# Patient Record
Sex: MALE | Race: WHITE | NOT HISPANIC OR LATINO | Employment: OTHER
[De-identification: names, ages, dates, MRNs, and addresses within clinical notes are randomized per-mention and may not be internally consistent; named-entity substitution may affect disease eponyms.]

---

## 2017-01-05 DIAGNOSIS — E78.2 MIXED HYPERLIPIDEMIA: Primary | ICD-10-CM

## 2017-01-05 RX ORDER — SIMVASTATIN 10 MG
TABLET ORAL
Qty: 30 TABLET | Refills: 11 | Status: SHIPPED | OUTPATIENT
Start: 2017-01-05 | End: 2018-02-27

## 2017-01-05 NOTE — TELEPHONE ENCOUNTER
Zocor     Last Written Prescription Date: 10/11/16  Last Fill Quantity: 30, # refills: 2  Last Office Visit with [facility], [facility] or [facility] prescribing provider: 12/22/16       CHOL      113   12/22/2016  HDL       61   12/22/2016  LDL       36   12/22/2016  TRIG       79   12/22/2016  CHOLHDLRATIO      1.7   11/12/2015

## 2017-01-26 DIAGNOSIS — E11.9 UNCOMPLICATED TYPE 2 DIABETES MELLITUS (H): Primary | ICD-10-CM

## 2017-02-09 DIAGNOSIS — L50.9 HIVES: Primary | ICD-10-CM

## 2017-02-10 RX ORDER — CETIRIZINE HYDROCHLORIDE 10 MG/1
TABLET, FILM COATED ORAL
Qty: 30 TABLET | Refills: 9 | Status: SHIPPED | OUTPATIENT
Start: 2017-02-10 | End: 2017-12-05

## 2017-02-23 DIAGNOSIS — E11.9 DIABETES MELLITUS, TYPE 2 (H): ICD-10-CM

## 2017-02-23 RX ORDER — LANCETS 32 GAUGE
EACH MISCELLANEOUS
Qty: 100 EACH | Refills: 2 | Status: SHIPPED | OUTPATIENT
Start: 2017-02-23 | End: 2019-03-04

## 2017-03-09 DIAGNOSIS — L23.7 CONTACT DERMATITIS DUE TO POISON IVY: ICD-10-CM

## 2017-03-10 RX ORDER — TRIAMCINOLONE ACETONIDE 1 MG/G
CREAM TOPICAL
Qty: 80 G | Refills: 1 | Status: SHIPPED | OUTPATIENT
Start: 2017-03-10 | End: 2017-04-20

## 2017-04-20 DIAGNOSIS — L23.7 CONTACT DERMATITIS DUE TO POISON IVY: ICD-10-CM

## 2017-04-20 NOTE — TELEPHONE ENCOUNTER
jaylin      Last Written Prescription Date: 3/10/17  Last Fill Quantity: 80g,  # refills: 1   Last Office Visit with G, P or [facility] prescribing provider: 12/22/16

## 2017-04-21 RX ORDER — TRIAMCINOLONE ACETONIDE 1 MG/G
CREAM TOPICAL
Qty: 80 G | Refills: 1 | Status: SHIPPED | OUTPATIENT
Start: 2017-04-21 | End: 2017-06-08

## 2017-06-08 DIAGNOSIS — L23.7 CONTACT DERMATITIS DUE TO POISON IVY: ICD-10-CM

## 2017-06-09 NOTE — TELEPHONE ENCOUNTER
Triamcinolone 0.1 cream      Last Written Prescription Date: 6/08/17  Last Fill Quantity: 80gram,  # refills: 1   Last Office Visit with G, [facility] or [facility] prescribing provider: 12/22/16

## 2017-06-12 RX ORDER — TRIAMCINOLONE ACETONIDE 1 MG/G
CREAM TOPICAL
Qty: 80 G | Refills: 1 | Status: SHIPPED | OUTPATIENT
Start: 2017-06-12 | End: 2017-07-27

## 2017-06-15 DIAGNOSIS — I10 HTN (HYPERTENSION): ICD-10-CM

## 2017-06-19 RX ORDER — RAMIPRIL 5 MG/1
CAPSULE ORAL
Qty: 30 CAPSULE | Refills: 5 | Status: SHIPPED | OUTPATIENT
Start: 2017-06-19 | End: 2017-12-07

## 2017-06-29 DIAGNOSIS — E11.9 UNCOMPLICATED TYPE 2 DIABETES MELLITUS (H): ICD-10-CM

## 2017-06-30 NOTE — TELEPHONE ENCOUNTER
Metformin         Last Written Prescription Date: 1/30/17  Last Fill Quantity: 120, # refills: 4  Last Office Visit with [facility], [facility] or [facility] prescribing provider:  12/22/16        BP Readings from Last 3 Encounters:   12/22/16 94/64   09/13/16 110/64   03/10/16 112/64     Lab Results   Component Value Date    MICROL 125 12/22/2016     Lab Results   Component Value Date    UMALCR 143.18 12/22/2016     Creatinine   Date Value Ref Range Status   12/22/2016 0.82 0.66 - 1.25 mg/dL Final   ]  GFR Estimate   Date Value Ref Range Status   12/22/2016 >90  Non  GFR Calc   >60 mL/min/1.7m2 Final   09/13/2016 >90  Non  GFR Calc   >60 mL/min/1.7m2 Final   11/12/2015 >90  Non  GFR Calc   >60 mL/min/1.7m2 Final     GFR Estimate If Black   Date Value Ref Range Status   12/22/2016 >90   GFR Calc   >60 mL/min/1.7m2 Final   09/13/2016 >90   GFR Calc   >60 mL/min/1.7m2 Final   11/12/2015 >90   GFR Calc   >60 mL/min/1.7m2 Final     Lab Results   Component Value Date    CHOL 113 12/22/2016     Lab Results   Component Value Date    HDL 61 12/22/2016     Lab Results   Component Value Date    LDL 36 12/22/2016     Lab Results   Component Value Date    TRIG 79 12/22/2016     Lab Results   Component Value Date    CHOLHDLRATIO 1.7 11/12/2015     Lab Results   Component Value Date    AST 17 09/24/2013     Lab Results   Component Value Date    ALT 33 12/22/2016     Lab Results   Component Value Date    A1C 6.6 12/22/2016    A1C 6.2 09/13/2016    A1C 6.1 11/12/2015    A1C 6.2 03/26/2015    A1C 6.3 09/26/2014     Potassium   Date Value Ref Range Status   12/22/2016 4.2 3.4 - 5.3 mmol/L Final

## 2017-07-06 DIAGNOSIS — E11.9 DIABETES MELLITUS, TYPE 2 (H): ICD-10-CM

## 2017-07-07 RX ORDER — ALPRAZOLAM 1 MG/1
TABLET ORAL
Qty: 50 STRIP | Refills: 7 | Status: SHIPPED | OUTPATIENT
Start: 2017-07-07 | End: 2018-01-25

## 2017-07-27 DIAGNOSIS — L23.7 CONTACT DERMATITIS DUE TO POISON IVY: ICD-10-CM

## 2017-07-28 NOTE — TELEPHONE ENCOUNTER
Kenalog cream      Last Written Prescription Date: 6/12/17  Last Fill Quantity: 80 g,  # refills: 1   Last Office Visit with [facility], [facility] or [facility] prescribing provider: 12/22/16

## 2017-07-31 RX ORDER — TRIAMCINOLONE ACETONIDE 1 MG/G
CREAM TOPICAL
Qty: 80 G | Refills: 1 | Status: SHIPPED | OUTPATIENT
Start: 2017-07-31 | End: 2017-09-14

## 2017-08-24 DIAGNOSIS — E11.9 TYPE 2 DIABETES MELLITUS WITHOUT COMPLICATION, WITHOUT LONG-TERM CURRENT USE OF INSULIN (H): Primary | ICD-10-CM

## 2017-08-24 DIAGNOSIS — E11.9 UNCOMPLICATED TYPE 2 DIABETES MELLITUS (H): ICD-10-CM

## 2017-08-24 NOTE — TELEPHONE ENCOUNTER
Metformin  Last visit: 12.22.16 - Overdue for diabetic follow up. No visit scheduled. Please advise. Thank you

## 2017-09-14 DIAGNOSIS — L23.7 CONTACT DERMATITIS DUE TO POISON IVY: ICD-10-CM

## 2017-09-14 RX ORDER — TRIAMCINOLONE ACETONIDE 1 MG/G
CREAM TOPICAL
Qty: 80 G | Refills: 1 | Status: SHIPPED | OUTPATIENT
Start: 2017-09-14 | End: 2017-10-30

## 2017-10-30 DIAGNOSIS — L23.7 CONTACT DERMATITIS DUE TO POISON IVY: ICD-10-CM

## 2017-10-31 NOTE — TELEPHONE ENCOUNTER
Kenalog      Last Written Prescription Date: 9/14/17  Last Fill Quantity: 80g,  # refills: 1   Last Office Visit with G, P or [facility] prescribing provider: 12/22/16

## 2017-11-02 RX ORDER — TRIAMCINOLONE ACETONIDE 1 MG/G
CREAM TOPICAL
Qty: 80 G | Refills: 1 | Status: SHIPPED | OUTPATIENT
Start: 2017-11-02 | End: 2017-12-11

## 2017-11-20 DIAGNOSIS — E11.9 TYPE 2 DIABETES MELLITUS WITHOUT COMPLICATION, WITHOUT LONG-TERM CURRENT USE OF INSULIN (H): ICD-10-CM

## 2017-11-24 DIAGNOSIS — Z00.00 HEALTHCARE MAINTENANCE: ICD-10-CM

## 2017-11-24 NOTE — TELEPHONE ENCOUNTER
Peridex      Last Written Prescription Date: 10/31/16  Last Fill Quantity: 473ml,  # refills: PRN   Last Office Visit with [facility], [facility] or [facility] prescribing provider: 12/22/16                                         Next 5 appointments (look out 90 days)     Dec 26, 2017  9:00 AM CST   (Arrive by 8:45 AM)   PHYSICAL with Nicole Rojas MD   [facility] ([facility] )    [address]  [place] [address]   [phone]

## 2017-11-28 RX ORDER — CHLORHEXIDINE GLUCONATE ORAL RINSE 1.2 MG/ML
SOLUTION DENTAL
Qty: 473 ML | Status: SHIPPED | OUTPATIENT
Start: 2017-11-28 | End: 2018-01-25

## 2017-12-02 ENCOUNTER — HEALTH MAINTENANCE LETTER (OUTPATIENT)
Age: 62
End: 2017-12-02

## 2017-12-05 DIAGNOSIS — L50.9 HIVES: ICD-10-CM

## 2017-12-06 NOTE — TELEPHONE ENCOUNTER
Zantac       Last Written Prescription Date: 2/10/2017  Last Fill Quantity: 30,  # refills: 9   Last Office Visit with [facility], [facility] or [facility] prescribing provider: 12/22/2016                     Allergy       Last Written Prescription Date: 2/10/2017  Last Fill Quantity: 30,  # refills: 9   Last Office Visit with [facility], [facility] or [facility] prescribing provider: 12/22/2016             Next 5 appointments (look out 90 days)     Dec 26, 2017  9:00 AM CST   (Arrive by 8:45 AM)   PHYSICAL with Nicole Rojas MD   [facility] [place] ([facility] )    [address]  [facility] [address]   [phone]

## 2017-12-07 DIAGNOSIS — I10 HTN (HYPERTENSION): ICD-10-CM

## 2017-12-07 RX ORDER — CETIRIZINE HYDROCHLORIDE 10 MG/1
TABLET, FILM COATED ORAL
Qty: 30 TABLET | Refills: 0 | Status: SHIPPED | OUTPATIENT
Start: 2017-12-07 | End: 2018-01-25

## 2017-12-07 NOTE — TELEPHONE ENCOUNTER
Ramipril      Last Written Prescription Date: 6/19/17  Last Fill Quantity: 30,  # refills: 5   Last Office Visit with [facility], [facility] or [facility] prescribing provider: 12/22/16                                         Next 5 appointments (look out 90 days)     Dec 26, 2017  9:00 AM CST   (Arrive by 8:45 AM)   PHYSICAL with Nicole Rojas MD   [facility] ([facility] )    [address]  [place] [address]   [phone]

## 2017-12-08 RX ORDER — RAMIPRIL 5 MG/1
CAPSULE ORAL
Qty: 30 CAPSULE | Refills: 0 | Status: SHIPPED | OUTPATIENT
Start: 2017-12-08 | End: 2018-01-25

## 2017-12-11 DIAGNOSIS — L23.7 CONTACT DERMATITIS DUE TO POISON IVY: ICD-10-CM

## 2017-12-13 NOTE — TELEPHONE ENCOUNTER
Kenalog Cream      Last Written Prescription Date: 11/02/2017  Last Fill Quantity: 80g,  # refills: 1   Last Office Visit with [facility], [facility] or [facility] prescribing provider: 12/22/2016                                         Next 5 appointments (look out 90 days)     Dec 26, 2017  9:00 AM CST   (Arrive by 8:45 AM)   PHYSICAL with Nicole Rojas MD   [facility] ([facility] )    [address]  [facility] [address]   [phone]

## 2017-12-14 RX ORDER — TRIAMCINOLONE ACETONIDE 1 MG/G
CREAM TOPICAL
Qty: 80 G | Refills: 0 | Status: SHIPPED | OUTPATIENT
Start: 2017-12-14 | End: 2018-01-03

## 2017-12-26 DIAGNOSIS — Z00.00 HEALTH CARE MAINTENANCE: ICD-10-CM

## 2017-12-26 NOTE — LETTER
December 27, 2017      Aki DUVALL Sopp  [address] [facility]  [place] [address]        Dear Aki,     APPOINTMENT REMINDER:   Our records indicates that it is time for you to be seen for a recheck.     Your current medication request will be approved for one refill but you will need to be seen before any additional refills can be approved.  Taking care of your health is important to us, and ongoing visits with your provider are vital to your care.    We look forward to seeing you in the near future.  You may call our office at [phone] to schedule a visit.     Please disregard this notice if you have already made an appointment.        Sincerely,        Nicole Rojas MD

## 2017-12-27 RX ORDER — MULTIVITAMIN WITH FOLIC ACID 400 MCG
TABLET ORAL
Qty: 30 TABLET | Refills: 0 | Status: SHIPPED | OUTPATIENT
Start: 2017-12-27 | End: 2018-05-24

## 2017-12-27 NOTE — TELEPHONE ENCOUNTER
Multiple Vitamin ( Tab-a-deepti)      Last Written Prescription Date: 12/28/16  Last Fill Quantity: 30,  # refills: 11   Last Office Visit with [facility], [facility] or [facility] prescribing provider: 12/26/17

## 2018-01-03 DIAGNOSIS — L23.7 CONTACT DERMATITIS DUE TO POISON IVY: ICD-10-CM

## 2018-01-03 NOTE — TELEPHONE ENCOUNTER
triamcinolone (KENALOG) 0.1 % cream   Last Written Prescription Date: 12/14/17  Last Fill Quantity: 80g,  # refills: 0   Last Office Visit with [facility], [facility] or [facility] prescribing provider: 12/26/17                                         Next 5 appointments (look out 90 days)     Feb 08, 2018  1:30 PM CST   (Arrive by 1:15 PM)   PHYSICAL with Nicole Rojas MD   [facility] ([facility] )    [address]  [facility] [address]   [phone]

## 2018-01-05 RX ORDER — TRIAMCINOLONE ACETONIDE 1 MG/G
CREAM TOPICAL
Qty: 80 G | Refills: 0 | Status: SHIPPED | OUTPATIENT
Start: 2018-01-05 | End: 2018-01-30

## 2018-01-05 NOTE — TELEPHONE ENCOUNTER
Please advise.  Kenalog cream was prescribed for dermatitis due to poison ivy.  Patient due for office visit.  PCP [facility].  Please advise.  Thank you.

## 2018-01-25 DIAGNOSIS — E11.9 TYPE 2 DIABETES MELLITUS WITHOUT COMPLICATION, WITHOUT LONG-TERM CURRENT USE OF INSULIN (H): Primary | ICD-10-CM

## 2018-01-25 DIAGNOSIS — L23.7 CONTACT DERMATITIS DUE TO POISON IVY: ICD-10-CM

## 2018-01-25 DIAGNOSIS — Z00.00 HEALTHCARE MAINTENANCE: ICD-10-CM

## 2018-01-25 DIAGNOSIS — E11.9 DIABETES MELLITUS, TYPE 2 (H): ICD-10-CM

## 2018-01-25 DIAGNOSIS — I10 HTN (HYPERTENSION): ICD-10-CM

## 2018-01-25 DIAGNOSIS — L50.9 HIVES: ICD-10-CM

## 2018-01-25 RX ORDER — CETIRIZINE HYDROCHLORIDE 10 MG/1
TABLET, FILM COATED ORAL
Qty: 30 TABLET | Refills: 3 | Status: SHIPPED | OUTPATIENT
Start: 2018-01-25 | End: 2018-04-24

## 2018-01-25 RX ORDER — TRIAMCINOLONE ACETONIDE 1 MG/G
CREAM TOPICAL
Qty: 80 G | Refills: 0 | OUTPATIENT
Start: 2018-01-25

## 2018-01-25 RX ORDER — CHLORHEXIDINE GLUCONATE ORAL RINSE 1.2 MG/ML
SOLUTION DENTAL
Qty: 473 ML | Status: SHIPPED | OUTPATIENT
Start: 2018-01-25 | End: 2018-09-18

## 2018-01-25 RX ORDER — RAMIPRIL 5 MG/1
CAPSULE ORAL
Qty: 30 CAPSULE | Refills: 0 | Status: SHIPPED | OUTPATIENT
Start: 2018-01-25 | End: 2018-03-06

## 2018-01-25 NOTE — TELEPHONE ENCOUNTER
Peridex  Last office visit: 12/22/16  Last refill: 11/28/17 #473 ml with PRN refill  Patient due for office visit. Appointment scheduled for 02/08/18  Thank you.

## 2018-02-08 ENCOUNTER — OFFICE VISIT (OUTPATIENT)
Dept: FAMILY MEDICINE | Facility: OTHER | Age: 63
End: 2018-02-08
Attending: FAMILY MEDICINE
Payer: MEDICARE

## 2018-02-08 VITALS
OXYGEN SATURATION: 97 % | RESPIRATION RATE: 16 BRPM | BODY MASS INDEX: 25.95 KG/M2 | WEIGHT: 152 LBS | SYSTOLIC BLOOD PRESSURE: 110 MMHG | TEMPERATURE: 96.2 F | HEART RATE: 86 BPM | DIASTOLIC BLOOD PRESSURE: 60 MMHG | HEIGHT: 64 IN

## 2018-02-08 DIAGNOSIS — Z12.5 SCREENING FOR PROSTATE CANCER: ICD-10-CM

## 2018-02-08 DIAGNOSIS — Z12.11 SPECIAL SCREENING FOR MALIGNANT NEOPLASMS, COLON: ICD-10-CM

## 2018-02-08 DIAGNOSIS — E78.5 HYPERLIPIDEMIA, UNSPECIFIED HYPERLIPIDEMIA TYPE: ICD-10-CM

## 2018-02-08 DIAGNOSIS — E11.9 TYPE 2 DIABETES MELLITUS WITHOUT COMPLICATION, WITHOUT LONG-TERM CURRENT USE OF INSULIN (H): ICD-10-CM

## 2018-02-08 DIAGNOSIS — I10 ESSENTIAL HYPERTENSION, BENIGN: ICD-10-CM

## 2018-02-08 DIAGNOSIS — Z00.00 ROUTINE GENERAL MEDICAL EXAMINATION AT A HEALTH CARE FACILITY: Primary | ICD-10-CM

## 2018-02-08 LAB
ALT SERPL W P-5'-P-CCNC: 32 U/L (ref 0–70)
ANION GAP SERPL CALCULATED.3IONS-SCNC: 9 MMOL/L (ref 3–14)
BUN SERPL-MCNC: 18 MG/DL (ref 7–30)
CALCIUM SERPL-MCNC: 8.6 MG/DL (ref 8.5–10.1)
CHLORIDE SERPL-SCNC: 102 MMOL/L (ref 94–109)
CHOLEST SERPL-MCNC: 111 MG/DL
CO2 SERPL-SCNC: 27 MMOL/L (ref 20–32)
CREAT SERPL-MCNC: 0.92 MG/DL (ref 0.66–1.25)
CREAT UR-MCNC: 24 MG/DL
EST. AVERAGE GLUCOSE BLD GHB EST-MCNC: 151 MG/DL
GFR SERPL CREATININE-BSD FRML MDRD: 84 ML/MIN/1.7M2
GLUCOSE SERPL-MCNC: 211 MG/DL (ref 70–99)
HBA1C MFR BLD: 6.9 % (ref 4.3–6)
HDLC SERPL-MCNC: 45 MG/DL
LDLC SERPL CALC-MCNC: 13 MG/DL
MICROALBUMIN UR-MCNC: 22 MG/L
MICROALBUMIN/CREAT UR: 90.04 MG/G CR (ref 0–17)
NONHDLC SERPL-MCNC: 66 MG/DL
POTASSIUM SERPL-SCNC: 4.3 MMOL/L (ref 3.4–5.3)
PSA SERPL-ACNC: 1.2 UG/L (ref 0–4)
SODIUM SERPL-SCNC: 138 MMOL/L (ref 133–144)
TRIGL SERPL-MCNC: 263 MG/DL
TSH SERPL DL<=0.005 MIU/L-ACNC: 3.17 MU/L (ref 0.4–4)

## 2018-02-08 PROCEDURE — 40000788 ZZHCL STATISTIC ESTIMATED AVERAGE GLUCOSE: Mod: ZL | Performed by: FAMILY MEDICINE

## 2018-02-08 PROCEDURE — 83036 HEMOGLOBIN GLYCOSYLATED A1C: CPT | Mod: ZL | Performed by: FAMILY MEDICINE

## 2018-02-08 PROCEDURE — 80048 BASIC METABOLIC PNL TOTAL CA: CPT | Mod: ZL | Performed by: FAMILY MEDICINE

## 2018-02-08 PROCEDURE — G0103 PSA SCREENING: HCPCS | Mod: ZL | Performed by: FAMILY MEDICINE

## 2018-02-08 PROCEDURE — 99396 PREV VISIT EST AGE 40-64: CPT | Performed by: FAMILY MEDICINE

## 2018-02-08 PROCEDURE — 36415 COLL VENOUS BLD VENIPUNCTURE: CPT | Mod: ZL | Performed by: FAMILY MEDICINE

## 2018-02-08 PROCEDURE — 82043 UR ALBUMIN QUANTITATIVE: CPT | Mod: ZL | Performed by: FAMILY MEDICINE

## 2018-02-08 PROCEDURE — 84443 ASSAY THYROID STIM HORMONE: CPT | Mod: ZL | Performed by: FAMILY MEDICINE

## 2018-02-08 PROCEDURE — 84460 ALANINE AMINO (ALT) (SGPT): CPT | Mod: ZL | Performed by: FAMILY MEDICINE

## 2018-02-08 PROCEDURE — 80061 LIPID PANEL: CPT | Mod: ZL | Performed by: FAMILY MEDICINE

## 2018-02-08 NOTE — PROGRESS NOTES
SUBJECTIVE:   CC: Aki Navarro is an 62 year old male who presents for preventative health visit.     Healthy Habits:    Do you get at least three servings of calcium containing foods daily (dairy, green leafy vegetables, etc.)? yes    Amount of exercise or daily activities, outside of work: Active    Problems taking medications regularly: No staff administers    Medication side effects: No    Have you had an eye exam in the past two years? yes    Do you see a dentist twice per year? yes    Do you have sleep apnea, excessive snoring or daytime drowsiness?no       Diabetes Follow-up    Patient is checking blood sugars: once daily.  Results are as follows:         am - 130-150    Diabetic concerns: Prescription for diabetic shoes     Symptoms of hypoglycemia (low blood sugar): none     Paresthesias (numbness or burning in feet) or sores: No     Date of last diabetic eye exam: Spring 2017      Hyperlipidemia Follow-Up      Rate your low fat/cholesterol diet?: not monitoring fat    Taking statin?  Yes, no muscle aches from statin    Other lipid medications/supplements?:  none      Hypertension Follow-up      Outpatient blood pressures are not being checked    Low Salt Diet: not monitoring salt    BP Readings from Last 2 Encounters:   02/08/18 110/60   12/22/16 94/64     Hemoglobin A1C (%)   Date Value   12/22/2016 6.6 (H)   09/13/2016 6.2 (H)     LDL Cholesterol Calculated (mg/dL)   Date Value   12/22/2016 36   09/13/2016 33       Patient is due for colon cancer screening, they are interested in Cologuard.  He is also due for PSA.      Today's PHQ-2 Score: No flowsheet data found.      Social History   Substance Use Topics     Smoking status: Never Smoker     Smokeless tobacco: Never Used     Alcohol use Yes      Comment: 1 beer occasionally      If you drink alcohol do you typically have >3 drinks per day or >7 drinks per week? No                      Last PSA:   PSA   Date Value Ref Range Status   03/26/2015 0.82  0 - 4 ug/L Final       Reviewed orders with patient. Reviewed health maintenance and updated orders accordingly - Yes  Patient Active Problem List   Diagnosis     Advanced care planning/counseling discussion     Diabetes mellitus, type 2 (H)     Essential hypertension, benign     Hyperlipidemia     Mental retardation     Arthritis     Past Surgical History:   Procedure Laterality Date     MOUTH SURGERY  2008     undescended testicle surgery         Social History   Substance Use Topics     Smoking status: Never Smoker     Smokeless tobacco: Never Used     Alcohol use Yes      Comment: 1 beer occasionally     Family History   Problem Relation Age of Onset     CANCER Mother      brain cancer - cause of death     CANCER Father 76     renal cancer - cause of death     DIABETES Mother 60     DIABETES Sister 50         Current Outpatient Prescriptions   Medication Sig Dispense Refill     order for DME Equipment being ordered: Diabetic shoes dx: DM type 2, controlled, last visit 7/24/15, poor circulation as comorbidity 1 Device 0     triamcinolone (KENALOG) 0.1 % cream Apply to affected area(s) TOPICALLY twice daily 80 g 0     metFORMIN (GLUCOPHAGE) 1000 MG tablet Take one (1) tablet BY MOUTH twice daily 60 tablet 0     chlorhexidine (PERIDEX) 0.12 % solution RINSE WITH 15 MLS ORALLY TWICE DAILY 473 mL prn     ramipril (ALTACE) 5 MG capsule Take one (1) capsule BY MOUTH daily 30 capsule 0     ranitidine (ZANTAC) 300 MG tablet Take one (1) tablet BY MOUTH daily 30 tablet 0     ALL DAY ALLERGY 10 MG tablet Take one (1) tablet BY MOUTH daily 30 tablet 3     blood glucose monitoring (EASY TOUCH TEST) test strip TEST 1 TIMES BY FINGER POKE ROUTE EVERY DAY [facility] -UZ-.00-E11.9 50 strip 5     Multiple Vitamin (TAB-A-TIGRE) TABS TAKE 1 TABLET DAILY BY MOUTH TAKE WITH FOOD 30 tablet 0     EASY TOUCH LANCETS 32G/TWIST MISC TEST 1 TIMES BY FINGER POKE ROUTE EVERY DAY STS -MI E11.9 100 each 2     simvastatin (ZOCOR) 10 MG  "tablet TAKE 1 TABLET DAILY BY MOUTH 30 tablet 11     ACETAMINOPHEN PO Take 325 mg by mouth as needed for pain (4-6 hours)        ORDER FOR DME Equipment being ordered: Wheeled walker with seat    Patient has frequent falls, and need to rest often (needs seat) 1 Device 0     EASY TOUCH LANCETS 32G/TWIST MISC USE DAILY OR AS DIRECTED - each 5     Allergies   Allergen Reactions     Tape [Adhesive Tape]        Reviewed and updated as needed this visit by clinical staff  Tobacco  Allergies  Meds         Reviewed and updated as needed this visit by Provider            ROS:  C: NEGATIVE for fever, chills, change in weight  I: NEGATIVE for worrisome rashes, moles or lesions  E: NEGATIVE for vision changes or irritation  ENT: NEGATIVE for ear, mouth and throat problems  R: NEGATIVE for significant cough or SOB  CV: NEGATIVE for chest pain, palpitations or peripheral edema  GI: NEGATIVE for nausea, abdominal pain, heartburn, or change in bowel habits   male: negative for dysuria, hematuria, decreased urinary stream, erectile dysfunction, urethral discharge  M: NEGATIVE for significant arthralgias or myalgia  N: NEGATIVE for weakness, dizziness or paresthesias  P: NEGATIVE for changes in mood or affect    OBJECTIVE:   /60  Pulse 86  Temp 96.2  F (35.7  C) (Tympanic)  Resp 16  Ht 5' 4\" (1.626 m)  Wt 152 lb (68.9 kg)  SpO2 97%  BMI 26.09 kg/m2  EXAM:  GENERAL: healthy, alert and no distress  EYES: Eyes grossly normal to inspection, PERRL and conjunctivae and sclerae normal  HENT: nose and mouth without ulcers or lesions, oropharynx clear and oral mucous membranes moist  NECK: no adenopathy  RESP: lungs clear to auscultation - no rales, rhonchi or wheezes  CV: regular rate and rhythm, normal S1 S2, no S3 or S4, no murmur, click or rub, no peripheral edema and peripheral pulses strong  ABDOMEN: soft, nontender, no hepatosplenomegaly, no masses and bowel sounds normal  MS: no edema  PSYCH: affect " "normal/bright    ASSESSMENT/PLAN:       ICD-10-CM    1. Routine general medical examination at a health care facility Z00.00 Albumin Random Urine Quantitative with Creat Ratio     Estimated Average Glucose   2. Type 2 diabetes mellitus without complication, without long-term current use of insulin (H) E11.9 Hemoglobin A1c     TSH     order for DME   3. Hyperlipidemia, unspecified hyperlipidemia type E78.5 ALT     Lipid Profile   4. Essential hypertension, benign I10 Basic metabolic panel   5. Special screening for malignant neoplasms, colon Z12.11    6. Screening for prostate cancer Z12.5 PSA, screen       COUNSELING:  Reviewed preventive health counseling, as reflected in patient instructions       reports that he has never smoked. He has never used smokeless tobacco.    Estimated body mass index is 26.09 kg/(m^2) as calculated from the following:    Height as of this encounter: 5' 4\" (1.626 m).    Weight as of this encounter: 152 lb (68.9 kg).       Counseling Resources:  ATP IV Guidelines  Pooled Cohorts Equation Calculator  FRAX Risk Assessment  ICSI Preventive Guidelines  Dietary Guidelines for Americans, 2010  USDA's MyPlate  ASA Prophylaxis  Lung CA Screening      Nicole Rojas MD  [facility] [place] [facility]  "

## 2018-02-08 NOTE — MR AVS SNAPSHOT
After Visit Summary   2/8/2018    Aki Navarro    MRN: [id number]           Patient Information     Date Of Birth          1955        Visit Information        Provider Department      2/8/2018 1:30 PM Nicole Rjoas MD [facility]        Today's Diagnoses     Routine general medical examination at a health care facility    -  1    Type 2 diabetes mellitus without complication, without long-term current use of insulin (H)        Hyperlipidemia, unspecified hyperlipidemia type        Essential hypertension, benign        Special screening for malignant neoplasms, colon        Screening for prostate cancer          Care Instructions      Preventive Health Recommendations  Male Ages 50 - 64    Yearly exam:             See your health care provider every year in order to  o   Review health changes.   o   Discuss preventive care.    o   Review your medicines if your doctor has prescribed any.     Have a cholesterol test every 5 years, or more frequently if you are at risk for high cholesterol/heart disease.     Have a diabetes test (fasting glucose) every three years. If you are at risk for diabetes, you should have this test more often.     Have a colonoscopy at age 50, or have a yearly FIT test (stool test). These exams will check for colon cancer.      Talk with your health care provider about whether or not a prostate cancer screening test (PSA) is right for you.    You should be tested each year for STDs (sexually transmitted diseases), if you re at risk.     Shots: Get a flu shot each year. Get a tetanus shot every 10 years.     Nutrition:    Eat at least 5 servings of fruits and vegetables daily.     Eat whole-grain bread, whole-wheat pasta and brown rice instead of white grains and rice.     Talk to your provider about Calcium and Vitamin D.     Lifestyle    Exercise for at least 150 minutes a week (30 minutes a day, 5 days a week). This will help you control your weight  "and prevent disease.     Limit alcohol to one drink per day.     No smoking.     Wear sunscreen to prevent skin cancer.     See your dentist every six months for an exam and cleaning.     See your eye doctor every 1 to 2 years.            Follow-ups after your visit        Follow-up notes from your care team     Return in about 6 months (around 2018).      Who to contact     If you have questions or need follow up information about today's clinic visit or your schedule please contact [facility] directly at [phone].  Normal or non-critical lab and imaging results will be communicated to you by CRAZEhart, letter or phone within 4 business days after the clinic has received the results. If you do not hear from us within 7 days, please contact the clinic through [employer] or phone. If you have a critical or abnormal lab result, we will notify you by phone as soon as possible.  Submit refill requests through [employer] or call your pharmacy and they will forward the refill request to us. Please allow 3 business days for your refill to be completed.          Additional Information About Your Visit        [employer] Information     [employer] lets you send messages to your doctor, view your test results, renew your prescriptions, schedule appointments and more. To sign up, go to [email or web address] . Click on \"Log in\" on the left side of the screen, which will take you to the Welcome page. Then click on \"Sign up Now\" on the right side of the page.     You will be asked to enter the access code listed below, as well as some personal information. Please follow the directions to create your username and password.     Your access code is: [email or web address]  Expires: 3/26/2018  9:24 AM     Your access code will  in 90 days. If you need help or a new code, please call your [facility] or [phone].        Care EveryWhere ID     This is your Care EveryWhere ID. This could be used by other organizations to " "access your [facility] medical records  [id number]        Your Vitals Were     Pulse Temperature Respirations Height Pulse Oximetry BMI (Body Mass Index)    86 96.2  F (35.7  C) (Tympanic) 16 5' 4\" (1.626 m) 97% 26.09 kg/m2       Blood Pressure from Last 3 Encounters:   02/08/18 110/60   12/22/16 94/64   09/13/16 110/64    Weight from Last 3 Encounters:   02/08/18 152 lb (68.9 kg)   12/22/16 138 lb (62.6 kg)   09/13/16 143 lb (64.9 kg)              We Performed the Following     Albumin Random Urine Quantitative with Creat Ratio     ALT     Basic metabolic panel     Estimated Average Glucose     Hemoglobin A1c     Lipid Profile     PSA, screen     TSH          Today's Medication Changes          These changes are accurate as of 2/8/18 11:59 PM.  If you have any questions, ask your nurse or doctor.               These medicines have changed or have updated prescriptions.        Dose/Directions    * order for DME   This may have changed:  Another medication with the same name was changed. Make sure you understand how and when to take each.   Used for:  Unspecified intellectual disabilities, Arthritis   Changed by:  Nicole Rojas MD        Equipment being ordered: Wheeled walker with seat  Patient has frequent falls, and need to rest often (needs seat)   Quantity:  1 Device   Refills:  0       * order for DME   This may have changed:  additional instructions   Used for:  Type 2 diabetes mellitus without complication, without long-term current use of insulin (H)   Changed by:  Nicole Rojas MD        Equipment being ordered: Diabetic shoes dx: DM type 2, controlled, last visit 7/24/15, poor circulation as comorbidity   Quantity:  1 Device   Refills:  0       * Notice:  This list has 2 medication(s) that are the same as other medications prescribed for you. Read the directions carefully, and ask your doctor or other care provider to review them with you.         Where to get your medicines      Some of " these will need a paper prescription and others can be bought over the counter.  Ask your nurse if you have questions.     Bring a paper prescription for each of these medications     order for DME                Primary Care Provider Office Phone # Fax #    Nicole Rojas [phone] [address] [facility] [address]        Equal Access to Services     XOCHITL DALE : Hadii aquilino ku hadasho Soomaali, waaxda luqadaha, qaybta kaalmada adeegyada, rosaura blas . So [facility] [phone].    ATENCIÓN: Si habla español, tiene a rodrigues disposición servicios gratuitos de asistencia lingüística. Meryl al [phone].    We comply with applicable federal civil rights laws and [place] laws. We do not discriminate on the basis of race, color, national origin, age, disability, sex, sexual orientation, or gender identity.            Thank you!     Thank you for choosing [facility]  for your care. Our goal is always to provide you with excellent care. Hearing back from our patients is one way we can continue to improve our services. Please take a few minutes to complete the written survey that you may receive in the mail after your visit with us. Thank you!             Your Updated Medication List - Protect others around you: Learn how to safely use, store and throw away your medicines at www.disposemymeds.org.          This list is accurate as of 2/8/18 11:59 PM.  Always use your most recent med list.                   Brand Name Dispense Instructions for use Diagnosis    ACETAMINOPHEN PO      Take 325 mg by mouth as needed for pain (4-6 hours)        ALL DAY ALLERGY 10 MG tablet   Generic drug:  cetirizine     30 tablet    Take one (1) tablet BY MOUTH daily    Hives       blood glucose monitoring test strip    EASY TOUCH TEST    50 strip    TEST 1 TIMES BY FINGER POKE ROUTE EVERY DAY [facility] -PD-.00-E11.9    Diabetes mellitus, type 2 (H)        chlorhexidine 0.12 % solution    PERIDEX    473 mL    RINSE WITH 15 MLS ORALLY TWICE DAILY    Healthcare maintenance       * EASY TOUCH LANCETS 32G/TWIST Misc     100 each    USE DAILY OR AS DIRECTED -TP    Diabetes mellitus, type 2 (H)       * EASY TOUCH LANCETS 32G/TWIST Misc     100 each    TEST 1 TIMES BY FINGER POKE ROUTE EVERY DAY STS -DB E11.9    Diabetes mellitus, type 2 (H)       metFORMIN 1000 MG tablet    GLUCOPHAGE    60 tablet    Take one (1) tablet BY MOUTH twice daily    Type 2 diabetes mellitus without complication, without long-term current use of insulin (H)       * order for DME     1 Device    Equipment being ordered: Wheeled walker with seat  Patient has frequent falls, and need to rest often (needs seat)    Unspecified intellectual disabilities, Arthritis       * order for DME     1 Device    Equipment being ordered: Diabetic shoes dx: DM type 2, controlled, last visit 7/24/15, poor circulation as comorbidity    Type 2 diabetes mellitus without complication, without long-term current use of insulin (H)       ramipril 5 MG capsule    ALTACE    30 capsule    Take one (1) capsule BY MOUTH daily    HTN (hypertension)       ranitidine 300 MG tablet    ZANTAC    30 tablet    Take one (1) tablet BY MOUTH daily    Hives       simvastatin 10 MG tablet    ZOCOR    30 tablet    TAKE 1 TABLET DAILY BY MOUTH    Mixed hyperlipidemia       TAB-A-TIGRE Tabs     30 tablet    TAKE 1 TABLET DAILY BY MOUTH TAKE WITH FOOD    Health care maintenance       triamcinolone 0.1 % cream    KENALOG    80 g    Apply to affected area(s) TOPICALLY twice daily    Contact dermatitis due to poison ivy       * Notice:  This list has 4 medication(s) that are the same as other medications prescribed for you. Read the directions carefully, and ask your doctor or other care provider to review them with you.

## 2018-02-08 NOTE — NURSING NOTE
"Chief Complaint   Patient presents with     Physical       Initial /60  Pulse 86  Temp 96.2  F (35.7  C) (Tympanic)  Resp 16  Ht 5' 4\" (1.626 m)  Wt 152 lb (68.9 kg)  SpO2 97%  BMI 26.09 kg/m2 Estimated body mass index is 26.09 kg/(m^2) as calculated from the following:    Height as of this encounter: 5' 4\" (1.626 m).    Weight as of this encounter: 152 lb (68.9 kg).  Medication Reconciliation: complete   Noelle Sanabria      "

## 2018-02-09 NOTE — PROGRESS NOTES
Faxed Progress Note 2/8/18 to [facility]:  . [phone] . [phone]

## 2018-02-27 DIAGNOSIS — E78.2 MIXED HYPERLIPIDEMIA: ICD-10-CM

## 2018-02-27 RX ORDER — SIMVASTATIN 10 MG
TABLET ORAL
Qty: 30 TABLET | Refills: 5 | Status: SHIPPED | OUTPATIENT
Start: 2018-02-27 | End: 2018-07-17

## 2018-02-28 ENCOUNTER — MEDICAL CORRESPONDENCE (OUTPATIENT)
Dept: HEALTH INFORMATION MANAGEMENT | Facility: CLINIC | Age: 63
End: 2018-02-28

## 2018-03-01 DIAGNOSIS — E11.9 DIABETES MELLITUS, TYPE 2 (H): ICD-10-CM

## 2018-03-01 RX ORDER — LANCETS 32 GAUGE
1 EACH MISCELLANEOUS DAILY
Qty: 100 EACH | Refills: 5 | Status: SHIPPED | OUTPATIENT
Start: 2018-03-01 | End: 2019-03-04

## 2018-03-01 NOTE — TELEPHONE ENCOUNTER
EASY TOUCH LANCETS 32G/TWIST MISC  Last Written Prescription Date:  2/23/17  Last Fill Quantity: 100,   # refills: 2  Last Office Visit: 2/8/18  Future Office visit:     Note: looking for quantity of 500

## 2018-03-04 ENCOUNTER — HOSPITAL ENCOUNTER (EMERGENCY)
Facility: HOSPITAL | Age: 63
Discharge: HOME | End: 2018-03-04
Attending: PHYSICIAN ASSISTANT | Admitting: PHYSICIAN ASSISTANT
Payer: MEDICARE

## 2018-03-04 VITALS
DIASTOLIC BLOOD PRESSURE: 84 MMHG | TEMPERATURE: 96.5 F | OXYGEN SATURATION: 100 % | SYSTOLIC BLOOD PRESSURE: 124 MMHG | RESPIRATION RATE: 18 BRPM

## 2018-03-04 DIAGNOSIS — L03.116 CELLULITIS OF LEFT LOWER EXTREMITY: ICD-10-CM

## 2018-03-04 DIAGNOSIS — L97.521 SKIN ULCER OF LEFT FOOT, LIMITED TO BREAKDOWN OF SKIN (H): ICD-10-CM

## 2018-03-04 PROCEDURE — G0463 HOSPITAL OUTPT CLINIC VISIT: HCPCS

## 2018-03-04 PROCEDURE — 99213 OFFICE O/P EST LOW 20 MIN: CPT | Performed by: PHYSICIAN ASSISTANT

## 2018-03-04 RX ORDER — MUPIROCIN CALCIUM 20 MG/G
CREAM TOPICAL 2 TIMES DAILY
Qty: 15 G | Refills: 0 | Status: SHIPPED | OUTPATIENT
Start: 2018-03-04 | End: 2018-03-11

## 2018-03-04 RX ORDER — SULFAMETHOXAZOLE/TRIMETHOPRIM 800-160 MG
1 TABLET ORAL 2 TIMES DAILY
Qty: 10 TABLET | Refills: 0 | Status: SHIPPED | OUTPATIENT
Start: 2018-03-04 | End: 2018-03-09

## 2018-03-04 ASSESSMENT — ENCOUNTER SYMPTOMS
PSYCHIATRIC NEGATIVE: 1
CARDIOVASCULAR NEGATIVE: 1
RESPIRATORY NEGATIVE: 1
COLOR CHANGE: 1
CONSTITUTIONAL NEGATIVE: 1
NEUROLOGICAL NEGATIVE: 1
WOUND: 1

## 2018-03-04 NOTE — ED AVS SNAPSHOT
[facility]    [address]    [place] [address]    Phone:  [phone]                                       Aki Navarro   MRN: [id number]    Department:  [facility]   Date of Visit:  3/4/2018           After Visit Summary Signature Page     I have received my discharge instructions, and my questions have been answered. I have discussed any challenges I see with this plan with the nurse or doctor.    ..........................................................................................................................................  Patient/Patient Representative Signature      ..........................................................................................................................................  Patient Representative Print Name and Relationship to Patient    ..................................................               ................................................  Date                                            Time    ..........................................................................................................................................  Reviewed by Signature/Title    ...................................................              ..............................................  Date                                                            Time

## 2018-03-04 NOTE — ED NOTES
Pt presents today with a care giver from the [facility] for c/o open sores/cellulitis to his left foot, pt is a diabetic.

## 2018-03-04 NOTE — DISCHARGE INSTRUCTIONS
- Wound check and dressing changes (gently cleanse, pat dry, apply topical antibiotics, then dressing/clean cotton sock) daily until follow up with wound clinic.   - Avoid shoes when practical.   - Take antibiotic as directed by mouth for 5 days - wound clinic can change this as needed  - Fevers or worsening despite treatment will need to be rechecked sooner than 3-5 days.

## 2018-03-04 NOTE — ED AVS SNAPSHOT
[facility]    [address] [address]    Phone:  [phone]                                       Aki Navarro   MRN: [id number]    Department:  [facility]   Date of Visit:  3/4/2018           Patient Information     Date Of Birth          1955        Your diagnoses for this visit were:     Skin ulcer of left foot, limited to breakdown of skin (H)     Cellulitis of left lower extremity        You were seen by Tone Bynum PA.      Follow-up Information     Follow up with [facility].    Specialty:  EMERGENCY MEDICINE    Why:  If symptoms worsen - wound clinic next week    Contact information:    [address] [address] [phone]    Additional information:    From [facility]: Take [address]. Turn left at [address] [facility]. Turn left at the first stoplight on [address]. At the first stop sign, take a right onto [address]. Take a left into the parking lot and continue through until you reach the North enterance of the building.       From [place]: Take [address]. Take the [address] ramp towards [place]. Turn left onto [address]. Take a slight right onto [address] [facility]. Turn left at the first stoplight on [address]. At the first stop sign, take a right onto [address]. Take a left into the parking lot and continue through until you reach the North enterance of the building.       From [place]: Take [address]. Take a right at [address]. At the first stop sign, take a right onto [address]. Take a left into the parking lot and continue through until you reach the North enterance of the building.         Discharge Instructions       - Wound check and dressing changes (gently cleanse, pat dry, apply topical antibiotics, then dressing/clean cotton sock) daily until follow up with wound clinic.   - Avoid shoes when practical.   - Take  antibiotic as directed by mouth for 5 days - wound clinic can change this as needed  - Fevers or worsening despite treatment will need to be rechecked sooner than 3-5 days.     Discharge References/Attachments     SKIN INFECTION, CELLULITIS (ENGLISH)      ED Discharge Orders     WOUND CARE REFERRAL ([place])       WOUND/OSTOMY CENTER ([place]) TO EVALUATE, INITIATE TREATMENT BASED ON WOUND OSTOMY PROTOCOL, AND RECOMMEND AND REVISE AS NEEDED AN INDIVIDUALIZED TREATMENT CARE PLAN:    Conservative Sharp Debridement of non-viable and loose necrotic tissue  Topical 5% Lidocaine (Xylocaine) ointment - for pain associated with wound care/debridement; apply thin layer   Dakin's Solution (Sodium Hypochlorite Topical 0.125%) to cleanse wounds with odor & signs of infection  Antifungal Powder and Cream (miconazole (Micatin) 2%) - applied topically to areas of fungal dermatitis  Cadexomer Iodine (Iodosorb) 0.9% Topical Gel apply topically to wound beds with slough/soft necrotic tissue with suspected bioburden  Collagenase Enzymatic Debrider (Santyl ointment) - 250units/gram apply topically to wound beds with necrotic debris/slough;   Acetic Acid 0.25% to cleanse wounds with odor and signs of infection  Lidocaine Hydrochloride (Xylocaine) topical solution 4% to assist with Wound Vac dressing remove  Silver Sulfadiazine (Silvadene) topical cream; apply topically to burns/wound beds  Hydrocortisone (Cortaid) 1% cream; apply topically to areas with dermatitis  Silver nitrate (Arazol) topical stick to control minor bleeding, and applied to epibole and hypergranular tissue  Negative Pressure Wound Therapy (Wound Vac) (additional physician order needed)  Triple Antibiotic (Neosporin) ointment for wounds with signs of infection or at risk for infection  Topical agents, dressings, or products per Wound Ostomy Protocol Clinical Guidelines  Compression therapy as indicated for lower extremity ulcers caused by venous insufficiency or  complicated by edema.  FILEMON above 0.8 - medium compression; FILEMON 0.6 - 0.8 - light compression  Therapeutic support surfaces for bed and/or chair and off-loading devices to minimize pressure    DIAGNOSTICS THAT MAY BE ORDERED BY CWON OR APRN; ORDERS TO BE PLACED UNDER THE REFERRING PROVIDER OR APRN FOR REVIEW AND ANY NEEDED ACTION:    Wound culture when signs of infection or colonization are present  Ankle Brachial Index (FILEMON) in patients with Lower Extremity ulcerations; Toe Brachial Index (TBI) for diabetics as indicated  Hgb A1c for diabetics or those suspected of undiagnosed diabetes  Albumin or Prealbumin if nutritional concerns   Urinalysis/Urine Culture (UA/UC) if urinary tract infection (UTI) suspected in Urostomy patient  Clostridium difficile toxin B PCR panel stool sample if suspected in Fecal Ostomy patient    ANCILLARY CONSULTS AS NEEDED:  Pedorithist/Prosthetist for specialty shoes/orthotic  Diabetes Education for improved blood glucose during wound healing  Medical Nutrition Therapy for diabetes management and wound and/or ostomy healing nutrition   Physical Therapy/Occupational Therapy - assess tissue loads & need for positioning devices, mobility safety/ ADL's, lymphedema treatment    OSTOMY ONLY:  Preoperative stoma site marking if requested by surgeon  Equipment selection/Ostomy supplies based on assessment  Treatment of stomal and peristomal complications per Wound Ostomy Protocol Clinical Guidelines  Colostomy Irrigation routine for constipation: Irrigate with 500-1000mL warm tap water per patient rountine. Do not exceed 1000mL without physician consultation.  Ileostomy Lavage with lubricated soft catheter by instilling 30-50mL normal saline at a time until resolved. May take 1-2 hours with repeat attempts; if not resolved at that point notify surgeon.    NOTE EXCLUSIONS FROM ABOVE ITEMS HERE:     RENU Gunderson                     Review of your medicines      START taking        Dose /  Directions Last dose taken    mupirocin 2 % cream   Commonly known as:  BACTROBAN   Quantity:  15 g        Apply topically 2 times daily for 7 days   Refills:  0        sulfamethoxazole-trimethoprim 800-160 MG per tablet   Commonly known as:  BACTRIM DS   Dose:  1 tablet   Quantity:  10 tablet        Take 1 tablet by mouth 2 times daily for 5 days   Refills:  0          Our records show that you are taking the medicines listed below. If these are incorrect, please call your family doctor or clinic.        Dose / Directions Last dose taken    ACETAMINOPHEN PO   Dose:  325 mg        Take 325 mg by mouth as needed for pain (4-6 hours)   Refills:  0        ALL DAY ALLERGY 10 MG tablet   Quantity:  30 tablet   Generic drug:  cetirizine        Take one (1) tablet BY MOUTH daily   Refills:  3        blood glucose monitoring test strip   Commonly known as:  EASY TOUCH TEST   Quantity:  50 strip        TEST 1 TIMES BY FINGER POKE ROUTE EVERY DAY [facility] -WE-.00-E11.9   Refills:  5        chlorhexidine 0.12 % solution   Commonly known as:  PERIDEX   Quantity:  473 mL        RINSE WITH 15 MLS ORALLY TWICE DAILY   Refills:  prn        * EASY TOUCH LANCETS 32G/TWIST Misc   Quantity:  100 each        TEST 1 TIMES BY FINGER POKE ROUTE EVERY DAY STS -ID E11.9   Refills:  2        * EASY TOUCH LANCETS 32G/TWIST Misc   Dose:  1 Device   Quantity:  100 each        1 Device by Lancet route daily Use to check blood sugar as directed by your provider.   Refills:  5        metFORMIN 1000 MG tablet   Commonly known as:  GLUCOPHAGE   Quantity:  60 tablet        Take one (1) tablet BY MOUTH twice daily   Refills:  0        * order for DME   Quantity:  1 Device        Equipment being ordered: Wheeled walker with seat  Patient has frequent falls, and need to rest often (needs seat)   Refills:  0        * order for DME   Quantity:  1 Device        Equipment being ordered: Diabetic shoes dx: DM type 2, controlled, last visit 7/24/15,  poor circulation as comorbidity   Refills:  0        ramipril 5 MG capsule   Commonly known as:  ALTACE   Quantity:  30 capsule        Take one (1) capsule BY MOUTH daily   Refills:  0        ranitidine 300 MG tablet   Commonly known as:  ZANTAC   Quantity:  30 tablet        Take one (1) tablet BY MOUTH daily   Refills:  0        simvastatin 10 MG tablet   Commonly known as:  ZOCOR   Quantity:  30 tablet        Take one (1) tablet BY MOUTH daily   Refills:  5        TAB-A-TIGRE Tabs   Quantity:  30 tablet        TAKE 1 TABLET DAILY BY MOUTH TAKE WITH FOOD   Refills:  0        triamcinolone 0.1 % cream   Commonly known as:  KENALOG   Quantity:  80 g        Apply to affected area(s) TOPICALLY twice daily   Refills:  0        * Notice:  This list has 4 medication(s) that are the same as other medications prescribed for you. Read the directions carefully, and ask your doctor or other care provider to review them with you.            Prescriptions were sent or printed at these locations (2 Prescriptions)                   [facility] [address] [place] [address] [address] [address] [facility] [address] & [facility]   [address], [facility] [address]    Telephone:  [phone]   Fax:  [phone]   Hours:                  E-Prescribed (2 of 2)         sulfamethoxazole-trimethoprim (BACTRIM DS) 800-160 MG per tablet               mupirocin (BACTROBAN) 2 % cream                Orders Needing Specimen Collection     None      Pending Results     No orders found from 3/2/2018 to 3/5/2018.            Pending Culture Results     No orders found from 3/2/2018 to 3/5/2018.            Thank you for choosing [facility]       Thank you for choosing [facility] for your care. Our goal is always to provide you with excellent care. Hearing back from our patients is one way we can continue to improve our services. Please take a few minutes to complete the written survey that you may receive in the mail after you visit with us. Thank you!       "  MyChart Information     The [employer] lets you send messages to your doctor, view your test results, renew your prescriptions, schedule appointments and more. To sign up, go to [email or web address] . Click on \"Log in\" on the left side of the screen, which will take you to the Welcome page. Then click on \"Sign up Now\" on the right side of the page.     You will be asked to enter the access code listed below, as well as some personal information. Please follow the directions to create your username and password.     Your access code is: [email or web address]  Expires: 3/26/2018  9:24 AM     Your access code will  in 90 days. If you need help or a new code, please call your [facility] clinic or [phone].        Care EveryWhere ID     This is your Care EveryWhere ID. This could be used by other organizations to access your [facility] medical records  [id number]        Equal Access to Services     ZION DALE : Hadii aquilino arnoldo Soalejandra, waaxda lunanciadaha, qaybta kaalmada adealix, rosaura blas . So [facility] [phone].    ATENCIÓN: Si habla español, tiene a rodrigues disposición servicios gratuitos de asistencia lingüística. Llame al [phone].    We comply with applicable federal civil rights laws and [place] laws. We do not discriminate on the basis of race, color, national origin, age, disability, sex, sexual orientation, or gender identity.            After Visit Summary       This is your record. Keep this with you and show to your community pharmacist(s) and doctor(s) at your next visit.                  "

## 2018-03-04 NOTE — ED PROVIDER NOTES
History     Chief Complaint   Patient presents with     Cellulitis     to left foot. noticed this AM. no abx recently.      HPI  Aki Navarro is a 62 year old male with Hx HTN, DM who presents with left foot sore. He is accompanied by staff that noted the foot sore today. Staff is unsure of onset and Aki is not sure either. There was no concern for sore/ulcer at PCP visit in early February. He does have specialty shoes and staff thinks that the buckle area may have started the sore. NO fevers. NO drainage.    Problem List:    Patient Active Problem List    Diagnosis Date Noted     Arthritis 09/24/2013     Priority: Medium     Advanced care planning/counseling discussion 09/19/2012     Priority: Medium     Essential hypertension, benign 01/11/2011     Priority: Medium     Hyperlipidemia 01/11/2011     Priority: Medium     Mental retardation 01/11/2011     Priority: Medium     Diabetes mellitus, type 2 (H) 04/28/2005     Priority: Medium        Past Medical History:    Past Medical History:   Diagnosis Date     Allergic rhinitis, cause unspecified 1/11/2011     Arthritis 9/24/2013     Essential hypertension, benign 1/11/2011     Impacted cerumen of both ears 10/12/2012     Other and unspecified hyperlipidemia 1/11/2011     Type II or unspecified type diabetes mellitus without mention of complication 4/28/2005     Unspecified intellectual disabilities 1/11/2011       Past Surgical History:    Past Surgical History:   Procedure Laterality Date     MOUTH SURGERY  2008     undescended testicle surgery         Family History:    Family History   Problem Relation Age of Onset     CANCER Mother      brain cancer - cause of death     CANCER Father 76     renal cancer - cause of death     DIABETES Mother 60     DIABETES Sister 50       Social History:  Marital Status:  Single [1]  Social History   Substance Use Topics     Smoking status: Never Smoker     Smokeless tobacco: Never Used     Alcohol use Yes      Comment: 1  beer occasionally        Medications:      sulfamethoxazole-trimethoprim (BACTRIM DS) 800-160 MG per tablet   mupirocin (BACTROBAN) 2 % cream   simvastatin (ZOCOR) 10 MG tablet   triamcinolone (KENALOG) 0.1 % cream   metFORMIN (GLUCOPHAGE) 1000 MG tablet   ramipril (ALTACE) 5 MG capsule   Multiple Vitamin (TAB-A-TIGRE) TABS   EASY TOUCH LANCETS 32G/TWIST MISC   order for DME   chlorhexidine (PERIDEX) 0.12 % solution   ranitidine (ZANTAC) 300 MG tablet   ALL DAY ALLERGY 10 MG tablet   blood glucose monitoring (EASY TOUCH TEST) test strip   EASY TOUCH LANCETS 32G/TWIST MISC   ACETAMINOPHEN PO   ORDER FOR DME         Review of Systems   Constitutional: Negative.    Respiratory: Negative.    Cardiovascular: Negative.    Skin: Positive for color change and wound.   Neurological: Negative.    Psychiatric/Behavioral: Negative.        Physical Exam   BP: 124/84  Heart Rate: 106  Temp: 96.5  F (35.8  C)  Resp: 18  SpO2: 100 %      Physical Exam   Constitutional: He is oriented to person, place, and time. He appears well-developed and well-nourished. No distress.   Cardiovascular:    in triage   Pulmonary/Chest: Effort normal.   Musculoskeletal:        Left ankle: Normal. He exhibits normal range of motion.        Left foot: There is swelling (cellulitis indicated by dotted line, ulcers are clean with granular-appearing tissue).        Feet:    Neurological: He is alert and oriented to person, place, and time.   Skin: Skin is warm and dry.   Psychiatric: He has a normal mood and affect.   Nursing note and vitals reviewed.      ED Course     ED Course     Procedures  There is cleansed with Hibiclens followed by tap water irrigation.  It is allowed to dry.  He did apply topical antibiotic ointment followed by loose gauze and patient's cotton sock for dressing.   Assessments & Plan (with Medical Decision Making)     I have reviewed the nursing notes.    I have reviewed the findings, diagnosis, plan and need for follow up  with the patient.      Discharge Medication List as of 3/4/2018  2:00 PM      START taking these medications    Details   sulfamethoxazole-trimethoprim (BACTRIM DS) 800-160 MG per tablet Take 1 tablet by mouth 2 times daily for 5 days, Disp-10 tablet, R-0, E-Prescribe      mupirocin (BACTROBAN) 2 % cream Apply topically 2 times daily for 7 daysDisp-15 g, [id number]             Final diagnoses:   Skin ulcer of left foot, limited to breakdown of skin (H)   Cellulitis of left lower extremity     We will treat cellulitis with Bactrim as above.  Discussed dressing changes 1-2 times daily loose dressing, clean cotton socks.  Avoid footwear as practical.  Due to history of diabetes will have patient follow-up with primary care versus wound clinic in 3-5 days.  We will seek attention sooner with worsening despite treatment.     3/4/2018   [facility]     Tone Bynum PA  03/04/18 [id number]

## 2018-03-06 DIAGNOSIS — I10 HTN (HYPERTENSION): ICD-10-CM

## 2018-03-06 DIAGNOSIS — L50.9 HIVES: ICD-10-CM

## 2018-03-06 RX ORDER — RAMIPRIL 5 MG/1
CAPSULE ORAL
Qty: 30 CAPSULE | Refills: 11 | Status: SHIPPED | OUTPATIENT
Start: 2018-03-06 | End: 2019-01-29

## 2018-03-07 ENCOUNTER — TELEPHONE (OUTPATIENT)
Dept: FAMILY MEDICINE | Facility: OTHER | Age: 63
End: 2018-03-07

## 2018-03-07 ENCOUNTER — TRANSFERRED RECORDS (OUTPATIENT)
Dept: HEALTH INFORMATION MANAGEMENT | Facility: CLINIC | Age: 63
End: 2018-03-07

## 2018-03-07 DIAGNOSIS — E11.8 TYPE 2 DIABETES MELLITUS WITH COMPLICATION, WITHOUT LONG-TERM CURRENT USE OF INSULIN (H): Primary | ICD-10-CM

## 2018-03-07 LAB — COLOGUARD-ABSTRACT: NEGATIVE

## 2018-03-07 NOTE — TELEPHONE ENCOUNTER
Called [facility] and needs new DME for diabetic shoes faxed to [employer]. Pended for your approval.Thank you.

## 2018-03-08 ENCOUNTER — OFFICE VISIT (OUTPATIENT)
Dept: WOUND CARE | Facility: OTHER | Age: 63
End: 2018-03-08
Attending: PHYSICIAN ASSISTANT
Payer: MEDICARE

## 2018-03-08 VITALS — SYSTOLIC BLOOD PRESSURE: 117 MMHG | TEMPERATURE: 99 F | DIASTOLIC BLOOD PRESSURE: 67 MMHG | HEART RATE: 98 BPM

## 2018-03-08 DIAGNOSIS — L89.890 PRESSURE ULCER OF LEFT FOOT, UNSTAGEABLE (H): Primary | ICD-10-CM

## 2018-03-08 DIAGNOSIS — L89.892 PRESSURE ULCER OF LEFT FOOT, STAGE 2 (H): ICD-10-CM

## 2018-03-08 PROCEDURE — 99213 OFFICE O/P EST LOW 20 MIN: CPT | Performed by: NURSE PRACTITIONER

## 2018-03-08 PROCEDURE — G0463 HOSPITAL OUTPT CLINIC VISIT: HCPCS

## 2018-03-08 NOTE — PROGRESS NOTES
"SUBJECTIVE:  Aki DUVALL Ramon, 62 year old, male presents with the following Chief Complaint(s) with HPI to follow:  Chief Complaint   Patient presents with     WOUND CARE      Wound Care    HPI:  Cortez is here today with Arely from [facility] for the reassessment and treatment of left foot wound.  Back story (as gathered by Arely):  Patient doesn't communicate (or chooses not to communicate)  She believe his Cortez's wound started about one month ago.    They feel that his DM/orthotic shoes might have caused it as he has a buckle/velcro system.    Arely states he wears his shoes \"all the time\" (except for sleeping)    They are in the process of getting him new shoes.    Cortez was seen in the ED/UC on 3/4/18 for this wound.    He was prescribed Bactrim D, 1 tablet BID.  He's still taking this.    For the wound, apply mupirocin topically 2 times daily.    Arely states that Cortez doesn't like the wound covered, so the facility has been using clean socks when they apply the mupirocin.  No other treatment prior to this.      Denies any fevers, chills, and/or malodorous drainage.   PMH: history of MR, diabetes mellitus, type 2. Arely wasn't sure when he was diagnosed but knows that he has had for over 7+ years.    Lab Results   Component Value Date    A1C 6.9 02/08/2018    A1C 6.6 12/22/2016    A1C 6.2 09/13/2016    A1C 6.1 11/12/2015    A1C 6.2 03/26/2015       Patient Active Problem List   Diagnosis     Advanced care planning/counseling discussion     Diabetes mellitus, type 2 (H)     Essential hypertension, benign     Hyperlipidemia     Mental retardation     Arthritis       Past Medical History:   Diagnosis Date     Allergic rhinitis, cause unspecified 1/11/2011     Arthritis 9/24/2013     Essential hypertension, benign 1/11/2011     Impacted cerumen of both ears 10/12/2012     Other and unspecified hyperlipidemia 1/11/2011     Type II or unspecified type diabetes mellitus without mention of complication 4/28/2005 "     Unspecified intellectual disabilities 1/11/2011       Past Surgical History:   Procedure Laterality Date     MOUTH SURGERY  2008     undescended testicle surgery         Family History   Problem Relation Age of Onset     CANCER Mother      brain cancer - cause of death     CANCER Father 76     renal cancer - cause of death     DIABETES Mother 60     DIABETES Sister 50       Social History   Substance Use Topics     Smoking status: Never Smoker     Smokeless tobacco: Never Used     Alcohol use Yes      Comment: 1 beer occasionally       Current Outpatient Prescriptions   Medication Sig Dispense Refill     order for DME Equipment being ordered: order for DME   Sig: Equipment being ordered: Diabetic shoes dx: DM type 2, controlled, last visit 2.18.18 , poor circulation as comorbidity 1 Device 0     order for DME Equipment being ordered:     1.  Silver foam (4x4 in)  Apply to affected areas  Disp: 5  Refill: 3    2.  Medipore tape (4 inch)  Apply to affected area  Disp: 5  Refill: 3 5 each 3     ramipril (ALTACE) 5 MG capsule Take one (1) capsule BY MOUTH daily 30 capsule 11     ranitidine (ZANTAC) 300 MG tablet Take one (1) tablet BY MOUTH daily 30 tablet 5     sulfamethoxazole-trimethoprim (BACTRIM DS) 800-160 MG per tablet Take 1 tablet by mouth 2 times daily for 5 days 10 tablet 0     mupirocin (BACTROBAN) 2 % cream Apply topically 2 times daily for 7 days 15 g 0     EASY TOUCH LANCETS 32G/TWIST MISC 1 Device by Lancet route daily Use to check blood sugar as directed by your provider. 100 each 5     simvastatin (ZOCOR) 10 MG tablet Take one (1) tablet BY MOUTH daily 30 tablet 5     order for DME Equipment being ordered: Diabetic shoes dx: DM type 2, controlled, last visit 7/24/15, poor circulation as comorbidity 1 Device 0     triamcinolone (KENALOG) 0.1 % cream Apply to affected area(s) TOPICALLY twice daily 80 g 0     metFORMIN (GLUCOPHAGE) 1000 MG tablet Take one (1) tablet BY MOUTH twice daily 60 tablet 0      chlorhexidine (PERIDEX) 0.12 % solution RINSE WITH 15 MLS ORALLY TWICE DAILY 473 mL prn     ALL DAY ALLERGY 10 MG tablet Take one (1) tablet BY MOUTH daily 30 tablet 3     blood glucose monitoring (EASY TOUCH TEST) test strip TEST 1 TIMES BY FINGER POKE ROUTE EVERY DAY [facility] -FB-.00-E11.9 50 strip 5     Multiple Vitamin (TAB-A-TIGRE) TABS TAKE 1 TABLET DAILY BY MOUTH TAKE WITH FOOD 30 tablet 0     EASY TOUCH LANCETS 32G/TWIST MISC TEST 1 TIMES BY FINGER POKE ROUTE EVERY DAY [facility] -UT E11.9 100 each 2     ACETAMINOPHEN PO Take 325 mg by mouth as needed for pain (4-6 hours)        ORDER FOR DME Equipment being ordered: Wheeled walker with seat    Patient has frequent falls, and need to rest often (needs seat) 1 Device 0       Allergies   Allergen Reactions     Tape [Adhesive Tape]        REVIEW OF SYSTEMS  Skin: as noted above  Eyes: negative  Ears/Nose/Throat: negative  Respiratory: No shortness of breath, dyspnea on exertion, cough, or hemoptysis  Cardiovascular: negative  Gastrointestinal: negative  Genitourinary: negative  Musculoskeletal: uses a walker to walk  Neurologic: negative  Psychiatric: history of MR  Hematologic/Lymphatic/Immunologic: negative  Endocrine: positive for diabetes    OBJECTIVE:  /67  Pulse 98  Temp 99  F (37.2  C)  Constitutional: healthy, alert and no distress  Cardiovascular: RRR. No murmurs, clicks gallops or rub  Respiratory:  Good diaphragmatic excursion. Lungs clear  Skin:   Wound description: Type of Wound- pressure ulcer, stage 2; Location- left foot, dorsal/medial foot, Drainage amount- scant (on sock), Drainage color- serous , Odor- none; Wound bed- marbled, Surrounding skin-blanchable erythema, no increased heat, no lymphangitis.  Measurements: 2.6 x 1.4  Dressing change: Wound cleansed with soap and water, dried, dressed with silver foam and tape.  Skin barrier prep used prior to tape placement    Wound description: Type of Wound- pressure ulcer,  unstageable; Location- left foot, dorsal foot, Drainage amount-none, Drainage color-n/a, Odor- none; Wound bed-100% with thin adhered tan eschar, Surrounding skin-?ecchymosis.  Measurements: 0.6 x 0.6  Dressing change: Wound cleansed with soap and water, dried, dressed with silver foam + medipore tape (skin barrier prep used prior to tape placement).      Arely doesn't have any documentation about tape allergy.   Cortez unable to tell us if it's been used in the past.    He shook his head no when Arely asked.      Psychiatric: mentation appears normal and affect normal/bright      LABS  Results for orders placed or performed in visit on 02/08/18   Albumin Random Urine Quantitative with Creat Ratio   Result Value Ref Range    Creatinine Urine 24 mg/dL    Albumin Urine mg/L 22 mg/L    Albumin Urine mg/g Cr 90.04 (H) 0 - 17 mg/g Cr   ALT   Result Value Ref Range    ALT 32 0 - 70 U/L   Basic metabolic panel   Result Value Ref Range    Sodium 138 133 - 144 mmol/L    Potassium 4.3 3.4 - 5.3 mmol/L    Chloride 102 94 - 109 mmol/L    Carbon Dioxide 27 20 - 32 mmol/L    Anion Gap 9 3 - 14 mmol/L    Glucose 211 (H) 70 - 99 mg/dL    Urea Nitrogen 18 7 - 30 mg/dL    Creatinine 0.92 0.66 - 1.25 mg/dL    GFR Estimate 84 >60 mL/min/1.7m2    GFR Estimate If Black >90 >60 mL/min/1.7m2    Calcium 8.6 8.5 - 10.1 mg/dL   Hemoglobin A1c   Result Value Ref Range    Hemoglobin A1C 6.9 (H) 4.3 - 6.0 %   Lipid Profile   Result Value Ref Range    Cholesterol 111 <200 mg/dL    Triglycerides 263 (H) <150 mg/dL    HDL Cholesterol 45 >39 mg/dL    LDL Cholesterol Calculated 13 <100 mg/dL    Non HDL Cholesterol 66 <130 mg/dL   TSH   Result Value Ref Range    TSH 3.17 0.40 - 4.00 mU/L   PSA, screen   Result Value Ref Range    PSA 1.20 0 - 4 ug/L   Estimated Average Glucose   Result Value Ref Range    Estimated Average Glucose 151 mg/dL       ASSESSMENT / PLAN:  (L89.890) Pressure ulcer of left foot, unstageable (H)  (primary encounter  diagnosis)  Comment: plan developed  Cortez wasn't impressed that we need to cover the wound.   Explained we need to protect it     Plan:   Rx sent for supplies.   Follow up in one week    (L89.892) Pressure ulcer of left foot, stage 2  Comment: plan developed  Plan: order for DME          Treatment goal: moisture balance and prevent infection    Patient Instructions   Wash hands prior to dressing change.   Clean instruments as appropriate    Wash wound with mild soap and water, dry (don't soak foot)  Wipe surround skin (where tape will be placed) with skin barrier prep pad  Apply silver foam (remove plastic backing, stick side down on the wound)--cut to fit to both wounds   Secure with medipore tape  Change every day or as needed    Please call if any questions/concerns and/or problems ([phone])    Follow up one week        Time: 40 minutes  Barrier: see PMH  Willingness to learn: somewhat accepting    Samira CASTELLON FNP-BC  Disease Management    Cc: Dr. Nath

## 2018-03-08 NOTE — PATIENT INSTRUCTIONS
Wash hands prior to dressing change.   Clean instruments as appropriate    Wash wound with mild soap and water, dry (don't soak foot)  Wipe surround skin (where tape will be placed) with skin barrier prep pad  Apply silver foam (remove plastic backing, stick side down on the wound)--cut to fit to both wounds   Secure with medipore tape  Change every day or as needed    Please call if any questions/concerns and/or problems ([phone])    Follow up one week

## 2018-03-08 NOTE — MR AVS SNAPSHOT
After Visit Summary   3/8/2018    [facility]    MRN: [id number]           Patient Information     Date Of Birth          1955        Visit Information        Provider Department      3/8/2018 3:30 PM [facility]        Today's Diagnoses     Skin ulcer of left foot, limited to breakdown of skin (H)          Care Instructions    Wash hands prior to dressing change.   Clean instruments as appropriate    Wash wound with mild soap and water, dry (don't soak foot)  Wipe surround skin (where tape will be placed) with skin barrier prep pad  Apply silver foam (remove plastic backing, stick side down on the wound)--cut to fit to both wounds   Secure with medipore tape  Change every day or as needed    Please call if any questions/concerns and/or problems ([phone])    Follow up one week              Follow-ups after your visit        Your next 10 appointments already scheduled     Mar 26, 2018 10:15 AM CDT   (Arrive by 10:00 AM)   Hearing Eval with Ilsa Alegria   [facility] So ([facility] )    [address] [place] [address]   [phone]              Who to contact     If you have questions or need follow up information about today's clinic visit or your schedule please contact [facility] directly at [phone].  Normal or non-critical lab and imaging results will be communicated to you by MyChart, letter or phone within 4 business days after the clinic has received the results. If you do not hear from us within 7 days, please contact the clinic through MyChart or phone. If you have a critical or abnormal lab result, we will notify you by phone as soon as possible.  Submit refill requests through [employer] or call your pharmacy and they will forward the refill request to us. Please allow 3 business days for your refill to be completed.          Additional Information About Your Visit        [facility]  "Information     MeetMe lets you send messages to your doctor, view your test results, renew your prescriptions, schedule appointments and more. To sign up, go to [email or web address] . Click on \"Log in\" on the left side of the screen, which will take you to the Welcome page. Then click on \"Sign up Now\" on the right side of the page.     You will be asked to enter the access code listed below, as well as some personal information. Please follow the directions to create your username and password.     Your access code is: [email or web address]  Expires: 3/26/2018  9:24 AM     Your access code will  in 90 days. If you need help or a new code, please call your [place] clinic or [phone].        Care EveryWhere ID     This is your Care EveryWhere ID. This could be used by other organizations to access your [place] medical records  [id number]        Your Vitals Were     Pulse Temperature                98 99  F (37.2  C)           Blood Pressure from Last 3 Encounters:   18 117/67   18 124/84   18 110/60    Weight from Last 3 Encounters:   18 152 lb (68.9 kg)   16 138 lb (62.6 kg)   16 143 lb (64.9 kg)              Today, you had the following     No orders found for display       Primary Care Provider Office Phone # Fax #    Nicole [facility] [phone] [address] [facility] [address]        Equal Access to Services     [facility] : Hadii aquilino arnoldo Soalejandra, waaxda luqadaha, qaybta kaalmada maverick, rosaura farrar. So [facility] [phone].    ATENCIÓN: Si habla español, tiene a rodrigues disposición servicios gratuitos de asistencia lingüística. Llame al [phone].    We comply with applicable federal civil rights laws and [place] laws. We do not discriminate on the basis of race, color, national origin, age, disability, sex, sexual orientation, or gender identity.            Thank you!     Thank you for " choosing [facility]  for your care. Our goal is always to provide you with excellent care. Hearing back from our patients is one way we can continue to improve our services. Please take a few minutes to complete the written survey that you may receive in the mail after your visit with us. Thank you!             Your Updated Medication List - Protect others around you: Learn how to safely use, store and throw away your medicines at www.disposemymeds.org.          This list is accurate as of 3/8/18  4:08 PM.  Always use your most recent med list.                   Brand Name Dispense Instructions for use Diagnosis    ACETAMINOPHEN PO      Take 325 mg by mouth as needed for pain (4-6 hours)        ALL DAY ALLERGY 10 MG tablet   Generic drug:  cetirizine     30 tablet    Take one (1) tablet BY MOUTH daily    Hives       blood glucose monitoring test strip    EASY TOUCH TEST    50 strip    TEST 1 TIMES BY FINGER POKE ROUTE EVERY DAY [facility] -NS-.00-E11.9    Diabetes mellitus, type 2 (H)       chlorhexidine 0.12 % solution    PERIDEX    473 mL    RINSE WITH 15 MLS ORALLY TWICE DAILY    Healthcare maintenance       * EASY TOUCH LANCETS 32G/TWIST Misc     100 each    TEST 1 TIMES BY FINGER POKE ROUTE EVERY DAY [facility] -MJ E11.9    Diabetes mellitus, type 2 (H)       * EASY TOUCH LANCETS 32G/TWIST Misc     100 each    1 Device by Lancet route daily Use to check blood sugar as directed by your provider.    Diabetes mellitus, type 2 (H)       metFORMIN 1000 MG tablet    GLUCOPHAGE    60 tablet    Take one (1) tablet BY MOUTH twice daily    Type 2 diabetes mellitus without complication, without long-term current use of insulin (H)       mupirocin 2 % cream    BACTROBAN    15 g    Apply topically 2 times daily for 7 days        * order for DME     1 Device    Equipment being ordered: Wheeled walker with seat  Patient has frequent falls, and need to rest often (needs seat)    Unspecified intellectual  disabilities, Arthritis       * order for DME     1 Device    Equipment being ordered: Diabetic shoes dx: DM type 2, controlled, last visit 7/24/15, poor circulation as comorbidity    Type 2 diabetes mellitus without complication, without long-term current use of insulin (H)       * order for DME     1 Device    Equipment being ordered: order for DME Sig: Equipment being ordered: Diabetic shoes dx: DM type 2, controlled, last visit 2.18.18 , poor circulation as comorbidity    Type 2 diabetes mellitus with complication, without long-term current use of insulin (H)       ramipril 5 MG capsule    ALTACE    30 capsule    Take one (1) capsule BY MOUTH daily    HTN (hypertension)       ranitidine 300 MG tablet    ZANTAC    30 tablet    Take one (1) tablet BY MOUTH daily    Hives       simvastatin 10 MG tablet    ZOCOR    30 tablet    Take one (1) tablet BY MOUTH daily    Mixed hyperlipidemia       sulfamethoxazole-trimethoprim 800-160 MG per tablet    BACTRIM DS    10 tablet    Take 1 tablet by mouth 2 times daily for 5 days        TAB-A-TIGRE Tabs     30 tablet    TAKE 1 TABLET DAILY BY MOUTH TAKE WITH FOOD    Health care maintenance       triamcinolone 0.1 % cream    KENALOG    80 g    Apply to affected area(s) TOPICALLY twice daily    Contact dermatitis due to poison ivy       * Notice:  This list has 5 medication(s) that are the same as other medications prescribed for you. Read the directions carefully, and ask your doctor or other care provider to review them with you.

## 2018-03-09 DIAGNOSIS — H91.93 DECREASED HEARING OF BOTH EARS: Primary | ICD-10-CM

## 2018-03-15 ENCOUNTER — ALLIED HEALTH/NURSE VISIT (OUTPATIENT)
Dept: FAMILY MEDICINE | Facility: OTHER | Age: 63
End: 2018-03-15
Attending: FAMILY MEDICINE
Payer: MEDICARE

## 2018-03-15 DIAGNOSIS — Z23 NEED FOR VACCINATION: Primary | ICD-10-CM

## 2018-03-15 PROCEDURE — G0009 ADMIN PNEUMOCOCCAL VACCINE: HCPCS

## 2018-03-15 PROCEDURE — 90670 PCV13 VACCINE IM: CPT

## 2018-03-15 NOTE — NURSING NOTE
Patient and staff come in for PCV 13,  I went to get authorization from the doctor due to age of only 62 years old,  With the diagnosis of DM II it was approved per [facility] verbal authorization, injection administered, paperwork filled out

## 2018-03-15 NOTE — MR AVS SNAPSHOT
"              After Visit Summary   3/15/2018    Aki Navarro    MRN: [id number]           Patient Information     Date Of Birth          1955        Visit Information        Provider Department      3/15/2018 11:00 AM [facility] NURSE [facility]        Today's Diagnoses     Need for vaccination    -  1       Follow-ups after your visit        Your next 10 appointments already scheduled     Mar 16, 2018  2:00 PM CDT   Return Visit with [facility]   [facility] Ost[facility] ([facility] )    [address] [place] [id number] [phone]            Mar 26, 2018 10:15 AM CDT   (Arrive by 10:00 AM)   Hearing Eval with Ilsa Alegria   [facility] ([facility] )    [address]  [facility] [address]   [phone]              Who to contact     If you have questions or need follow up information about today's clinic visit or your schedule please contact [facility] ([facility]) directly at [phone].  Normal or non-critical lab and imaging results will be communicated to you by [employer], letter or phone within 4 business days after the clinic has received the results. If you do not hear from us within 7 days, please contact the clinic through [employer] or phone. If you have a critical or abnormal lab result, we will notify you by phone as soon as possible.  Submit refill requests through [email or web address] or call your pharmacy and they will forward the refill request to us. Please allow 3 business days for your refill to be completed.          Additional Information About Your Visit        [employer]oke Information     [email or web address] lets you send messages to your doctor, view your test results, renew your prescriptions, schedule appointments and more. To sign up, go to [email or web address] . Click on \"Log in\" on the left side of the screen, which will take you to the Welcome page. Then click on \"Sign up Now\" on the right side of the page.     You will be asked to " enter the access code listed below, as well as some personal information. Please follow the directions to create your username and password.     Your access code is: [email or web address]  Expires: 3/26/2018 10:24 AM     Your access code will  in 90 days. If you need help or a new code, please call your [place] clinic or [phone].        Care EveryWhere ID     This is your Care EveryWhere ID. This could be used by other organizations to access your [place] medical records  [id number]         Blood Pressure from Last 3 Encounters:   18 117/67   18 124/84   18 110/60    Weight from Last 3 Encounters:   18 152 lb (68.9 kg)   16 138 lb (62.6 kg)   16 143 lb (64.9 kg)              We Performed the Following     ADMIN MEDICARE: Pneumococcal Vaccine ()     Pneumococcal vaccine 13 valent PCV13 IM (Prevnar) [30293]        Primary Care Provider Office Phone # Fax #    Nicole Rojas [phone] [address] [facility] [address]        Equal Access to Services     [facility] : Hadii aquilino arnoldo Soalejandra, waaxda luqadaha, qaybta kaalmada adekatherynyada, rosaura farrar. So [facility] [phone].    ATENCIÓN: Si habla español, tiene a rodrigues disposición servicios gratuitos de asistencia lingüística. [facility] [phone].    We comply with applicable federal civil rights laws and [place] laws. We do not discriminate on the basis of race, color, national origin, age, disability, sex, sexual orientation, or gender identity.            Thank you!     Thank you for choosing [facility]  for your care. Our goal is always to provide you with excellent care. Hearing back from our patients is one way we can continue to improve our services. Please take a few minutes to complete the written survey that you may receive in the mail after your visit with us. Thank you!             Your Updated Medication List - Protect  others around you: Learn how to safely use, store and throw away your medicines at www.disposemymeds.org.          This list is accurate as of 3/15/18 11:30 AM.  Always use your most recent med list.                   Brand Name Dispense Instructions for use Diagnosis    ACETAMINOPHEN PO      Take 325 mg by mouth as needed for pain (4-6 hours)        ALL DAY ALLERGY 10 MG tablet   Generic drug:  cetirizine     30 tablet    Take one (1) tablet BY MOUTH daily    Hives       blood glucose monitoring test strip    EASY TOUCH TEST    50 strip    TEST 1 TIMES BY FINGER POKE ROUTE EVERY DAY [facility] -JP-.00-E11.9    Diabetes mellitus, type 2 (H)       chlorhexidine 0.12 % solution    PERIDEX    473 mL    RINSE WITH 15 MLS ORALLY TWICE DAILY    Healthcare maintenance       * EASY TOUCH LANCETS 32G/TWIST Misc     100 each    TEST 1 TIMES BY FINGER POKE ROUTE EVERY DAY [facility] -PA E11.9    Diabetes mellitus, type 2 (H)       * EASY TOUCH LANCETS 32G/TWIST Misc     100 each    1 Device by Lancet route daily Use to check blood sugar as directed by your provider.    Diabetes mellitus, type 2 (H)       metFORMIN 1000 MG tablet    GLUCOPHAGE    60 tablet    Take one (1) tablet BY MOUTH twice daily    Type 2 diabetes mellitus without complication, without long-term current use of insulin (H)       * order for DME     1 Device    Equipment being ordered: Wheeled walker with seat  Patient has frequent falls, and need to rest often (needs seat)    Unspecified intellectual disabilities, Arthritis       * order for DME     1 Device    Equipment being ordered: Diabetic shoes dx: DM type 2, controlled, last visit 7/24/15, poor circulation as comorbidity    Type 2 diabetes mellitus without complication, without long-term current use of insulin (H)       * order for DME     1 Device    Equipment being ordered: order for DME Sig: Equipment being ordered: Diabetic shoes dx: DM type 2, controlled, last visit 2.18.18 , poor circulation  as comorbidity    Type 2 diabetes mellitus with complication, without long-term current use of insulin (H)       * order for DME     5 each    Equipment being ordered:   1.  Silver foam (4x4 in) Apply to affected areas Disp: 5 Refill: 3  2.  Medipore tape (4 inch) Apply to affected area Disp: 5 Refill: 3    Pressure ulcer of left foot, stage 2       ramipril 5 MG capsule    ALTACE    30 capsule    Take one (1) capsule BY MOUTH daily    HTN (hypertension)       ranitidine 300 MG tablet    ZANTAC    30 tablet    Take one (1) tablet BY MOUTH daily    Hives       simvastatin 10 MG tablet    ZOCOR    30 tablet    Take one (1) tablet BY MOUTH daily    Mixed hyperlipidemia       TAB-A-TIGRE Tabs     30 tablet    TAKE 1 TABLET DAILY BY MOUTH TAKE WITH FOOD    Health care maintenance       triamcinolone 0.1 % cream    KENALOG    80 g    Apply to affected area(s) TOPICALLY twice daily    Contact dermatitis due to poison ivy       * Notice:  This list has 6 medication(s) that are the same as other medications prescribed for you. Read the directions carefully, and ask your doctor or other care provider to review them with you.

## 2018-03-16 ENCOUNTER — HOSPITAL ENCOUNTER (OUTPATIENT)
Dept: WOUND CARE | Facility: HOSPITAL | Age: 63
Discharge: HOME | End: 2018-03-16
Attending: FAMILY MEDICINE | Admitting: PHYSICIAN ASSISTANT
Payer: MEDICARE

## 2018-03-16 VITALS — DIASTOLIC BLOOD PRESSURE: 78 MMHG | SYSTOLIC BLOOD PRESSURE: 130 MMHG | HEART RATE: 102 BPM | TEMPERATURE: 96.7 F

## 2018-03-16 PROCEDURE — G0463 HOSPITAL OUTPT CLINIC VISIT: HCPCS

## 2018-03-16 NOTE — PROGRESS NOTES
"Aki DUVALL [facility], 1955  Chief Complaint   Patient presents with     WOUND CARE     left foot ulcer       Patient Active Problem List   Diagnosis     Advanced care planning/counseling discussion     Diabetes mellitus, type 2 (H)     Essential hypertension, benign     Hyperlipidemia     Mental retardation     Arthritis       Concerns/Comments since last visits: Aki is here today with one of the [facility] Staff for follow up of ulcerations on his dorsal foot.   They have been using Mepilex AG on his foot and changing daily.  Aki wears orthotic shoes and is waiting for a new pair.  Per his caregiver, pt has a tendency to \"pick\" and thinks the wounds may be self-inflicted.  Cortez does not communicate with me.  He shows no signs of pain or discomfort    Back story per ROSEANNE Jeffery charting (as gathered by Arely):  Patient doesn't communicate (or chooses not to communicate)  She believe his Cortez's wound started about one month ago.    They feel that his DM/orthotic shoes might have caused it as he has a buckle/velcro system.    Arely states he wears his shoes \"all the time\" (except for sleeping)    They are in the process of getting him new shoes.    Cortez was seen in the ED/UC on 3/4/18 for this wound.    He was prescribed Bactrim D, 1 tablet BID.  He's still taking this.    For the wound, apply mupirocin topically 2 times daily.    Arely states that Cortez doesn't like the wound covered, so the facility has been using clean socks when they apply the mupirocin.  No other treatment prior to this.      Denies any fevers, chills, and/or malodorous drainage.   PMH: history of MR, diabetes mellitus, type 2. Arely wasn't sure when he was diagnosed but knows that he has had for over 7+ years.      Objective:   There are 4 open areas on his left  dorsal foot.  One of them is new.  There is scant serosanguinous drainage without odor on his old dressing.  Wound edges are attached.  Bases are pink/red with marbled tan " "tissue.    Wounds from medial to lateral foot    Dorsal medial - 1.6 x 0.8 (significant improvement)    Dorsal top 0.8 x 0.8 cm (slightly larger)    Dorsal top 0.4 x 0.3 cm    Dorsal lateral 0.3 x 0.3 cm (new)     Procedures:   Cleansed and evaluted    Assessments  New areas - possibly from pt \"picking\"  Significant improvement in largest wound    Plan of care:   Change frequency to every other day  Cleanse and apply Mepilex AG over all areas  Secure with medipore tape    Treatment Goals:  Prevent new trauma to area  Prevent infection, provide antimicrobial coverage and cushioning from shoes    Supplies provided:  Has refills at medical supply    Education:  Wound care instructions/education provided. Via interagency transfer form. Staff person verbalized understanding of instruction/education.      CC: Nicole Rojas                  "

## 2018-03-20 DIAGNOSIS — E11.9 TYPE 2 DIABETES MELLITUS WITHOUT COMPLICATION, WITHOUT LONG-TERM CURRENT USE OF INSULIN (H): ICD-10-CM

## 2018-03-26 ENCOUNTER — OFFICE VISIT (OUTPATIENT)
Dept: AUDIOLOGY | Facility: OTHER | Age: 63
End: 2018-03-26
Attending: AUDIOLOGIST
Payer: MEDICARE

## 2018-03-26 DIAGNOSIS — H90.3 SENSORINEURAL HEARING LOSS, BILATERAL: Primary | ICD-10-CM

## 2018-03-26 PROCEDURE — 92579 VISUAL AUDIOMETRY (VRA): CPT | Performed by: AUDIOLOGIST

## 2018-03-26 PROCEDURE — 92567 TYMPANOMETRY: CPT | Performed by: AUDIOLOGIST

## 2018-03-26 PROCEDURE — 92555 SPEECH THRESHOLD AUDIOMETRY: CPT | Performed by: AUDIOLOGIST

## 2018-03-26 NOTE — MR AVS SNAPSHOT
"              After Visit Summary   3/26/2018    Aki Navarro    MRN: [id number]           Patient Information     Date Of Birth          1955        Visit Information        Provider Department      3/26/2018 10:15 AM Keisha Ellis AuD [facility]        Today's Diagnoses     Sensorineural hearing loss, bilateral    -  1       Follow-ups after your visit        Your next 10 appointments already scheduled     Apr 02, 2018  3:00 PM CDT   (Arrive by 2:45 PM)   Return Visit with CANDE [facility] [place] ([facility] [place] )    [address]  [place] [id number] [phone]              Who to contact     If you have questions or need follow up information about today's clinic visit or your schedule please contact [facility] directly at [phone].  Normal or non-critical lab and imaging results will be communicated to you by MyChart, letter or phone within 4 business days after the clinic has received the results. If you do not hear from us within 7 days, please contact the clinic through MyChart or phone. If you have a critical or abnormal lab result, we will notify you by phone as soon as possible.  Submit refill requests through ChoreMonster or call your pharmacy and they will forward the refill request to us. Please allow 3 business days for your refill to be completed.          Additional Information About Your Visit        MyChart Information     ChoreMonster lets you send messages to your doctor, view your test results, renew your prescriptions, schedule appointments and more. To sign up, go to [email or web address] . Click on \"Log in\" on the left side of the screen, which will take you to the Welcome page. Then click on \"Sign up Now\" on the right side of the page.     You will be asked to enter the access code listed below, as well as some personal information. Please follow the directions to create your username and " password.     Your access code is: [email or web address]  Expires: 2018 12:10 PM     Your access code will  in 90 days. If you need help or a new code, please call your [place] clinic or [phone].        Care EveryWhere ID     This is your Care EveryWhere ID. This could be used by other organizations to access your [place] medical records  [id number]         Blood Pressure from Last 3 Encounters:   18 130/78   18 117/67   18 124/84    Weight from Last 3 Encounters:   18 152 lb (68.9 kg)   16 138 lb (62.6 kg)   16 143 lb (64.9 kg)              We Performed the Following     AUDIOGRAM/TYMPANOGRAM - INTERFACE        Primary Care Provider Office Phone # Fax #    Nicole Rojas [phone] [facility] [address]        Equal Access to Services     [facility] : Hadii aad ku hadasho Soomaali, waaxda luqadaha, qaybta kaalmada adeegyada, waxay idiin hayaan koki blas . So [facility] [phone].    ATENCIÓN: Si habla español, tiene a rodrigues disposición servicios gratuitos de asistencia lingüística. Llame al [phone].    We comply with applicable federal civil rights laws and [place] laws. We do not discriminate on the basis of race, color, national origin, age, disability, sex, sexual orientation, or gender identity.            Thank you!     Thank you for choosing [facility]  for your care. Our goal is always to provide you with excellent care. Hearing back from our patients is one way we can continue to improve our services. Please take a few minutes to complete the written survey that you may receive in the mail after your visit with us. Thank you!             Your Updated Medication List - Protect others around you: Learn how to safely use, store and throw away your medicines at www.disposemymeds.org.          This list is accurate as of 3/26/18 12:10 PM.  Always use your most recent med list.                    Brand Name Dispense Instructions for use Diagnosis    ACETAMINOPHEN PO      Take 325 mg by mouth as needed for pain (4-6 hours)        ALL DAY ALLERGY 10 MG tablet   Generic drug:  cetirizine     30 tablet    Take one (1) tablet BY MOUTH daily    Hives       blood glucose monitoring test strip    EASY TOUCH TEST    50 strip    TEST 1 TIMES BY FINGER POKE ROUTE EVERY DAY [facility] -ZT-.00-E11.9    Diabetes mellitus, type 2 (H)       chlorhexidine 0.12 % solution    PERIDEX    473 mL    RINSE WITH 15 MLS ORALLY TWICE DAILY    Healthcare maintenance       * EASY TOUCH LANCETS 32G/TWIST Misc     100 each    TEST 1 TIMES BY FINGER POKE ROUTE EVERY DAY [facility] -WO E11.9    Diabetes mellitus, type 2 (H)       * EASY TOUCH LANCETS 32G/TWIST Misc     100 each    1 Device by Lancet route daily Use to check blood sugar as directed by your provider.    Diabetes mellitus, type 2 (H)       metFORMIN 1000 MG tablet    GLUCOPHAGE    60 tablet    Take one (1) tablet BY MOUTH twice daily    Type 2 diabetes mellitus without complication, without long-term current use of insulin (H)       order for DME     1 Device    Equipment being ordered: Wheeled walker with seat  Patient has frequent falls, and need to rest often (needs seat)    Unspecified intellectual disabilities, Arthritis       * order for DME     1 Device    Equipment being ordered: Diabetic shoes dx: DM type 2, controlled, last visit 7/24/15, poor circulation as comorbidity    Type 2 diabetes mellitus without complication, without long-term current use of insulin (H)       * order for DME     1 Device    Equipment being ordered: order for DME Sig: Equipment being ordered: Diabetic shoes dx: DM type 2, controlled, last visit 2.18.18 , poor circulation as comorbidity    Type 2 diabetes mellitus with complication, without long-term current use of insulin (H)       * order for DME     5 each    Equipment being ordered:   1.  Silver foam (4x4 in) Apply to  affected areas Disp: 5 Refill: 3  2.  Medipore tape (4 inch) Apply to affected area Disp: 5 Refill: 3    Pressure ulcer of left foot, stage 2       ramipril 5 MG capsule    ALTACE    30 capsule    Take one (1) capsule BY MOUTH daily    HTN (hypertension)       ranitidine 300 MG tablet    ZANTAC    30 tablet    Take one (1) tablet BY MOUTH daily    Hives       simvastatin 10 MG tablet    ZOCOR    30 tablet    Take one (1) tablet BY MOUTH daily    Mixed hyperlipidemia       TAB-A-TIGRE Tabs     30 tablet    TAKE 1 TABLET DAILY BY MOUTH TAKE WITH FOOD    Health care maintenance       triamcinolone 0.1 % cream    KENALOG    80 g    Apply to affected area(s) TOPICALLY twice daily    Contact dermatitis due to poison ivy       * Notice:  This list has 5 medication(s) that are the same as other medications prescribed for you. Read the directions carefully, and ask your doctor or other care provider to review them with you.

## 2018-03-26 NOTE — PROGRESS NOTES
Audiology Evaluation Completed. Please refer SCANNED AUDIOGRAM and/or TYMPANOGRAM for BACKGROUND, RESULTS, RECOMMENDATIONS.    Keisha Ellis M.S., [facility]  Audiologist #[id number]

## 2018-04-05 ENCOUNTER — OFFICE VISIT (OUTPATIENT)
Dept: WOUND CARE | Facility: OTHER | Age: 63
End: 2018-04-05
Attending: NURSE PRACTITIONER
Payer: MEDICARE

## 2018-04-05 VITALS
TEMPERATURE: 97.8 F | HEART RATE: 82 BPM | WEIGHT: 150 LBS | HEIGHT: 62 IN | BODY MASS INDEX: 27.6 KG/M2 | SYSTOLIC BLOOD PRESSURE: 115 MMHG | DIASTOLIC BLOOD PRESSURE: 76 MMHG

## 2018-04-05 DIAGNOSIS — L89.892 PRESSURE ULCER OF LEFT FOOT, STAGE 2 (H): Primary | ICD-10-CM

## 2018-04-05 PROCEDURE — 99213 OFFICE O/P EST LOW 20 MIN: CPT | Performed by: NURSE PRACTITIONER

## 2018-04-05 PROCEDURE — G0463 HOSPITAL OUTPT CLINIC VISIT: HCPCS

## 2018-04-05 ASSESSMENT — PAIN SCALES - GENERAL: PAINLEVEL: NO PAIN (0)

## 2018-04-05 NOTE — PATIENT INSTRUCTIONS
Wash hands prior to dressing change.   Clean instruments as appropriate    Wash wound with mild soap and water, dry (don't soak foot)  Wipe surround skin (where tape will be placed) with skin barrier prep pad  Apply silver foam (remove plastic backing, stick side down on the wound)--cut to fit to both wounds   Secure with medipore tape  Change every day or as needed    Please call if any questions/concerns and/or problems ([phone])    Follow up as scheduled

## 2018-04-05 NOTE — MR AVS SNAPSHOT
After Visit Summary   4/5/2018    Aki Navarro    MRN: [id number]           Patient Information     Date Of Birth          1955        Visit Information        Provider Department      4/5/2018 2:00 PM Samira Jeffery NP [facility] [place]        Today's Diagnoses     Pressure ulcer of left foot, stage 2    -  1      Care Instructions    Wash hands prior to dressing change.   Clean instruments as appropriate    Wash wound with mild soap and water, dry (don't soak foot)  Wipe surround skin (where tape will be placed) with skin barrier prep pad  Apply silver foam (remove plastic backing, stick side down on the wound)--cut to fit to both wounds   Secure with medipore tape  Change every day or as needed    Please call if any questions/concerns and/or problems ([phone])    Follow up as scheduled              Follow-ups after your visit        Follow-up notes from your care team     Return in about 1 week (around 4/12/2018).      Your next 10 appointments already scheduled     Apr 19, 2018 11:00 AM CDT   (Arrive by 10:45 AM)   Return Visit with Samria Jeffery NP   [facility] [place] ([facility] )    [address]  [place] [address] [phone]              Who to contact     If you have questions or need follow up information about today's clinic visit or your schedule please contact [facility] directly at [phone].  Normal or non-critical lab and imaging results will be communicated to you by MyChart, letter or phone within 4 business days after the clinic has received the results. If you do not hear from us within 7 days, please contact the clinic through MyChart or phone. If you have a critical or abnormal lab result, we will notify you by phone as soon as possible.  Submit refill requests through [employer] or call your pharmacy and they will forward the refill request to us. Please allow 3 business days for your refill to be  "completed.          Additional Information About Your Visit        [employer] Information     [employer] lets you send messages to your doctor, view your test results, renew your prescriptions, schedule appointments and more. To sign up, go to [email or web address] . Click on \"Log in\" on the left side of the screen, which will take you to the Welcome page. Then click on \"Sign up Now\" on the right side of the page.     You will be asked to enter the access code listed below, as well as some personal information. Please follow the directions to create your username and password.     Your access code is: [email or web address]  Expires: 2018 12:10 PM     Your access code will  in 90 days. If you need help or a new code, please call your [place] clinic or [phone].        Care EveryWhere ID     This is your Care EveryWhere ID. This could be used by other organizations to access your [place] medical records  [id number]        Your Vitals Were     Pulse Temperature Height BMI (Body Mass Index)          82 97.8  F (36.6  C) 5' 2\" (1.575 m) 27.44 kg/m2         Blood Pressure from Last 3 Encounters:   18 115/76   18 130/78   18 117/67    Weight from Last 3 Encounters:   18 150 lb (68 kg)   18 152 lb (68.9 kg)   16 138 lb (62.6 kg)              Today, you had the following     No orders found for display       Primary Care Provider Office Phone # Fax #    Nicole Rojas [phone] Replaced by [facility] [phone]        Equal Access to Services     XOCHITL DALE AH: Hadii aquilino Rendon, marci donato, qarosaura ham. So [facility] [phone].    ATENCIÓN: Si habla español, tiene a rodrigues disposición servicios gratuitos de asistencia lingüística. Llame al [phone].    We comply with applicable federal civil rights laws and [place] laws. We do not discriminate on the basis of race, " color, national origin, age, disability, sex, sexual orientation, or gender identity.            Thank you!     Thank you for [facility]  for your care. Our goal is always to provide you with excellent care. Hearing back from our patients is one way we can continue to improve our services. Please take a few minutes to complete the written survey that you may receive in the mail after your visit with us. Thank you!             Your Updated Medication List - Protect others around you: Learn how to safely use, store and throw away your medicines at www.disposemymeds.org.          This list is accurate as of 4/5/18 11:59 PM.  Always use your most recent med list.                   Brand Name Dispense Instructions for use Diagnosis    ACETAMINOPHEN PO      Take 325 mg by mouth as needed for pain (4-6 hours)        ALL DAY ALLERGY 10 MG tablet   Generic drug:  cetirizine     30 tablet    Take one (1) tablet BY MOUTH daily    Hives       blood glucose monitoring test strip    EASY TOUCH TEST    50 strip    TEST 1 TIMES BY FINGER POKE ROUTE EVERY DAY [facility] -[facility]-.00-E11.9    Diabetes mellitus, type 2 (H)       chlorhexidine 0.12 % solution    PERIDEX    473 mL    RINSE WITH 15 MLS ORALLY TWICE DAILY    Healthcare maintenance       * EASY TOUCH LANCETS 32G/TWIST Misc     100 each    TEST 1 TIMES BY FINGER POKE ROUTE EVERY DAY [facility] -BE E11.9    Diabetes mellitus, type 2 (H)       * EASY TOUCH LANCETS 32G/TWIST Misc     100 each    1 Device by Lancet route daily Use to check blood sugar as directed by your provider.    Diabetes mellitus, type 2 (H)       metFORMIN 1000 MG tablet    GLUCOPHAGE    60 tablet    Take one (1) tablet BY MOUTH twice daily    Type 2 diabetes mellitus without complication, without long-term current use of insulin (H)       order for DME     1 Device    Equipment being ordered: Wheeled walker with seat  Patient has frequent falls, and need to rest often (needs seat)     Unspecified intellectual disabilities, Arthritis       * order for DME     1 Device    Equipment being ordered: Diabetic shoes dx: DM type 2, controlled, last visit 7/24/15, poor circulation as comorbidity    Type 2 diabetes mellitus without complication, without long-term current use of insulin (H)       * order for DME     1 Device    Equipment being ordered: order for DME Sig: Equipment being ordered: Diabetic shoes dx: DM type 2, controlled, last visit 2.18.18 , poor circulation as comorbidity    Type 2 diabetes mellitus with complication, without long-term current use of insulin (H)       * order for DME     5 each    Equipment being ordered:   1.  Silver foam (4x4 in) Apply to affected areas Disp: 5 Refill: 3  2.  Medipore tape (4 inch) Apply to affected area Disp: 5 Refill: 3    Pressure ulcer of left foot, stage 2       ramipril 5 MG capsule    ALTACE    30 capsule    Take one (1) capsule BY MOUTH daily    HTN (hypertension)       ranitidine 300 MG tablet    ZANTAC    30 tablet    Take one (1) tablet BY MOUTH daily    Hives       simvastatin 10 MG tablet    ZOCOR    30 tablet    Take one (1) tablet BY MOUTH daily    Mixed hyperlipidemia       TAB-A-TIGRE Tabs     30 tablet    TAKE 1 TABLET DAILY BY MOUTH TAKE WITH FOOD    Health care maintenance       triamcinolone 0.1 % cream    KENALOG    80 g    Apply to affected area(s) TOPICALLY twice daily    Contact dermatitis due to poison ivy       * Notice:  This list has 5 medication(s) that are the same as other medications prescribed for you. Read the directions carefully, and ask your doctor or other care provider to review them with you.

## 2018-04-05 NOTE — PROGRESS NOTES
"SUBJECTIVE:  Aki Navarro, 62 year old, male presents with the following Chief Complaint(s) with HPI to follow:  Chief Complaint   Patient presents with     WOUND CARE     left foot wound      Wound Care    HPI:  Cortez is here today with an assistant from [facility] for the reassessment and treatment of left foot wound.  Back story:  Patient doesn't communicate (or chooses not to communicate)  Cortez's wound started about 1-2 months ago.    They feel that his DM/orthotic shoes might have caused it as he has a buckle/velcro system.    He wears his shoes \"all the time\" (except for sleeping)    They are in the process of getting him new shoes--should be getting them tomorrow.    Cortez was seen in the ED/UC on 3/4/18 for this wound.    He was prescribed Bactrim D, 1 tablet BID--therapy completed.    Past treatment per ED: mupirocin topically 2 times daily.    1st wound care appointment: 3/8/18 with myself  Referral from ED: RENU Vasquez   Wound care plan was changed to silver foam, changed daily  No issues with dressing changes.     There were concerns address last visit (3/16/18) with Shaunna DUVALL RN CWOCN that Cortez has a tendency to \"pick\" and thinks that these wounds may be self-inflicted.  Denies any fevers, chills, and/or malodorous drainage.   PMH: history of MR, diabetes mellitus, type 2. Arely wasn't sure when he was diagnosed but knows that he has had for over 7+ years.    Lab Results   Component Value Date    A1C 6.9 02/08/2018    A1C 6.6 12/22/2016    A1C 6.2 09/13/2016    A1C 6.1 11/12/2015    A1C 6.2 03/26/2015     No new concerns today.     Patient Active Problem List   Diagnosis     Advanced care planning/counseling discussion     Diabetes mellitus, type 2 (H)     Essential hypertension, benign     Hyperlipidemia     Mental retardation     Arthritis       Past Medical History:   Diagnosis Date     Allergic rhinitis, cause unspecified 1/11/2011     Arthritis 9/24/2013     Essential hypertension, benign " 1/11/2011     Impacted cerumen of both ears 10/12/2012     Other and unspecified hyperlipidemia 1/11/2011     Type II or unspecified type diabetes mellitus without mention of complication 4/28/2005     Unspecified intellectual disabilities 1/11/2011       Past Surgical History:   Procedure Laterality Date     MOUTH SURGERY  2008     undescended testicle surgery         Family History   Problem Relation Age of Onset     CANCER Mother      brain cancer - cause of death     CANCER Father 76     renal cancer - cause of death     DIABETES Mother 60     DIABETES Sister 50       Social History   Substance Use Topics     Smoking status: Never Smoker     Smokeless tobacco: Never Used     Alcohol use Yes      Comment: 1 beer occasionally       Current Outpatient Prescriptions   Medication Sig Dispense Refill     metFORMIN (GLUCOPHAGE) 1000 MG tablet Take one (1) tablet BY MOUTH twice daily 60 tablet 2     triamcinolone (KENALOG) 0.1 % cream Apply to affected area(s) TOPICALLY twice daily 80 g 1     order for DME Equipment being ordered: order for DME   Sig: Equipment being ordered: Diabetic shoes dx: DM type 2, controlled, last visit 2.18.18 , poor circulation as comorbidity 1 Device 0     order for DME Equipment being ordered:     1.  Silver foam (4x4 in)  Apply to affected areas  Disp: 5  Refill: 3    2.  Medipore tape (4 inch)  Apply to affected area  Disp: 5  Refill: 3 5 each 3     ramipril (ALTACE) 5 MG capsule Take one (1) capsule BY MOUTH daily 30 capsule 11     ranitidine (ZANTAC) 300 MG tablet Take one (1) tablet BY MOUTH daily 30 tablet 5     EASY TOUCH LANCETS 32G/TWIST MISC 1 Device by Lancet route daily Use to check blood sugar as directed by your provider. 100 each 5     simvastatin (ZOCOR) 10 MG tablet Take one (1) tablet BY MOUTH daily 30 tablet 5     order for DME Equipment being ordered: Diabetic shoes dx: DM type 2, controlled, last visit 7/24/15, poor circulation as comorbidity 1 Device 0     chlorhexidine  "(PERIDEX) 0.12 % solution RINSE WITH 15 MLS ORALLY TWICE DAILY 473 mL prn     ALL DAY ALLERGY 10 MG tablet Take one (1) tablet BY MOUTH daily 30 tablet 3     blood glucose monitoring (EASY TOUCH TEST) test strip TEST 1 TIMES BY FINGER POKE ROUTE EVERY DAY [facility] -QT-.00-E11.9 50 strip 5     Multiple Vitamin (TAB-A-TIGRE) TABS TAKE 1 TABLET DAILY BY MOUTH TAKE WITH FOOD 30 tablet 0     EASY TOUCH LANCETS 32G/TWIST MISC TEST 1 TIMES BY FINGER POKE ROUTE EVERY DAY [facility] -PX E11.9 100 each 2     ACETAMINOPHEN PO Take 325 mg by mouth as needed for pain (4-6 hours)        ORDER FOR DME Equipment being ordered: Wheeled walker with seat    Patient has frequent falls, and need to rest often (needs seat) 1 Device 0       Allergies   Allergen Reactions     Tape [Adhesive Tape]        REVIEW OF SYSTEMS  Skin: as noted above  Eyes: negative  Ears/Nose/Throat: negative  Respiratory: No shortness of breath, dyspnea on exertion, cough, or hemoptysis  Cardiovascular: negative  Gastrointestinal: negative  Genitourinary: negative  Musculoskeletal: uses a walker to walk  Neurologic: negative  Psychiatric: history of MR  Hematologic/Lymphatic/Immunologic: negative  Endocrine: positive for diabetes    OBJECTIVE:  /76  Pulse 82  Temp 97.8  F (36.6  C)  Ht 5' 2\" (1.575 m)  Wt 150 lb (68 kg)  BMI 27.44 kg/m2  Constitutional: healthy, alert and no distress  Cardiovascular: RRR. No murmurs, clicks gallops or rub  Respiratory:  Good diaphragmatic excursion. Lungs clear  Skin:   Wound description: Type of Wound- pressure ulcer, stage 2; Location- left foot, medial foot, Drainage amount- scant, Drainage color- serosanguinous, Odor- none; Wound bed- 100% pink, Surrounding skin-slight erythema, no increased heat, no lymphangitis.  Measurements: 1.3 x 1 cm  Dressing change: Wound cleansed with soap and water, dried, dressed with silver foam and tape.  Skin barrier prep used prior to tape placement    Wound description: Type of " Wound- pressure ulcer, unstageable; Location- left foot, dorsal foot, Drainage amount-scant, Drainage color- serosanguinous, Odor- none; Wound bed-100% pink, Surrounding skin: slight erythema, no increased heat, no lymphangitis.  Measurements: 0.5 x 0.3  Noted 5 pinpoint areas  Dressing change: Wound cleansed with soap and water, dried, dressed with silver foam + medipore tape (skin barrier prep used prior to tape placement).      Psychiatric: mentation appears normal and affect normal/bright      LABS  Results for orders placed or performed in visit on 03/07/18   ABSTRACT COLOGUARD-NO CHARGE   Result Value Ref Range    COLOGUARD-ABSTRACT Negative     Narrative    COLORECTAL CANCER SCREENING RESULTS EXACT SCIENCES       ASSESSMENT / PLAN:  (L89.892) Pressure ulcer of left foot, stage 2  (primary encounter diagnosis)  Comment: dimensions are improving  Plan: continue with silver foam    Wear new DM shoes once he gets them.            Treatment goal: moisture balance and prevent infection    Patient Instructions   Wash hands prior to dressing change.   Clean instruments as appropriate    Wash wound with mild soap and water, dry (don't soak foot)  Wipe surround skin (where tape will be placed) with skin barrier prep pad  Apply silver foam (remove plastic backing, stick side down on the wound)--cut to fit to both wounds   Secure with medipore tape  Change every day or as needed    Please call if any questions/concerns and/or problems ([phone])    Follow up as scheduled        Time: 35 minutes  Barrier: see PMH  Willingness to learn: somewhat accepting    Samira CASTELLON FNP-BC  Disease Management    Cc: Dr. Nath

## 2018-04-19 ENCOUNTER — OFFICE VISIT (OUTPATIENT)
Dept: WOUND CARE | Facility: OTHER | Age: 63
End: 2018-04-19
Attending: NURSE PRACTITIONER
Payer: MEDICARE

## 2018-04-19 VITALS
DIASTOLIC BLOOD PRESSURE: 68 MMHG | BODY MASS INDEX: 27.79 KG/M2 | WEIGHT: 151 LBS | SYSTOLIC BLOOD PRESSURE: 110 MMHG | HEART RATE: 83 BPM | TEMPERATURE: 98.5 F | HEIGHT: 62 IN

## 2018-04-19 DIAGNOSIS — L89.892 PRESSURE ULCER OF LEFT FOOT, STAGE 2 (H): Primary | ICD-10-CM

## 2018-04-19 PROCEDURE — G0463 HOSPITAL OUTPT CLINIC VISIT: HCPCS

## 2018-04-19 PROCEDURE — 99213 OFFICE O/P EST LOW 20 MIN: CPT | Performed by: NURSE PRACTITIONER

## 2018-04-19 ASSESSMENT — PAIN SCALES - GENERAL: PAINLEVEL: NO PAIN (0)

## 2018-04-19 NOTE — MR AVS SNAPSHOT
"              After Visit Summary   4/19/2018    Aki DUVALL Sopmarci    MRN: [id number]           Patient Information     Date Of Birth          1955        Visit Information        Provider Department      4/19/2018 11:00 AM Samira Jeffery NP [facility]        Care Instructions    Wash hands prior to dressing change.   Clean instruments as appropriate    Wash wound with mild soap and water, dry (don't soak foot)  Wipe surround skin (where tape will be placed) with skin barrier prep pad  Apply silver foam (remove plastic backing, stick side down on the wound)--cut to fit to both wounds   Secure with medipore tape  Change every day or as needed    Please call if any questions/concerns and/or problems ([phone])    Follow up as scheduled              Follow-ups after your visit        Who to contact     If you have questions or need follow up information about today's clinic visit or your schedule please contact [facility] directly at [phone].  Normal or non-critical lab and imaging results will be communicated to you by [employer], letter or phone within 4 business days after the clinic has received the results. If you do not hear from us within 7 days, please contact the clinic through [employer] or phone. If you have a critical or abnormal lab result, we will notify you by phone as soon as possible.  Submit refill requests through [employer] or call your pharmacy and they will forward the refill request to us. Please allow 3 business days for your refill to be completed.          Additional Information About Your Visit        [employer] Information     [employer] lets you send messages to your doctor, view your test results, renew your prescriptions, schedule appointments and more. To sign up, go to [email or web address] . Click on \"Log in\" on the left side of the screen, which will take you to the Welcome page. Then click on \"Sign up Now\" on the right side of the page.     You will be " "asked to enter the access code listed below, as well as some personal information. Please follow the directions to create your username and password.     Your access code is: [email or web address]  Expires: 2018 12:10 PM     Your access code will  in 90 days. If you need help or a new code, please call your [place] clinic or [phone].        Care EveryWhere ID     This is your Care EveryWhere ID. This could be used by other organizations to access your [place] medical records  [id number]        Your Vitals Were     Pulse Temperature Height BMI (Body Mass Index)          83 98.5  F (36.9  C) 5' 2\" (1.575 m) 27.62 kg/m2         Blood Pressure from Last 3 Encounters:   18 110/68   18 115/76   18 130/78    Weight from Last 3 Encounters:   18 151 lb (68.5 kg)   18 150 lb (68 kg)   18 152 lb (68.9 kg)              Today, you had the following     No orders found for display       Primary Care Provider Office Phone # Fax #    Nicole Rojas [phone] [address] [place] [address]        Equal Access to Services     ZION DALE : Vinh arnoldo Soalejandra, waaxda luqadaha, qaybta kaalmada adeegyada, rosaura farrar. So [facility] [phone].    ATENCIÓN: Si habla español, tiene a rodrigues disposición servicios gratuitos de asistencia lingüística. Llame al [phone].    We comply with applicable federal civil rights laws and [place] laws. We do not discriminate on the basis of race, color, national origin, age, disability, sex, sexual orientation, or gender identity.            Thank you!     Thank you for choosing [facility]  for your care. Our goal is always to provide you with excellent care. Hearing back from our patients is one way we can continue to improve our services. Please take a few minutes to complete the written survey that you may receive in the mail after your visit with us. " Thank you!             Your Updated Medication List - Protect others around you: Learn how to safely use, store and throw away your medicines at www.disposemymeds.org.          This list is accurate as of 4/19/18 11:36 AM.  Always use your most recent med list.                   Brand Name Dispense Instructions for use Diagnosis    ACETAMINOPHEN PO      Take 325 mg by mouth as needed for pain (4-6 hours)        ALL DAY ALLERGY 10 MG tablet   Generic drug:  cetirizine     30 tablet    Take one (1) tablet BY MOUTH daily    Hives       blood glucose monitoring test strip    EASY TOUCH TEST    50 strip    TEST 1 TIMES BY FINGER POKE ROUTE EVERY DAY [facility] -[place]-.00-E11.9    Diabetes mellitus, type 2 (H)       chlorhexidine 0.12 % solution    PERIDEX    473 mL    RINSE WITH 15 MLS ORALLY TWICE DAILY    Healthcare maintenance       * EASY TOUCH LANCETS 32G/TWIST Misc     100 each    TEST 1 TIMES BY FINGER POKE ROUTE EVERY DAY [facility] -SX E11.9    Diabetes mellitus, type 2 (H)       * EASY TOUCH LANCETS 32G/TWIST Misc     100 each    1 Device by Lancet route daily Use to check blood sugar as directed by your provider.    Diabetes mellitus, type 2 (H)       metFORMIN 1000 MG tablet    GLUCOPHAGE    60 tablet    Take one (1) tablet BY MOUTH twice daily    Type 2 diabetes mellitus without complication, without long-term current use of insulin (H)       order for DME     1 Device    Equipment being ordered: Wheeled walker with seat  Patient has frequent falls, and need to rest often (needs seat)    Unspecified intellectual disabilities, Arthritis       * order for DME     1 Device    Equipment being ordered: Diabetic shoes dx: DM type 2, controlled, last visit 7/24/15, poor circulation as comorbidity    Type 2 diabetes mellitus without complication, without long-term current use of insulin (H)       * order for DME     1 Device    Equipment being ordered: order for DME Sig: Equipment being ordered: Diabetic shoes dx: DM  type 2, controlled, last visit 2.18.18 , poor circulation as comorbidity    Type 2 diabetes mellitus with complication, without long-term current use of insulin (H)       * order for DME     5 each    Equipment being ordered:   1.  Silver foam (4x4 in) Apply to affected areas Disp: 5 Refill: 3  2.  Medipore tape (4 inch) Apply to affected area Disp: 5 Refill: 3    Pressure ulcer of left foot, stage 2       ramipril 5 MG capsule    ALTACE    30 capsule    Take one (1) capsule BY MOUTH daily    HTN (hypertension)       ranitidine 300 MG tablet    ZANTAC    30 tablet    Take one (1) tablet BY MOUTH daily    Hives       simvastatin 10 MG tablet    ZOCOR    30 tablet    Take one (1) tablet BY MOUTH daily    Mixed hyperlipidemia       TAB-A-TIGRE Tabs     30 tablet    TAKE 1 TABLET DAILY BY MOUTH TAKE WITH FOOD    Health care maintenance       triamcinolone 0.1 % cream    KENALOG    80 g    Apply to affected area(s) TOPICALLY twice daily    Contact dermatitis due to poison ivy       * Notice:  This list has 5 medication(s) that are the same as other medications prescribed for you. Read the directions carefully, and ask your doctor or other care provider to review them with you.

## 2018-04-19 NOTE — PROGRESS NOTES
"SUBJECTIVE:  Aki Navarro, 62 year old, male presents with the following Chief Complaint(s) with HPI to follow:  Chief Complaint   Patient presents with     WOUND CARE     left foot      Wound Care    HPI:  Cortez is here today with an assistant (Claudia) from [facility] for the reassessment and treatment of left foot wound.  Back story:  Patient doesn't communicate (or chooses not to communicate)  Cortez's wound started about 2 months ago.    His wound might have started from his DM/orthotic shoes.  It's a buckle/velcro system and he tends to place the straps tight.    He wears his shoes \"all the time\" (except for sleeping)    He did get his new shoes recently and has them on today.    Cortez was seen in the ED/UC on 3/4/18 for this wound.    He was prescribed Bactrim D, 1 tablet BID--therapy completed.    Past treatment per ED: mupirocin topically 2 times daily.    1st wound care appointment: 3/8/18 with myself  Referral from ED: RENU Vasquez   Wound care plan was changed to silver foam, changed every 1-3 days  No issues with dressing changes.     There were concerns on 3/16/18's appointment with Shaunna DUVALL RN CWOCN that Cortez has a tendency to \"pick\" a that area--questioning if these wounds may be self-inflicted.  Denies any fevers, chills, and/or malodorous drainage.   PMH: history of MR, diabetes mellitus, type 2. Arely wasn't sure when he was diagnosed but knows that he has had for over 7+ years.    Lab Results   Component Value Date    A1C 6.9 02/08/2018    A1C 6.6 12/22/2016    A1C 6.2 09/13/2016    A1C 6.1 11/12/2015    A1C 6.2 03/26/2015     Claudia denies any new health history or concerns today.     Patient Active Problem List   Diagnosis     Advanced care planning/counseling discussion     Diabetes mellitus, type 2 (H)     Essential hypertension, benign     Hyperlipidemia     Mental retardation     Arthritis       Past Medical History:   Diagnosis Date     Allergic rhinitis, cause unspecified 1/11/2011     " Arthritis 9/24/2013     Essential hypertension, benign 1/11/2011     Impacted cerumen of both ears 10/12/2012     Other and unspecified hyperlipidemia 1/11/2011     Type II or unspecified type diabetes mellitus without mention of complication 4/28/2005     Unspecified intellectual disabilities 1/11/2011       Past Surgical History:   Procedure Laterality Date     MOUTH SURGERY  2008     undescended testicle surgery         Family History   Problem Relation Age of Onset     CANCER Mother      brain cancer - cause of death     CANCER Father 76     renal cancer - cause of death     DIABETES Mother 60     DIABETES Sister 50       Social History   Substance Use Topics     Smoking status: Never Smoker     Smokeless tobacco: Never Used     Alcohol use Yes      Comment: 1 beer occasionally       Current Outpatient Prescriptions   Medication Sig Dispense Refill     ACETAMINOPHEN PO Take 325 mg by mouth as needed for pain (4-6 hours)        ALL DAY ALLERGY 10 MG tablet Take one (1) tablet BY MOUTH daily 30 tablet 3     blood glucose monitoring (EASY TOUCH TEST) test strip TEST 1 TIMES BY FINGER POKE ROUTE EVERY DAY [facility] -JU-.00-E11.9 50 strip 5     chlorhexidine (PERIDEX) 0.12 % solution RINSE WITH 15 MLS ORALLY TWICE DAILY 473 mL prn     EASY TOUCH LANCETS 32G/TWIST MISC TEST 1 TIMES BY FINGER POKE ROUTE EVERY DAY [facility] -UG E11.9 100 each 2     EASY TOUCH LANCETS 32G/TWIST MISC 1 Device by Lancet route daily Use to check blood sugar as directed by your provider. 100 each 5     metFORMIN (GLUCOPHAGE) 1000 MG tablet Take one (1) tablet BY MOUTH twice daily 60 tablet 2     Multiple Vitamin (TAB-A-TIGRE) TABS TAKE 1 TABLET DAILY BY MOUTH TAKE WITH FOOD 30 tablet 0     ORDER FOR DME Equipment being ordered: Wheeled walker with seat    Patient has frequent falls, and need to rest often (needs seat) 1 Device 0     order for DME Equipment being ordered: Diabetic shoes dx: DM type 2, controlled, last visit 7/24/15, poor  "circulation as comorbidity 1 Device 0     order for DME Equipment being ordered: order for DME   Sig: Equipment being ordered: Diabetic shoes dx: DM type 2, controlled, last visit 2.18.18 , poor circulation as comorbidity 1 Device 0     order for DME Equipment being ordered:     1.  Silver foam (4x4 in)  Apply to affected areas  Disp: 5  Refill: 3    2.  Medipore tape (4 inch)  Apply to affected area  Disp: 5  Refill: 3 5 each 3     ramipril (ALTACE) 5 MG capsule Take one (1) capsule BY MOUTH daily 30 capsule 11     ranitidine (ZANTAC) 300 MG tablet Take one (1) tablet BY MOUTH daily 30 tablet 5     simvastatin (ZOCOR) 10 MG tablet Take one (1) tablet BY MOUTH daily 30 tablet 5     triamcinolone (KENALOG) 0.1 % cream Apply to affected area(s) TOPICALLY twice daily 80 g 1       Allergies   Allergen Reactions     Tape [Adhesive Tape]        REVIEW OF SYSTEMS  Skin: as noted above  Eyes: negative  Ears/Nose/Throat: negative  Respiratory: No shortness of breath, dyspnea on exertion, cough, or hemoptysis  Cardiovascular: negative  Gastrointestinal: negative  Genitourinary: negative  Musculoskeletal: uses a walker to walk  Neurologic: negative  Psychiatric: history of MR  Hematologic/Lymphatic/Immunologic: negative  Endocrine: positive for diabetes    OBJECTIVE:  /68  Pulse 83  Temp 98.5  F (36.9  C)  Ht 5' 2\" (1.575 m)  Wt 151 lb (68.5 kg)  BMI 27.62 kg/m2  Constitutional: healthy, alert and no distress  Cardiovascular: RRR. No murmurs, clicks gallops or rub  Respiratory:  Good diaphragmatic excursion. Lungs clear  Skin:   Wound description: Type of Wound- pressure ulcer, stage 2; Location- left foot, medial foot, Drainage amount- scant, Drainage color- serosanguinous, Odor- none; Wound bed- 100% pink, Surrounding skin- small area of ecchymosis; no erythema, increased heat, or lymphangitis.  Measurements: 2 pinpoint openings  Dressing change: Wound cleansed with soap and water, dried, dressed with silver foam " and tape.  Skin barrier prep used prior to tape placement    Wound description: Type of Wound- pressure ulcer, unstageable; Location- left foot, dorsal foot, Drainage amount-scant, Drainage color- serosanguinous, Odor- none; Wound bed-100% pink, Surrounding skin: slight erythema, no increased heat, no lymphangitis.  Measurements: 0.4 x 0.2  Dressing change: Wound cleansed with soap and water, dried, dressed with silver foam + medipore tape (skin barrier prep used prior to tape placement).      Psychiatric: mentation appears normal and affect normal/bright      LABS  Results for orders placed or performed in visit on 03/07/18   ABSTRACT COLOGUARD-NO CHARGE   Result Value Ref Range    COLOGUARD-ABSTRACT Negative     Narrative    COLORECTAL CANCER SCREENING RESULTS EX[employer]ES       ASSESSMENT / PLAN:  (L89.892) Pressure ulcer of left foot, stage 2  (primary encounter diagnosis)  Comment: wound dimensions continue to improve  Plan: continue with silver foam    Wear new DM shoes as discussed.  Watch ecchymotic area and how tight Cortez applies his straps          Treatment goal: moisture balance and prevent infection    Patient Instructions   Wash hands prior to dressing change.   Clean instruments as appropriate    Wash wound with mild soap and water, dry (don't soak foot)  Wipe surround skin (where tape will be placed) with skin barrier prep pad  Apply silver foam (remove plastic backing, stick side down on the wound)--cut to fit to both wounds   Secure with medipore tape  Change every day or as needed    Please call if any questions/concerns and/or problems ([phone])    Follow up as scheduled        Time: 30 minutes  Barrier: see PMH  Willingness to learn: somewhat accepting    Samira CASTELLON FNP-BC  Disease Management    Cc: Dr. Nath

## 2018-04-19 NOTE — PATIENT INSTRUCTIONS
Wash hands prior to dressing change.   Clean instruments as appropriate    Wash wound with mild soap and water, dry (don't soak foot)  Wipe surround skin (where tape will be placed) with skin barrier prep pad  Apply silver foam (remove plastic backing, stick side down on the wound)--cut to fit to both wounds   Secure with medipore tape  Change every day or as needed    Please call if any questions/concerns and/or problems ([phone])    Follow up as scheduled

## 2018-04-24 DIAGNOSIS — L50.9 HIVES: ICD-10-CM

## 2018-04-24 DIAGNOSIS — L23.7 CONTACT DERMATITIS DUE TO POISON IVY: ICD-10-CM

## 2018-04-25 RX ORDER — TRIAMCINOLONE ACETONIDE 1 MG/G
CREAM TOPICAL
Qty: 80 G | Refills: 1 | Status: SHIPPED | OUTPATIENT
Start: 2018-04-25 | End: 2018-06-19

## 2018-04-25 RX ORDER — CETIRIZINE HYDROCHLORIDE 10 MG/1
TABLET, FILM COATED ORAL
Qty: 30 TABLET | Refills: 3 | Status: SHIPPED | OUTPATIENT
Start: 2018-04-25 | End: 2018-08-21

## 2018-04-25 NOTE — TELEPHONE ENCOUNTER
Kenalog       Last Written Prescription Date:  3/14/2018  Last Fill Quantity: 80g,   # refills: 1  Last Office Visit: 2/08/2018  Future Office visit:         Cetrizine       Last Written Prescription Date:  1/25/2018  Last Fill Quantity: 30,   # refills: 3  Last Office Visit: 2/08/2018  Future Office visit:

## 2018-05-15 DIAGNOSIS — Z00.00 HEALTHCARE MAINTENANCE: Primary | ICD-10-CM

## 2018-05-16 RX ORDER — MULTIVITAMIN/IRON/FOLIC ACID 18MG-0.4MG
TABLET ORAL
Qty: 130 TABLET | Refills: 0 | Status: SHIPPED | OUTPATIENT
Start: 2018-05-16 | End: 2018-10-02

## 2018-05-24 ENCOUNTER — OFFICE VISIT (OUTPATIENT)
Dept: FAMILY MEDICINE | Facility: OTHER | Age: 63
End: 2018-05-24
Attending: FAMILY MEDICINE
Payer: MEDICARE

## 2018-05-24 VITALS
RESPIRATION RATE: 16 BRPM | BODY MASS INDEX: 27.79 KG/M2 | OXYGEN SATURATION: 98 % | HEART RATE: 80 BPM | WEIGHT: 151 LBS | DIASTOLIC BLOOD PRESSURE: 68 MMHG | HEIGHT: 62 IN | SYSTOLIC BLOOD PRESSURE: 108 MMHG | TEMPERATURE: 97.8 F

## 2018-05-24 DIAGNOSIS — E78.5 HYPERLIPIDEMIA, UNSPECIFIED HYPERLIPIDEMIA TYPE: ICD-10-CM

## 2018-05-24 DIAGNOSIS — I10 ESSENTIAL HYPERTENSION, BENIGN: ICD-10-CM

## 2018-05-24 DIAGNOSIS — E11.9 TYPE 2 DIABETES MELLITUS WITHOUT COMPLICATION, WITHOUT LONG-TERM CURRENT USE OF INSULIN (H): Primary | ICD-10-CM

## 2018-05-24 LAB
ALT SERPL W P-5'-P-CCNC: 33 U/L (ref 0–70)
ANION GAP SERPL CALCULATED.3IONS-SCNC: 10 MMOL/L (ref 3–14)
BUN SERPL-MCNC: 24 MG/DL (ref 7–30)
CALCIUM SERPL-MCNC: 8.6 MG/DL (ref 8.5–10.1)
CHLORIDE SERPL-SCNC: 104 MMOL/L (ref 94–109)
CHOLEST SERPL-MCNC: 126 MG/DL
CO2 SERPL-SCNC: 25 MMOL/L (ref 20–32)
CREAT SERPL-MCNC: 0.78 MG/DL (ref 0.66–1.25)
EST. AVERAGE GLUCOSE BLD GHB EST-MCNC: 163 MG/DL
GFR SERPL CREATININE-BSD FRML MDRD: >90 ML/MIN/1.7M2
GLUCOSE SERPL-MCNC: 130 MG/DL (ref 70–99)
HBA1C MFR BLD: 7.3 % (ref 0–5.6)
HDLC SERPL-MCNC: 48 MG/DL
LDLC SERPL CALC-MCNC: 51 MG/DL
NONHDLC SERPL-MCNC: 78 MG/DL
POTASSIUM SERPL-SCNC: 4.4 MMOL/L (ref 3.4–5.3)
SODIUM SERPL-SCNC: 139 MMOL/L (ref 133–144)
TRIGL SERPL-MCNC: 136 MG/DL

## 2018-05-24 PROCEDURE — G0463 HOSPITAL OUTPT CLINIC VISIT: HCPCS

## 2018-05-24 PROCEDURE — 80048 BASIC METABOLIC PNL TOTAL CA: CPT | Mod: ZL | Performed by: FAMILY MEDICINE

## 2018-05-24 PROCEDURE — 84460 ALANINE AMINO (ALT) (SGPT): CPT | Mod: ZL | Performed by: FAMILY MEDICINE

## 2018-05-24 PROCEDURE — 99214 OFFICE O/P EST MOD 30 MIN: CPT | Performed by: FAMILY MEDICINE

## 2018-05-24 PROCEDURE — 36415 COLL VENOUS BLD VENIPUNCTURE: CPT | Mod: ZL | Performed by: FAMILY MEDICINE

## 2018-05-24 PROCEDURE — 80061 LIPID PANEL: CPT | Mod: ZL | Performed by: FAMILY MEDICINE

## 2018-05-24 PROCEDURE — 83036 HEMOGLOBIN GLYCOSYLATED A1C: CPT | Mod: ZL | Performed by: FAMILY MEDICINE

## 2018-05-24 PROCEDURE — 40000788 ZZHCL STATISTIC ESTIMATED AVERAGE GLUCOSE: Mod: ZL | Performed by: FAMILY MEDICINE

## 2018-05-24 ASSESSMENT — PAIN SCALES - GENERAL: PAINLEVEL: NO PAIN (0)

## 2018-05-24 NOTE — NURSING NOTE
"Chief Complaint   Patient presents with     Diabetes       Initial /68 (BP Location: Left arm, Patient Position: Sitting, Cuff Size: Adult Large)  Pulse 80  Temp 97.8  F (36.6  C) (Tympanic)  Resp 16  Ht 5' 2\" (1.575 m)  Wt 151 lb (68.5 kg)  SpO2 98%  BMI 27.62 kg/m2 Estimated body mass index is 27.62 kg/(m^2) as calculated from the following:    Height as of this encounter: 5' 2\" (1.575 m).    Weight as of this encounter: 151 lb (68.5 kg).  Medication Reconciliation: complete    Katelynn Jesus LPN    "

## 2018-05-24 NOTE — PROGRESS NOTES
SUBJECTIVE:   Aki Navarro is a 62 year old male who presents to clinic today for the following health issues:      Diabetes Follow-up    Patient is checking blood sugars: once daily.  Results are as follows:         am - 130s    Diabetic concerns: None     Symptoms of hypoglycemia (low blood sugar): none     Paresthesias (numbness or burning in feet) or sores: No     Date of last diabetic eye exam: 4 months ago    BP Readings from Last 2 Encounters:   05/24/18 108/68   04/19/18 110/68     Hemoglobin A1C (%)   Date Value   02/08/2018 6.9 (H)   12/22/2016 6.6 (H)     LDL Cholesterol Calculated (mg/dL)   Date Value   02/08/2018 13   12/22/2016 36     Hyperlipidemia Follow-Up      Rate your low fat/cholesterol diet?: good    Taking statin?  Yes, no muscle aches from statin    Other lipid medications/supplements?:  none      Amount of exercise or physical activity: 6-7 days/week for an average of moving all the time    Problems taking medications regularly: No    Medication side effects: none    Diet: low salt and low fat/cholesterol        Hypertension Follow-up      Outpatient blood pressures are not being checked.    Low Salt Diet: no added salt      Problem list and histories reviewed & adjusted, as indicated.  Additional history: as documented    Patient Active Problem List   Diagnosis     Advanced care planning/counseling discussion     Diabetes mellitus, type 2 (H)     Essential hypertension, benign     Hyperlipidemia     Mental retardation     Arthritis     Past Surgical History:   Procedure Laterality Date     MOUTH SURGERY  2008     undescended testicle surgery         Social History   Substance Use Topics     Smoking status: Never Smoker     Smokeless tobacco: Never Used     Alcohol use Yes      Comment: 1 beer occasionally     Family History   Problem Relation Age of Onset     CANCER Mother      brain cancer - cause of death     DIABETES Mother 60     CANCER Father 76     renal cancer - cause of death      DIABETES Sister 50         Current Outpatient Prescriptions   Medication Sig Dispense Refill     ACETAMINOPHEN PO Take 325 mg by mouth as needed for pain (4-6 hours)        ALL DAY ALLERGY 10 MG tablet TAKE ONE (1) TABLET BY MOUTH DAILY 30 tablet 3     blood glucose monitoring (EASY TOUCH TEST) test strip TEST 1 TIMES BY FINGER POKE ROUTE EVERY DAY [facility] -PS-.00-E11.9 50 strip 5     chlorhexidine (PERIDEX) 0.12 % solution RINSE WITH 15 MLS ORALLY TWICE DAILY 473 mL prn     EASY TOUCH LANCETS 32G/TWIST MISC 1 Device by Lancet route daily Use to check blood sugar as directed by your provider. 100 each 5     EASY TOUCH LANCETS 32G/TWIST MISC TEST 1 TIMES BY FINGER POKE ROUTE EVERY DAY [facility] -EG E11.9 100 each 2     metFORMIN (GLUCOPHAGE) 1000 MG tablet Take one (1) tablet BY MOUTH twice daily 60 tablet 2     Multiple Vitamins-Minerals (CENTROVITE) TABS TAKE ONE (1) TABLET BY MOUTH DAILY 130 tablet 0     order for DME Equipment being ordered: order for DME   Sig: Equipment being ordered: Diabetic shoes dx: DM type 2, controlled, last visit 2.18.18 , poor circulation as comorbidity (Patient not taking: Reported on 5/24/2018) 1 Device 0     order for DME Equipment being ordered: Diabetic shoes dx: DM type 2, controlled, last visit 7/24/15, poor circulation as comorbidity 1 Device 0     ORDER FOR DME Equipment being ordered: Wheeled walker with seat    Patient has frequent falls, and need to rest often (needs seat) 1 Device 0     ramipril (ALTACE) 5 MG capsule Take one (1) capsule BY MOUTH daily 30 capsule 11     ranitidine (ZANTAC) 300 MG tablet Take one (1) tablet BY MOUTH daily 30 tablet 5     simvastatin (ZOCOR) 10 MG tablet Take one (1) tablet BY MOUTH daily 30 tablet 5     triamcinolone (KENALOG) 0.1 % cream APPLY TO AFFECTED AREA(S) TOPICALLY TWICE DAILY 80 g 1     Allergies   Allergen Reactions     Tape [Adhesive Tape]        Reviewed and updated as needed this visit by clinical staff  Tobacco   "Allergies  Meds  Med Hx  Surg Hx  Fam Hx  Soc Hx      Reviewed and updated as needed this visit by Provider         ROS:  Constitutional, HEENT, cardiovascular, pulmonary, gi and gu systems are negative, except as otherwise noted.    OBJECTIVE:     /68 (BP Location: Left arm, Patient Position: Sitting, Cuff Size: Adult Large)  Pulse 80  Temp 97.8  F (36.6  C) (Tympanic)  Resp 16  Ht 5' 2\" (1.575 m)  Wt 151 lb (68.5 kg)  SpO2 98%  BMI 27.62 kg/m2  Body mass index is 27.62 kg/(m^2).  GENERAL: healthy, alert and no distress  PSYCH: affect normal/bright  Diabetic foot exam: normal DP and PT pulses and mild irritation to top of left foot, patient does see a Podiatrist every three months      ASSESSMENT/PLAN:     Diabetes Type II, A1c Controlled, non-insulin dependent   Associated with the following complications    Renal Complications:  None    Ophthalmologic Complications: None    Neurologic Complications: None    Peripheral Vascular Complications:  None    Other: None   Plan:  Labs:  See orders      Hyperlipidemia; controlled   Plan:  Labs:   See orders    Hypertension; controlled   Associated with the following complications:    Diabetes   Plan:  Labs:   See orders        ICD-10-CM    1. Type 2 diabetes mellitus without complication, without long-term current use of insulin (H) E11.9 C FOOT EXAM  NO CHARGE     Hemoglobin A1c   2. Hyperlipidemia, unspecified hyperlipidemia type E78.5 ALT     Lipid Profile   3. Essential hypertension, benign I10 Basic metabolic panel       FUTURE APPOINTMENTS:       - Follow-up for annual visit or as needed    Nicole Rojas MD  [facility]"

## 2018-05-24 NOTE — MR AVS SNAPSHOT
"              After Visit Summary   5/24/2018    Aki DUVALL Sopmarci    MRN: [id number]           Patient Information     Date Of Birth          1955        Visit Information        Provider Department      5/24/2018 9:45 AM Nicole Rojas MD [facility]        Today's Diagnoses     Type 2 diabetes mellitus without complication, without long-term current use of insulin (H)    -  1    Hyperlipidemia, unspecified hyperlipidemia type        Essential hypertension, benign           Follow-ups after your visit        Follow-up notes from your care team     Return in about 3 months (around 8/24/2018).      Who to contact     If you have questions or need follow up information about today's clinic visit or your schedule please contact [facility] directly at [phone].  Normal or non-critical lab and imaging results will be communicated to you by MyChart, letter or phone within 4 business days after the clinic has received the results. If you do not hear from us within 7 days, please contact the clinic through MyChart or phone. If you have a critical or abnormal lab result, we will notify you by phone as soon as possible.  Submit refill requests through [employer] or call your pharmacy and they will forward the refill request to us. Please allow 3 business days for your refill to be completed.          Additional Information About Your Visit        Care EveryWhere ID     This is your Care EveryWhere ID. This could be used by other organizations to access your [place] medical records  [id number]        Your Vitals Were     Pulse Temperature Respirations Height Pulse Oximetry BMI (Body Mass Index)    80 97.8  F (36.6  C) (Tympanic) 16 5' 2\" (1.575 m) 98% 27.62 kg/m2       Blood Pressure from Last 3 Encounters:   05/24/18 108/68   04/19/18 110/68   04/05/18 115/76    Weight from Last 3 Encounters:   05/24/18 151 lb (68.5 kg)   04/19/18 151 lb (68.5 kg)   04/05/18 150 lb (68 kg)            "   We Performed the Following     ALT     Basic metabolic panel     C FOOT EXAM  NO CHARGE     Hemoglobin A1c     Lipid Profile          Today's Medication Changes          These changes are accurate as of 5/24/18  1:44 PM.  If you have any questions, ask your nurse or doctor.               These medicines have changed or have updated prescriptions.        Dose/Directions    * order for DME   This may have changed:  Another medication with the same name was removed. Continue taking this medication, and follow the directions you see here.   Used for:  Type 2 diabetes mellitus without complication, without long-term current use of insulin (H)   Changed by:  Nicole Rojas MD        Equipment being ordered: Diabetic shoes dx: DM type 2, controlled, last visit 7/24/15, poor circulation as comorbidity   Quantity:  1 Device   Refills:  0       * order for DME   This may have changed:  Another medication with the same name was removed. Continue taking this medication, and follow the directions you see here.   Used for:  Type 2 diabetes mellitus with complication, without long-term current use of insulin (H)   Changed by:  Nicole Rojas MD        Equipment being ordered: order for DME Sig: Equipment being ordered: Diabetic shoes dx: DM type 2, controlled, last visit 2.18.18 , poor circulation as comorbidity   Quantity:  1 Device   Refills:  0       * Notice:  This list has 2 medication(s) that are the same as other medications prescribed for you. Read the directions carefully, and ask your doctor or other care provider to review them with you.      Stop taking these medicines if you haven't already. Please contact your care team if you have questions.     TAB-A-TIGRE Tabs   Stopped by:  Nicole Rojas MD                    Primary Care Provider Office Phone # Fax #    Nicole Rojas [phone] [address] [facility] [address]        Equal Access to Services      [facility]: Hadii aad ku myron Sorashidali, waaxda luqadaha, qaybta kaalmada adeegpierre, rosaura justine felixjero loabdoulayelenka blas . So [facility] [phone].    ATENCIÓN: Si habla lul, tiene a rodrigues disposición servicios gratuitos de asistencia lingüística. Llame al [phone].    We comply with applicable federal civil rights laws and [place] laws. We do not discriminate on the basis of race, color, national origin, age, disability, sex, sexual orientation, or gender identity.            Thank you!     Thank you for choosing [facility]  for your care. Our goal is always to provide you with excellent care. Hearing back from our patients is one way we can continue to improve our services. Please take a few minutes to complete the written survey that you may receive in the mail after your visit with us. Thank you!             Your Updated Medication List - Protect others around you: Learn how to safely use, store and throw away your medicines at www.disposemymeds.org.          This list is accurate as of 5/24/18  1:44 PM.  Always use your most recent med list.                   Brand Name Dispense Instructions for use Diagnosis    ACETAMINOPHEN PO      Take 325 mg by mouth as needed for pain (4-6 hours)        ALL DAY ALLERGY 10 MG tablet   Generic drug:  cetirizine     30 tablet    TAKE ONE (1) TABLET BY MOUTH DAILY    Hives       blood glucose monitoring test strip    EASY TOUCH TEST    50 strip    TEST 1 TIMES BY FINGER POKE ROUTE EVERY DAY [facility] -TV-.00-E11.9    Diabetes mellitus, type 2 (H)       CENTROVITE Tabs     130 tablet    TAKE ONE (1) TABLET BY MOUTH DAILY    Healthcare maintenance       chlorhexidine 0.12 % solution    PERIDEX    473 mL    RINSE WITH 15 MLS ORALLY TWICE DAILY    Healthcare maintenance       * EASY TOUCH LANCETS 32G/TWIST Misc     100 each    TEST 1 TIMES BY FINGER POKE ROUTE EVERY DAY [facility] -UL E11.9    Diabetes mellitus, type 2 (H)       * EASY TOUCH LANCETS  32G/TWIST Misc     100 each    1 Device by Lancet route daily Use to check blood sugar as directed by your provider.    Diabetes mellitus, type 2 (H)       metFORMIN 1000 MG tablet    GLUCOPHAGE    60 tablet    Take one (1) tablet BY MOUTH twice daily    Type 2 diabetes mellitus without complication, without long-term current use of insulin (H)       order for DME     1 Device    Equipment being ordered: Wheeled walker with seat  Patient has frequent falls, and need to rest often (needs seat)    Unspecified intellectual disabilities, Arthritis       * order for DME     1 Device    Equipment being ordered: Diabetic shoes dx: DM type 2, controlled, last visit 7/24/15, poor circulation as comorbidity    Type 2 diabetes mellitus without complication, without long-term current use of insulin (H)       * order for DME     1 Device    Equipment being ordered: order for DME Sig: Equipment being ordered: Diabetic shoes dx: DM type 2, controlled, last visit 2.18.18 , poor circulation as comorbidity    Type 2 diabetes mellitus with complication, without long-term current use of insulin (H)       ramipril 5 MG capsule    ALTACE    30 capsule    Take one (1) capsule BY MOUTH daily    HTN (hypertension)       ranitidine 300 MG tablet    ZANTAC    30 tablet    Take one (1) tablet BY MOUTH daily    Hives       simvastatin 10 MG tablet    ZOCOR    30 tablet    Take one (1) tablet BY MOUTH daily    Mixed hyperlipidemia       triamcinolone 0.1 % cream    KENALOG    80 g    APPLY TO AFFECTED AREA(S) TOPICALLY TWICE DAILY    Contact dermatitis due to poison ivy       * Notice:  This list has 4 medication(s) that are the same as other medications prescribed for you. Read the directions carefully, and ask your doctor or other care provider to review them with you.

## 2018-05-29 DIAGNOSIS — E11.9 TYPE 2 DIABETES MELLITUS WITHOUT COMPLICATION, WITHOUT LONG-TERM CURRENT USE OF INSULIN (H): Primary | ICD-10-CM

## 2018-05-29 RX ORDER — GLIPIZIDE 5 MG/1
5 TABLET, FILM COATED, EXTENDED RELEASE ORAL DAILY
Qty: 30 TABLET | Refills: 5 | Status: SHIPPED | OUTPATIENT
Start: 2018-05-29 | End: 2018-10-13

## 2018-06-12 DIAGNOSIS — E11.9 TYPE 2 DIABETES MELLITUS WITHOUT COMPLICATION, WITHOUT LONG-TERM CURRENT USE OF INSULIN (H): ICD-10-CM

## 2018-08-21 DIAGNOSIS — L50.9 HIVES: ICD-10-CM

## 2018-08-21 RX ORDER — CETIRIZINE HYDROCHLORIDE 10 MG/1
TABLET, FILM COATED ORAL
Qty: 30 TABLET | Refills: 11 | Status: SHIPPED | OUTPATIENT
Start: 2018-08-21 | End: 2019-08-27

## 2018-08-21 NOTE — TELEPHONE ENCOUNTER
Cetirizine HCL 10mg  This medication is on the discontinue list.       Last Written Prescription Date:  Start 5/18/16-end 2/9/17  Last Fill Quantity: 30,   # refills: 9  Last Office Visit: 5/24/18

## 2018-08-21 NOTE — TELEPHONE ENCOUNTER
[facility]        Routing refill request to provider for review/approval because:  Drug not active on patient's medication list

## 2018-09-18 DIAGNOSIS — Z00.00 HEALTHCARE MAINTENANCE: ICD-10-CM

## 2018-09-19 RX ORDER — CHLORHEXIDINE GLUCONATE ORAL RINSE 1.2 MG/ML
SOLUTION DENTAL
Qty: 473 ML | Refills: 1 | Status: SHIPPED | OUTPATIENT
Start: 2018-09-19 | End: 2018-10-30

## 2018-10-09 DIAGNOSIS — L23.7 CONTACT DERMATITIS DUE TO POISON IVY: ICD-10-CM

## 2018-10-11 RX ORDER — TRIAMCINOLONE ACETONIDE 1 MG/G
CREAM TOPICAL
Qty: 80 G | Refills: 0 | Status: SHIPPED | OUTPATIENT
Start: 2018-10-11 | End: 2018-11-20

## 2018-10-11 NOTE — TELEPHONE ENCOUNTER
triamcinolone (KENALOG) 0.1 % cream     Last Written Prescription Date:  10/04/2018  Last Fill Quantity: 80g,   # refills: 1  Last Office Visit: 05/24/2018  Future Office visit:       Routing refill request to provider for review/approval because:

## 2018-10-30 DIAGNOSIS — Z00.00 HEALTHCARE MAINTENANCE: ICD-10-CM

## 2018-10-30 RX ORDER — CHLORHEXIDINE GLUCONATE ORAL RINSE 1.2 MG/ML
SOLUTION DENTAL
Qty: 473 ML | Refills: 11 | Status: SHIPPED | OUTPATIENT
Start: 2018-10-30 | End: 2019-07-31

## 2018-11-20 DIAGNOSIS — L23.7 CONTACT DERMATITIS DUE TO POISON IVY: ICD-10-CM

## 2018-11-20 RX ORDER — TRIAMCINOLONE ACETONIDE 1 MG/G
CREAM TOPICAL
Qty: 80 G | Refills: 0 | Status: SHIPPED | OUTPATIENT
Start: 2018-11-20 | End: 2018-12-18

## 2018-12-28 NOTE — PROGRESS NOTES
SUBJECTIVE:                                                    Aki Navarro is a 63 year old male who presents to clinic today for the following health issues:      Diabetes Follow-up    Patient is checking blood sugars: once daily.  Results are as follows:         am - typically elevated ???    Diabetic concerns: None     Symptoms of hypoglycemia (low blood sugar): none     Paresthesias (numbness or burning in feet) or sores: ??     Date of last diabetic eye exam: 3-2018    Family is concerned about elevated readings and poor control, but they are also concerned about being on multiple medications for diabetes.    Diabetes Management Resources    Hyperlipidemia Follow-Up      Rate your low fat/cholesterol diet?: good    Taking statin?  Yes, Zocor    Other lipid medications/supplements?:  none    Hypertension Follow-up      Outpatient blood pressures are being checked at home.  Results ?? Takes monthly.    Low Salt Diet: not monitoring salt    BP Readings from Last 2 Encounters:   05/24/18 108/68   04/19/18 110/68     Hemoglobin A1C (%)   Date Value   05/24/2018 7.3 (H)   02/08/2018 6.9 (H)     LDL Cholesterol Calculated (mg/dL)   Date Value   05/24/2018 51   02/08/2018 13       Amount of exercise or physical activity: daily stretches    Problems taking medications regularly: No    Medication side effects: none    Diet: low fat/cholesterol and diabetic            Problem list and histories reviewed & adjusted, as indicated.  Additional history: as documented    Patient Active Problem List   Diagnosis     Advanced care planning/counseling discussion     Diabetes mellitus, type 2 (H)     Essential hypertension, benign     Hyperlipidemia     Mental retardation     Arthritis     Past Surgical History:   Procedure Laterality Date     MOUTH SURGERY  2008     undescended testicle surgery         Social History     Tobacco Use     Smoking status: Never Smoker     Smokeless tobacco: Never Used   Substance Use Topics      Alcohol use: Yes     Comment: 1 beer occasionally     Family History   Problem Relation Age of Onset     Cancer Mother         brain cancer - cause of death     Diabetes Mother 60     Cancer Father 76        renal cancer - cause of death     Diabetes Sister 50         Current Outpatient Medications   Medication Sig Dispense Refill     ACETAMINOPHEN PO Take 325 mg by mouth as needed for pain (4-6 hours)        ALL DAY ALLERGY 10 MG tablet TAKE ONE (1) TABLET BY MOUTH DAILY 30 tablet 11     chlorhexidine (PERIDEX) 0.12 % solution RINSE WITH 15 MLS ORALLY TWICE DAILY 473 mL 11     EASY STEP TEST test strip TEST ONCE DAILY VIA FINGER POKE 50 strip 11     EASY TOUCH LANCETS 32G/TWIST MISC 1 Device by Lancet route daily Use to check blood sugar as directed by your provider. 100 each 5     EASY TOUCH LANCETS 32G/TWIST MISC TEST 1 TIMES BY FINGER POKE ROUTE EVERY DAY STS -QC E11.9 100 each 2     glipiZIDE (GLUCOTROL XL) 5 MG 24 hr tablet Take 1 tablet (5 mg) by mouth daily 30 tablet 0     metFORMIN (GLUCOPHAGE) 1000 MG tablet TAKE ONE (1) TABLET BY MOUTH TWICE DAILY 60 tablet 0     Multiple Vitamins-Minerals (CENTROVITE) TABS TAKE ONE (1) TABLET BY MOUTH DAILY 130 tablet 1     order for DME Equipment being ordered: order for DME   Sig: Equipment being ordered: Diabetic shoes dx: DM type 2, controlled, last visit 2.18.18 , poor circulation as comorbidity 1 Device 0     order for DME Equipment being ordered: Diabetic shoes dx: DM type 2, controlled, last visit 7/24/15, poor circulation as comorbidity 1 Device 0     ORDER FOR DME Equipment being ordered: Wheeled walker with seat    Patient has frequent falls, and need to rest often (needs seat) 1 Device 0     ramipril (ALTACE) 5 MG capsule Take one (1) capsule BY MOUTH daily 30 capsule 11     ranitidine (ZANTAC) 300 MG tablet TAKE ONE (1) TABLET BY MOUTH DAILY 30 tablet 9     simvastatin (ZOCOR) 10 MG tablet TAKE ONE (1) TABLET BY MOUTH DAILY 30 tablet 10      triamcinolone (KENALOG) 0.1 % external cream APPLY TO AFFECTED AREA(S) TOPICALLY TWICE DAILY 80 g 0     Allergies   Allergen Reactions     Tape [Adhesive Tape]        ROS:  Constitutional, HEENT, cardiovascular, pulmonary, gi and gu systems are negative, except as otherwise noted.    OBJECTIVE:     /70 (BP Location: Right arm, Patient Position: Sitting, Cuff Size: Adult Large)   Pulse 80   Resp 14   Wt 71.7 kg (158 lb)   BMI 28.90 kg/m    Body mass index is 28.9 kg/m .  GENERAL: healthy, alert and no distress  RESP: lungs clear to auscultation - no rales, rhonchi or wheezes  CV: regular rates and rhythm, normal S1 S2, no S3 or S4 and no murmur, click or rub  PSYCH: mentation appears normal, affect normal/bright    Diagnostic Test Results:  none     ASSESSMENT/PLAN:     Diabetes Type II, A1c Controlled, non-insulin dependent   Associated with the following complications    Renal Complications:  None    Ophthalmologic Complications: None    Neurologic Complications: None    Peripheral Vascular Complications:  None    Other: None   Plan:  Labs:  See orders      Hyperlipidemia; controlled   Plan:  Labs:   Lipid and ALT    Hypertension; controlled   Associated with the following complications:    Diabetes   Plan:  Labs:   BMP        ICD-10-CM    1. Type 2 diabetes mellitus without complication, without long-term current use of insulin (H) E11.9 Hemoglobin A1c     Estimated Average Glucose     Estimated Average Glucose   2. Hyperlipidemia, unspecified hyperlipidemia type E78.5 ALT     Lipid Profile   3. Essential hypertension, benign I10 Basic metabolic panel       FUTURE APPOINTMENTS:       - Follow-up visit in 6 months, sooner as needed.      Nicole Rojas MD  [facility]

## 2019-01-04 ENCOUNTER — OFFICE VISIT (OUTPATIENT)
Dept: FAMILY MEDICINE | Facility: OTHER | Age: 64
End: 2019-01-04
Attending: FAMILY MEDICINE
Payer: MEDICARE

## 2019-01-04 VITALS
RESPIRATION RATE: 14 BRPM | SYSTOLIC BLOOD PRESSURE: 110 MMHG | WEIGHT: 158 LBS | BODY MASS INDEX: 28.9 KG/M2 | HEART RATE: 80 BPM | DIASTOLIC BLOOD PRESSURE: 70 MMHG

## 2019-01-04 DIAGNOSIS — I10 ESSENTIAL HYPERTENSION, BENIGN: ICD-10-CM

## 2019-01-04 DIAGNOSIS — E78.5 HYPERLIPIDEMIA, UNSPECIFIED HYPERLIPIDEMIA TYPE: ICD-10-CM

## 2019-01-04 DIAGNOSIS — E11.9 TYPE 2 DIABETES MELLITUS WITHOUT COMPLICATION, WITHOUT LONG-TERM CURRENT USE OF INSULIN (H): Primary | ICD-10-CM

## 2019-01-04 PROCEDURE — 83036 HEMOGLOBIN GLYCOSYLATED A1C: CPT | Mod: ZL | Performed by: FAMILY MEDICINE

## 2019-01-04 PROCEDURE — 80061 LIPID PANEL: CPT | Mod: ZL | Performed by: FAMILY MEDICINE

## 2019-01-04 PROCEDURE — G0463 HOSPITAL OUTPT CLINIC VISIT: HCPCS

## 2019-01-04 PROCEDURE — 99214 OFFICE O/P EST MOD 30 MIN: CPT | Performed by: FAMILY MEDICINE

## 2019-01-04 PROCEDURE — 36415 COLL VENOUS BLD VENIPUNCTURE: CPT | Mod: ZL | Performed by: FAMILY MEDICINE

## 2019-01-04 PROCEDURE — 84460 ALANINE AMINO (ALT) (SGPT): CPT | Mod: ZL | Performed by: FAMILY MEDICINE

## 2019-01-04 PROCEDURE — 40000788 ZZHCL STATISTIC ESTIMATED AVERAGE GLUCOSE: Mod: ZL | Performed by: FAMILY MEDICINE

## 2019-01-04 PROCEDURE — 80048 BASIC METABOLIC PNL TOTAL CA: CPT | Mod: ZL | Performed by: FAMILY MEDICINE

## 2019-01-04 ASSESSMENT — PAIN SCALES - GENERAL: PAINLEVEL: NO PAIN (0)

## 2019-01-04 NOTE — NURSING NOTE
"Chief Complaint   Patient presents with     Hypertension     Diabetes     Lipids       Initial /70 (BP Location: Right arm, Patient Position: Sitting, Cuff Size: Adult Large)   Pulse 80   Resp 14   Wt 71.7 kg (158 lb)   BMI 28.90 kg/m   Estimated body mass index is 28.9 kg/m  as calculated from the following:    Height as of 5/24/18: 1.575 m (5' 2\").    Weight as of this encounter: 71.7 kg (158 lb).  Medication Reconciliation: complete    Cyn Marcus LPN    "

## 2019-01-07 DIAGNOSIS — E11.9 TYPE 2 DIABETES MELLITUS WITHOUT COMPLICATION, WITHOUT LONG-TERM CURRENT USE OF INSULIN (H): ICD-10-CM

## 2019-01-07 LAB
ALT SERPL W P-5'-P-CCNC: 26 U/L (ref 0–70)
ANION GAP SERPL CALCULATED.3IONS-SCNC: 8 MMOL/L (ref 3–14)
BUN SERPL-MCNC: 21 MG/DL (ref 7–30)
CALCIUM SERPL-MCNC: 8.7 MG/DL (ref 8.5–10.1)
CHLORIDE SERPL-SCNC: 101 MMOL/L (ref 94–109)
CHOLEST SERPL-MCNC: 113 MG/DL
CO2 SERPL-SCNC: 26 MMOL/L (ref 20–32)
CREAT SERPL-MCNC: 0.85 MG/DL (ref 0.66–1.25)
EST. AVERAGE GLUCOSE BLD GHB EST-MCNC: 157 MG/DL
GFR SERPL CREATININE-BSD FRML MDRD: >90 ML/MIN/{1.73_M2}
GLUCOSE SERPL-MCNC: 100 MG/DL (ref 70–99)
HBA1C MFR BLD: 7.1 % (ref 0–5.6)
HDLC SERPL-MCNC: 39 MG/DL
LDLC SERPL CALC-MCNC: 37 MG/DL
NONHDLC SERPL-MCNC: 74 MG/DL
POTASSIUM SERPL-SCNC: 4.3 MMOL/L (ref 3.4–5.3)
SODIUM SERPL-SCNC: 135 MMOL/L (ref 133–144)
TRIGL SERPL-MCNC: 184 MG/DL

## 2019-01-08 DIAGNOSIS — Z00.00 HEALTHCARE MAINTENANCE: ICD-10-CM

## 2019-01-08 RX ORDER — MULTIVITAMIN/IRON/FOLIC ACID 18MG-0.4MG
TABLET ORAL
Qty: 130 TABLET | Refills: 1 | Status: SHIPPED | OUTPATIENT
Start: 2019-01-08 | End: 2019-10-15

## 2019-01-08 RX ORDER — GLIPIZIDE 5 MG/1
TABLET, FILM COATED, EXTENDED RELEASE ORAL
Qty: 30 TABLET | Refills: 5 | Status: SHIPPED | OUTPATIENT
Start: 2019-01-08 | End: 2019-06-11

## 2019-01-08 NOTE — TELEPHONE ENCOUNTER
Multiple Vitamins-Minerals (CENTROVITE) TABS  Last Written Prescription Date:  10/4/18  Last Fill Quantity: 130,   # refills: 1  Last Office Visit: 1/4/19  Future Office visit:

## 2019-01-29 DIAGNOSIS — I10 HTN (HYPERTENSION): ICD-10-CM

## 2019-01-29 DIAGNOSIS — L23.7 CONTACT DERMATITIS DUE TO POISON IVY: ICD-10-CM

## 2019-01-30 RX ORDER — TRIAMCINOLONE ACETONIDE 1 MG/G
CREAM TOPICAL
Qty: 80 G | Refills: 0 | Status: SHIPPED | OUTPATIENT
Start: 2019-01-30 | End: 2019-02-12

## 2019-01-30 RX ORDER — RAMIPRIL 5 MG/1
CAPSULE ORAL
Qty: 30 CAPSULE | Refills: 8 | Status: SHIPPED | OUTPATIENT
Start: 2019-01-30 | End: 2019-10-23

## 2019-02-18 DIAGNOSIS — E11.9 TYPE 2 DIABETES MELLITUS WITHOUT COMPLICATION, WITHOUT LONG-TERM CURRENT USE OF INSULIN (H): ICD-10-CM

## 2019-03-04 ENCOUNTER — TELEPHONE (OUTPATIENT)
Dept: FAMILY MEDICINE | Facility: OTHER | Age: 64
End: 2019-03-04

## 2019-03-04 DIAGNOSIS — E11.65 TYPE 2 DIABETES MELLITUS WITH HYPERGLYCEMIA, WITHOUT LONG-TERM CURRENT USE OF INSULIN (H): ICD-10-CM

## 2019-03-04 RX ORDER — LANCETS 32 GAUGE
1 EACH MISCELLANEOUS 2 TIMES DAILY
Qty: 100 EACH | Refills: 3 | Status: SHIPPED | OUTPATIENT
Start: 2019-03-04 | End: 2020-08-06

## 2019-03-05 DIAGNOSIS — L23.7 CONTACT DERMATITIS DUE TO POISON IVY: ICD-10-CM

## 2019-03-06 RX ORDER — TRIAMCINOLONE ACETONIDE 1 MG/G
CREAM TOPICAL
Qty: 80 G | Refills: 0 | Status: SHIPPED | OUTPATIENT
Start: 2019-03-06 | End: 2019-03-28

## 2019-03-06 NOTE — TELEPHONE ENCOUNTER
TRIAMCINOLONE ACETONIDE 0.1 % CREAM (G)      Last Written Prescription Date:  2/12/19  Last Fill Quantity: 80 g,   # refills: 0  Last Office Visit: 5/24/18  Future Office visit:

## 2019-03-15 ENCOUNTER — TELEPHONE (OUTPATIENT)
Dept: FAMILY MEDICINE | Facility: OTHER | Age: 64
End: 2019-03-15

## 2019-03-15 NOTE — TELEPHONE ENCOUNTER
A staff member from the [facility] calls today requesting an order to be faxed to the [facility] at: [phone] stating that he needs his blood sugar tested BID. Please Advise.

## 2019-03-21 ENCOUNTER — HOSPITAL ENCOUNTER (EMERGENCY)
Facility: HOSPITAL | Age: 64
Discharge: HOME | End: 2019-03-21
Attending: EMERGENCY MEDICINE | Admitting: EMERGENCY MEDICINE
Payer: MEDICARE

## 2019-03-21 VITALS
SYSTOLIC BLOOD PRESSURE: 149 MMHG | HEART RATE: 91 BPM | OXYGEN SATURATION: 99 % | DIASTOLIC BLOOD PRESSURE: 89 MMHG | RESPIRATION RATE: 16 BRPM | TEMPERATURE: 97.4 F

## 2019-03-21 DIAGNOSIS — S01.01XA LACERATION OF SCALP, INITIAL ENCOUNTER: Primary | ICD-10-CM
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
## 2019-03-21 PROCEDURE — 99282 EMERGENCY DEPT VISIT SF MDM: CPT | Mod: Z6 | Performed by: EMERGENCY MEDICINE

## 2019-03-21 PROCEDURE — 99282 EMERGENCY DEPT VISIT SF MDM: CPT

## 2019-03-21 NOTE — ED PROVIDER NOTES
History     Chief Complaint   Patient presents with     Head Laceration     Fall     HPI  Aki Navarro is a 63 year old male who presented to the emergency department via EMS.  Patient is nonverbal and therefore all the history was obtained from the nursing staff who in turn obtained it from EMS.  Patient was walking using his walker which slid and he fell backwards hitting the back of his head against a hard surface.  He sustained a small laceration on the back of the head.  Bleeding was controlled by direct pressure.  No further history is available.    Allergies:  Allergies   Allergen Reactions     Tape [Adhesive Tape]        Problem List:    Patient Active Problem List    Diagnosis Date Noted     Arthritis 09/24/2013     Priority: Medium     Advanced care planning/counseling discussion 09/19/2012     Priority: Medium     Essential hypertension, benign 01/11/2011     Priority: Medium     Hyperlipidemia 01/11/2011     Priority: Medium     Mental retardation 01/11/2011     Priority: Medium     Diabetes mellitus, type 2 (H) 04/28/2005     Priority: Medium        Past Medical History:    Past Medical History:   Diagnosis Date     Allergic rhinitis, cause unspecified 1/11/2011     Arthritis 9/24/2013     Essential hypertension, benign 1/11/2011     Impacted cerumen of both ears 10/12/2012     Other and unspecified hyperlipidemia 1/11/2011     Type II or unspecified type diabetes mellitus without mention of complication 4/28/2005     Unspecified intellectual disabilities 1/11/2011       Past Surgical History:    Past Surgical History:   Procedure Laterality Date     MOUTH SURGERY  2008     undescended testicle surgery         Family History:    Family History   Problem Relation Age of Onset     Cancer Mother         brain cancer - cause of death     Diabetes Mother 60     Cancer Father 76        renal cancer - cause of death     Diabetes Sister 50       Social History:  Marital Status:  Single [1]  Social History      Tobacco Use     Smoking status: Never Smoker     Smokeless tobacco: Never Used   Substance Use Topics     Alcohol use: Yes     Comment: 1 beer occasionally     Drug use: No        Medications:      ACETAMINOPHEN PO   ALL DAY ALLERGY 10 MG tablet   blood glucose (EASY STEP TEST) test strip   chlorhexidine (PERIDEX) 0.12 % solution   EASY TOUCH LANCETS 32G/TWIST MISC   glipiZIDE (GLUCOTROL XL) 5 MG 24 hr tablet   metFORMIN (GLUCOPHAGE) 1000 MG tablet   Multiple Vitamins-Minerals (CENTROVITE) TABS   order for DME   order for DME   ORDER FOR DME   ramipril (ALTACE) 5 MG capsule   ranitidine (ZANTAC) 300 MG tablet   simvastatin (ZOCOR) 10 MG tablet   triamcinolone (KENALOG) 0.1 % external cream         Review of Systems   Unable to perform ROS: Patient nonverbal       Physical Exam   BP: 166/97  Pulse: 71  Temp: 96.6  F (35.9  C)  Resp: 16  SpO2: 96 %      Physical Exam   Constitutional: He appears well-developed and well-nourished. No distress.   HENT:   Head: Normocephalic.       Eyes: EOM are normal. Pupils are equal, round, and reactive to light.   Cardiovascular: Normal rate, regular rhythm, normal heart sounds and intact distal pulses.   Pulmonary/Chest: Effort normal and breath sounds normal. No stridor. No respiratory distress.   Musculoskeletal: He exhibits no edema, tenderness or deformity.   Neurological: He is alert.   Skin: He is not diaphoretic.   Nursing note and vitals reviewed.      ED Course        Procedures    No results found for this or any previous visit (from the past 24 hour(s)).    Medications - No data to display    Assessments & Plan (with Medical Decision Making)   Superficial scalp laceration post fall: Laceration approximately 1 cm, superficial skin flap that does not require suturing.  This was cleaned and dressing applied.  Bleeding controlled with direct pressure.  Discharged from the ED in stable condition.  Follow-up with PCP in 1 week to for wound recheck.    I have reviewed the  nursing notes.    I have reviewed the findings, diagnosis, plan and need for follow up with the patient.       Medication List      There are no discharge medications for this visit.         Final diagnoses:   Fall, initial encounter   Laceration of scalp, initial encounter       3/21/2019   [facility]     Ventura Aguilar MD  03/22/19 1028

## 2019-03-21 NOTE — ED NOTES
Pt to rm 1 in er via [place] [facility] after having fallen at [facility] while ambulating with walker, struck head on cabinet, has skin tear on back of head with no active bleeding now, had no loc per staff from [facility], pt denies pain, is nonverbal per his norm able to answer yes or no

## 2019-03-21 NOTE — ED AVS SNAPSHOT
[facility]  [address]  [place] [address]  Phone:  [phone]                                    Aki Navarro   MRN: [id number]    Department:  [facility]   Date of Visit:  3/21/2019           After Visit Summary Signature Page    I have received my discharge instructions, and my questions have been answered. I have discussed any challenges I see with this plan with the nurse or doctor.    ..........................................................................................................................................  Patient/Patient Representative Signature      ..........................................................................................................................................  Patient Representative Print Name and Relationship to Patient    ..................................................               ................................................  Date                                   Time    ..........................................................................................................................................  Reviewed by Signature/Title    ...................................................              ..............................................  Date                                               Time          22EPIC Rev 08/18

## 2019-03-27 ENCOUNTER — TELEPHONE (OUTPATIENT)
Dept: INTERNAL MEDICINE | Facility: OTHER | Age: 64
End: 2019-03-27

## 2019-03-27 ENCOUNTER — HOSPITAL ENCOUNTER (EMERGENCY)
Facility: HOSPITAL | Age: 64
Discharge: HOME | End: 2019-03-27
Attending: NURSE PRACTITIONER | Admitting: NURSE PRACTITIONER
Payer: MEDICARE

## 2019-03-27 VITALS
TEMPERATURE: 97.8 F | BODY MASS INDEX: 27.62 KG/M2 | SYSTOLIC BLOOD PRESSURE: 125 MMHG | WEIGHT: 151 LBS | RESPIRATION RATE: 16 BRPM | OXYGEN SATURATION: 97 % | DIASTOLIC BLOOD PRESSURE: 72 MMHG

## 2019-03-27 DIAGNOSIS — S01.00XD OPEN WOUND OF SCALP, UNSPECIFIED OPEN WOUND TYPE, SUBSEQUENT ENCOUNTER: ICD-10-CM

## 2019-03-27 PROCEDURE — G0463 HOSPITAL OUTPT CLINIC VISIT: HCPCS

## 2019-03-27 PROCEDURE — 99213 OFFICE O/P EST LOW 20 MIN: CPT | Performed by: NURSE PRACTITIONER

## 2019-03-27 RX ORDER — MUPIROCIN CALCIUM 20 MG/G
CREAM TOPICAL 3 TIMES DAILY
Qty: 15 G | Refills: 0 | Status: SHIPPED | OUTPATIENT
Start: 2019-03-27 | End: 2021-09-02

## 2019-03-27 ASSESSMENT — ENCOUNTER SYMPTOMS
ABDOMINAL PAIN: 0
ARTHRALGIAS: 0
SHORTNESS OF BREATH: 0
MYALGIAS: 0
COUGH: 0
CARDIOVASCULAR NEGATIVE: 1
HEADACHES: 0
PSYCHIATRIC NEGATIVE: 1
FEVER: 0
WOUND: 1
DIZZINESS: 0

## 2019-03-27 NOTE — DISCHARGE INSTRUCTIONS
1. Looks like a scab forming  2. Keep clean and dry  3. Bactroban to wound 3 times a day.   4. Bandaid on during day, off at night to air.

## 2019-03-27 NOTE — ED AVS SNAPSHOT
[facility]  [address]  [place] [address]  Phone:  [phone]                                    Aki Navarro   MRN: [id number]    Department:  [facility]   Date of Visit:  3/27/2019           After Visit Summary Signature Page    I have received my discharge instructions, and my questions have been answered. I have discussed any challenges I see with this plan with the nurse or doctor.    ..........................................................................................................................................  Patient/Patient Representative Signature      ..........................................................................................................................................  Patient Representative Print Name and Relationship to Patient    ..................................................               ................................................  Date                                   Time    ..........................................................................................................................................  Reviewed by Signature/Title    ...................................................              ..............................................  Date                                               Time          22EPIC Rev 08/18

## 2019-03-27 NOTE — TELEPHONE ENCOUNTER
Received a DENIAL from [facility] for Mupirocin cream. They state the patients plan doesn't cover this medication as it is not on their list of formulary drugs. Covered alternatives are: Gentamicin Sulfate (topical) cream, ointment;Silver Sulfadiazine (generic of Silvadene) cream; ssd (generic of Silvadene); Sulfamylon cream. Forms scanned to Epic.

## 2019-03-27 NOTE — ED PROVIDER NOTES
History     Chief Complaint   Patient presents with     Wound Check     HPI  Aki Navarro is a 63 year old male who sustanied a scalp wound on Monday night.  Was in Urgent Care, Wound with small flap. Concern is becoming infected. Some yellow drainage on  Bandaids,  No fevers.      Allergies:  Allergies   Allergen Reactions     Tape [Adhesive Tape]        Problem List:    Patient Active Problem List    Diagnosis Date Noted     Arthritis 09/24/2013     Priority: Medium     Advanced care planning/counseling discussion 09/19/2012     Priority: Medium     Essential hypertension, benign 01/11/2011     Priority: Medium     Hyperlipidemia 01/11/2011     Priority: Medium     Mental retardation 01/11/2011     Priority: Medium     Diabetes mellitus, type 2 (H) 04/28/2005     Priority: Medium        Past Medical History:    Past Medical History:   Diagnosis Date     Allergic rhinitis, cause unspecified 1/11/2011     Arthritis 9/24/2013     Essential hypertension, benign 1/11/2011     Impacted cerumen of both ears 10/12/2012     Other and unspecified hyperlipidemia 1/11/2011     Type II or unspecified type diabetes mellitus without mention of complication 4/28/2005     Unspecified intellectual disabilities 1/11/2011       Past Surgical History:    Past Surgical History:   Procedure Laterality Date     MOUTH SURGERY  2008     undescended testicle surgery         Family History:    Family History   Problem Relation Age of Onset     Cancer Mother         brain cancer - cause of death     Diabetes Mother 60     Cancer Father 76        renal cancer - cause of death     Diabetes Sister 50       Social History:  Marital Status:  Single [1]  Social History     Tobacco Use     Smoking status: Never Smoker     Smokeless tobacco: Never Used   Substance Use Topics     Alcohol use: Yes     Comment: 1 beer occasionally     Drug use: No        Medications:      ACETAMINOPHEN PO   ALL DAY ALLERGY 10 MG tablet   blood glucose (EASY STEP  TEST) test strip   chlorhexidine (PERIDEX) 0.12 % solution   EASY TOUCH LANCETS 32G/TWIST MISC   glipiZIDE (GLUCOTROL XL) 5 MG 24 hr tablet   metFORMIN (GLUCOPHAGE) 1000 MG tablet   Multiple Vitamins-Minerals (CENTROVITE) TABS   mupirocin (BACTROBAN) 2 % external cream   order for DME   order for DME   ORDER FOR DME   ramipril (ALTACE) 5 MG capsule   ranitidine (ZANTAC) 300 MG tablet   simvastatin (ZOCOR) 10 MG tablet   triamcinolone (KENALOG) 0.1 % external cream         Review of Systems   Constitutional: Negative for fever.   Respiratory: Negative for cough and shortness of breath.    Cardiovascular: Negative.    Gastrointestinal: Negative for abdominal pain.   Musculoskeletal: Negative for arthralgias and myalgias.   Skin: Positive for wound.   Neurological: Negative for dizziness and headaches.   Psychiatric/Behavioral: Negative.        Physical Exam   BP: 125/72  Heart Rate: 101  Temp: 97.8  F (36.6  C)  Resp: 16  Weight: 68.5 kg (151 lb)  SpO2: 97 %      Physical Exam   HENT:   Right Ear: External ear normal.   Left Ear: External ear normal.   Nose: Nose normal.   Mouth/Throat: Oropharynx is clear and moist.   Larger tongue/     Eyes: Conjunctivae are normal. Pupils are equal, round, and reactive to light.   Neck: Normal range of motion. Neck supple. No thyromegaly present.   Cardiovascular: Regular rhythm and normal heart sounds.   No murmur heard.  Pulmonary/Chest: Effort normal and breath sounds normal.   Musculoskeletal:   Uses walker, ab.e to get up on exam table with assist.     Lymphadenopathy:     He has no cervical adenopathy.   Neurological: He is alert.   No speech.    Skin: Skin is warm and dry.   Flap adhered to scalp. Opaque color. No drainage, no redness or swelling at this time.     Psychiatric: He has a normal mood and affect.       ED Course        Procedures            No results found for this or any previous visit (from the past 24 hour(s)).    Medications - No data to  display    Assessments & Plan (with Medical Decision Making)     I have reviewed the nursing notes.    I have reviewed the findings, diagnosis, plan and need for follow up with the patient.         Medication List      Started    mupirocin 2 % external cream  Commonly known as:  BACTROBAN  Topical, 3 TIMES DAILY            Final diagnoses:   Open wound of scalp, unspecified open wound type, subsequent encounter     ASSESSMENT / PLAN:  (S01.00XD) Open wound of scalp, unspecified open wound type, subsequent encounter  Comment:Does not look infected today.    Plan:     1. Bactroban to wound 3 times a day,   2. Bandaid on during day , and off at night to air wound.    3/27/2019   [facility]     Brody Rausch NP  03/27/19 [id number]

## 2019-03-27 NOTE — ED TRIAGE NOTES
Pt presents with wound to the back of head. Pt fell on Monday hitting the head. Yellow discharge noted to area. Patient denies pain. Caregiver states they want to make sure area is healing. Was seen here on Monday for wound. Neosporin has been being applied.

## 2019-03-27 NOTE — ED TRIAGE NOTES
Pt fell on Monday causing a wound to the back of his head. Caregiver would like area checked to make sure it is healing. States has been putting neosporin on it.

## 2019-03-28 DIAGNOSIS — L23.7 CONTACT DERMATITIS DUE TO POISON IVY: ICD-10-CM

## 2019-03-28 RX ORDER — TRIAMCINOLONE ACETONIDE 1 MG/G
CREAM TOPICAL
Qty: 80 G | Refills: 1 | Status: SHIPPED | OUTPATIENT
Start: 2019-03-28 | End: 2019-06-11

## 2019-04-16 DIAGNOSIS — L23.7 CONTACT DERMATITIS DUE TO POISON IVY: ICD-10-CM

## 2019-04-17 RX ORDER — TRIAMCINOLONE ACETONIDE 1 MG/G
CREAM TOPICAL
Qty: 80 G | Refills: 1 | OUTPATIENT
Start: 2019-04-17

## 2019-04-25 ENCOUNTER — TRANSFERRED RECORDS (OUTPATIENT)
Dept: HEALTH INFORMATION MANAGEMENT | Facility: CLINIC | Age: 64
End: 2019-04-25

## 2019-04-25 ENCOUNTER — HOSPITAL ENCOUNTER (EMERGENCY)
Facility: HOSPITAL | Age: 64
Discharge: HOME | End: 2019-04-25
Attending: NURSE PRACTITIONER | Admitting: NURSE PRACTITIONER
Payer: MEDICARE

## 2019-04-25 VITALS
TEMPERATURE: 97 F | RESPIRATION RATE: 16 BRPM | DIASTOLIC BLOOD PRESSURE: 84 MMHG | HEART RATE: 95 BPM | OXYGEN SATURATION: 98 % | SYSTOLIC BLOOD PRESSURE: 131 MMHG

## 2019-04-25 DIAGNOSIS — S61.214A LACERATION OF RIGHT RING FINGER WITHOUT FOREIGN BODY WITHOUT DAMAGE TO NAIL, INITIAL ENCOUNTER: ICD-10-CM

## 2019-04-25 PROCEDURE — 99213 OFFICE O/P EST LOW 20 MIN: CPT | Mod: Z6 | Performed by: NURSE PRACTITIONER

## 2019-04-25 PROCEDURE — G0463 HOSPITAL OUTPT CLINIC VISIT: HCPCS

## 2019-04-25 ASSESSMENT — ENCOUNTER SYMPTOMS
NUMBNESS: 0
CONSTITUTIONAL NEGATIVE: 1
ARTHRALGIAS: 0
WOUND: 1
MYALGIAS: 0

## 2019-04-25 NOTE — ED NOTES
Patient with small laceration to palmar aspect of right ring finger.  Caregiver unsure what it was cut on or when, possibly last night.   Bleeding controlled.   Hand soaking in warm soapy water.

## 2019-04-25 NOTE — ED PROVIDER NOTES
History     Chief Complaint   Patient presents with     Laceration     finger- right ring     HPI  Aki Navarro is a right hand dominate 63 year old male who presents today with a CC of right ring finger laceration that happened sometime yesterday afternoon.  He cut it on the side of a laundry basket.  It continues to ooze.  The group home staff have tried guaze on it, he has a bandaid adhesive allergy so they have avoided bandaids.  He is up to date with tetanus as of 2012.  Full ROM of the right ring finger.      Allergies:  Allergies   Allergen Reactions     Tape [Adhesive Tape]        Problem List:    Patient Active Problem List    Diagnosis Date Noted     Arthritis 09/24/2013     Priority: Medium     Advanced care planning/counseling discussion 09/19/2012     Priority: Medium     Essential hypertension, benign 01/11/2011     Priority: Medium     Hyperlipidemia 01/11/2011     Priority: Medium     Mental retardation 01/11/2011     Priority: Medium     Diabetes mellitus, type 2 (H) 04/28/2005     Priority: Medium        Past Medical History:    Past Medical History:   Diagnosis Date     Allergic rhinitis, cause unspecified 1/11/2011     Arthritis 9/24/2013     Essential hypertension, benign 1/11/2011     Impacted cerumen of both ears 10/12/2012     Other and unspecified hyperlipidemia 1/11/2011     Type II or unspecified type diabetes mellitus without mention of complication 4/28/2005     Unspecified intellectual disabilities 1/11/2011       Past Surgical History:    Past Surgical History:   Procedure Laterality Date     MOUTH SURGERY  2008     undescended testicle surgery         Family History:    Family History   Problem Relation Age of Onset     Cancer Mother         brain cancer - cause of death     Diabetes Mother 60     Cancer Father 76        renal cancer - cause of death     Diabetes Sister 50       Social History:  Marital Status:  Single [1]  Social History     Tobacco Use     Smoking status: Never  Smoker     Smokeless tobacco: Never Used   Substance Use Topics     Alcohol use: Yes     Comment: 1 beer occasionally     Drug use: No        Medications:      ACETAMINOPHEN PO   ALL DAY ALLERGY 10 MG tablet   blood glucose (EASY STEP TEST) test strip   chlorhexidine (PERIDEX) 0.12 % solution   EASY TOUCH LANCETS 32G/TWIST MISC   glipiZIDE (GLUCOTROL XL) 5 MG 24 hr tablet   metFORMIN (GLUCOPHAGE) 1000 MG tablet   Multiple Vitamins-Minerals (CENTROVITE) TABS   mupirocin (BACTROBAN) 2 % external cream   order for DME   order for DME   ORDER FOR DME   ramipril (ALTACE) 5 MG capsule   ranitidine (ZANTAC) 300 MG tablet   simvastatin (ZOCOR) 10 MG tablet   triamcinolone (KENALOG) 0.1 % external cream         Review of Systems   Constitutional: Negative.    Musculoskeletal: Negative for arthralgias and myalgias.   Skin: Positive for wound.   Neurological: Negative for numbness.       Physical Exam   BP: 131/84  Pulse: 95  Temp: 97  F (36.1  C)  Resp: 16  SpO2: 98 %      Physical Exam   Constitutional: He is oriented to person, place, and time.   HENT:   Head: Normocephalic and atraumatic.   Cardiovascular: Normal rate.   Pulmonary/Chest: Effort normal.   Musculoskeletal: Normal range of motion.        Right hand: He exhibits laceration (1 cm laceration over middle phalanx of palmar surface of ring finger into subcutaneous layer, no FB noted, is oozing blood, the edges approximate fairly well). He exhibits normal range of motion (full ROM right ring finger), no tenderness, no bony tenderness and normal capillary refill.   Neurological: He is alert and oriented to person, place, and time.   Skin: Skin is warm and dry.   Vitals reviewed.      ED Course        Procedures     Washed finger with soap and water, covered with gauze and triple abx ointment and wrapped with coban    Assessments & Plan (with Medical Decision Making)     I have reviewed the nursing notes.    I have reviewed the findings, diagnosis, plan and need for  follow up with the patient.  ASSESSMENT / PLAN:  (I62.596W) Laceration of right ring finger without foreign body without damage to nail, initial encounter  Comment: too old to close  Plan:  Due to patient allergy to adhesives, we avoided steri strips and used guaze and coban to wrap the finger.  Staff should keep a close watch on the finger to assure the coban does not roll causing it to act like a tourniquet.  Please check frequently.  As soon as the cut has stopped oozing stop using the coban to wrap and just use gauze          Medication List      There are no discharge medications for this visit.         Final diagnoses:   Laceration of right ring finger without foreign body without damage to nail, initial encounter       4/25/2019   [facility]     Shirley Torres NP  04/27/19 1152

## 2019-04-25 NOTE — ED NOTES
Discharge instructions reviewed with caregiver with instructions and demonstration to check cap refill on right finger.  Care giver verbalized understanding and if cap refill greater than 3 secs to loosen bandage.   No rx sent home. Form filled out for group home and copied.

## 2019-04-25 NOTE — DISCHARGE INSTRUCTIONS
Due to patient allergy to adhesives, we avoided steri strips and used guaze and coban to wrap the finger.  Staff should keep a close watch on the finger to assure the coban does not roll causing it to act like a tourniquet.  Please check frequently.  As soon as the cut has stopped oozing stop using the coban to wrap and just use gauze and tape

## 2019-04-25 NOTE — ED AVS SNAPSHOT
[facility]  [address]  [place] [address]  Phone:  [phone]                                    Aki Navarro   MRN: [id number]    Department:  [facility]   Date of Visit:  4/25/2019           After Visit Summary Signature Page    I have received my discharge instructions, and my questions have been answered. I have discussed any challenges I see with this plan with the nurse or doctor.    ..........................................................................................................................................  Patient/Patient Representative Signature      ..........................................................................................................................................  Patient Representative Print Name and Relationship to Patient    ..................................................               ................................................  Date                                   Time    ..........................................................................................................................................  Reviewed by Signature/Title    ...................................................              ..............................................  Date                                               Time          22EPIC Rev 08/18

## 2019-05-21 DIAGNOSIS — L50.9 HIVES: ICD-10-CM

## 2019-07-08 ENCOUNTER — OFFICE VISIT (OUTPATIENT)
Dept: FAMILY MEDICINE | Facility: OTHER | Age: 64
End: 2019-07-08
Attending: FAMILY MEDICINE
Payer: MEDICARE

## 2019-07-08 VITALS
TEMPERATURE: 97.1 F | SYSTOLIC BLOOD PRESSURE: 122 MMHG | DIASTOLIC BLOOD PRESSURE: 62 MMHG | HEART RATE: 84 BPM | BODY MASS INDEX: 27.71 KG/M2 | OXYGEN SATURATION: 97 % | WEIGHT: 151.5 LBS

## 2019-07-08 DIAGNOSIS — E78.5 HYPERLIPIDEMIA, UNSPECIFIED HYPERLIPIDEMIA TYPE: ICD-10-CM

## 2019-07-08 DIAGNOSIS — I10 ESSENTIAL HYPERTENSION, BENIGN: ICD-10-CM

## 2019-07-08 DIAGNOSIS — S90.811A ABRASION OF RIGHT FOOT, INITIAL ENCOUNTER: ICD-10-CM

## 2019-07-08 DIAGNOSIS — E11.65 TYPE 2 DIABETES MELLITUS WITH HYPERGLYCEMIA, WITHOUT LONG-TERM CURRENT USE OF INSULIN (H): Primary | ICD-10-CM

## 2019-07-08 LAB
ALT SERPL W P-5'-P-CCNC: 32 U/L (ref 0–70)
ANION GAP SERPL CALCULATED.3IONS-SCNC: 6 MMOL/L (ref 3–14)
BUN SERPL-MCNC: 21 MG/DL (ref 7–30)
CALCIUM SERPL-MCNC: 9.2 MG/DL (ref 8.5–10.1)
CHLORIDE SERPL-SCNC: 104 MMOL/L (ref 94–109)
CHOLEST SERPL-MCNC: 112 MG/DL
CO2 SERPL-SCNC: 28 MMOL/L (ref 20–32)
CREAT SERPL-MCNC: 0.85 MG/DL (ref 0.66–1.25)
ERYTHROCYTE [DISTWIDTH] IN BLOOD BY AUTOMATED COUNT: 12.9 % (ref 10–15)
EST. AVERAGE GLUCOSE BLD GHB EST-MCNC: 146 MG/DL
GFR SERPL CREATININE-BSD FRML MDRD: >90 ML/MIN/{1.73_M2}
GLUCOSE SERPL-MCNC: 59 MG/DL (ref 70–99)
HBA1C MFR BLD: 6.7 % (ref 0–5.6)
HCT VFR BLD AUTO: 40.9 % (ref 40–53)
HDLC SERPL-MCNC: 47 MG/DL
HGB BLD-MCNC: 14.2 G/DL (ref 13.3–17.7)
LDLC SERPL CALC-MCNC: 32 MG/DL
MCH RBC QN AUTO: 31.2 PG (ref 26.5–33)
MCHC RBC AUTO-ENTMCNC: 34.7 G/DL (ref 31.5–36.5)
MCV RBC AUTO: 90 FL (ref 78–100)
NONHDLC SERPL-MCNC: 65 MG/DL
PLATELET # BLD AUTO: 300 10E9/L (ref 150–450)
POTASSIUM SERPL-SCNC: 4 MMOL/L (ref 3.4–5.3)
RBC # BLD AUTO: 4.55 10E12/L (ref 4.4–5.9)
SODIUM SERPL-SCNC: 138 MMOL/L (ref 133–144)
TRIGL SERPL-MCNC: 166 MG/DL
TSH SERPL DL<=0.005 MIU/L-ACNC: 2.69 MU/L (ref 0.4–4)
WBC # BLD AUTO: 6.8 10E9/L (ref 4–11)

## 2019-07-08 PROCEDURE — 40000788 ZZHCL STATISTIC ESTIMATED AVERAGE GLUCOSE: Mod: ZL | Performed by: FAMILY MEDICINE

## 2019-07-08 PROCEDURE — 84460 ALANINE AMINO (ALT) (SGPT): CPT | Mod: ZL | Performed by: FAMILY MEDICINE

## 2019-07-08 PROCEDURE — 84443 ASSAY THYROID STIM HORMONE: CPT | Mod: ZL | Performed by: FAMILY MEDICINE

## 2019-07-08 PROCEDURE — 80061 LIPID PANEL: CPT | Mod: ZL | Performed by: FAMILY MEDICINE

## 2019-07-08 PROCEDURE — G0463 HOSPITAL OUTPT CLINIC VISIT: HCPCS

## 2019-07-08 PROCEDURE — 83036 HEMOGLOBIN GLYCOSYLATED A1C: CPT | Mod: ZL | Performed by: FAMILY MEDICINE

## 2019-07-08 PROCEDURE — 85027 COMPLETE CBC AUTOMATED: CPT | Mod: ZL | Performed by: FAMILY MEDICINE

## 2019-07-08 PROCEDURE — 80048 BASIC METABOLIC PNL TOTAL CA: CPT | Mod: ZL | Performed by: FAMILY MEDICINE

## 2019-07-08 PROCEDURE — 99214 OFFICE O/P EST MOD 30 MIN: CPT | Performed by: FAMILY MEDICINE

## 2019-07-08 PROCEDURE — 36415 COLL VENOUS BLD VENIPUNCTURE: CPT | Mod: ZL | Performed by: FAMILY MEDICINE

## 2019-07-08 ASSESSMENT — PAIN SCALES - GENERAL: PAINLEVEL: NO PAIN (0)

## 2019-07-08 NOTE — NURSING NOTE
"Chief Complaint   Patient presents with     Diabetes     Lipids     Hypertension       Initial /62 (BP Location: Left arm, Patient Position: Sitting, Cuff Size: Adult Regular)   Pulse 84   Temp 97.1  F (36.2  C) (Tympanic)   Wt 68.7 kg (151 lb 8 oz)   SpO2 97%   BMI 27.71 kg/m   Estimated body mass index is 27.71 kg/m  as calculated from the following:    Height as of 5/24/18: 1.575 m (5' 2\").    Weight as of this encounter: 68.7 kg (151 lb 8 oz).  Medication Reconciliation: complete  "

## 2019-07-08 NOTE — PROGRESS NOTES
SUBJECTIVE:   Aki Navarro is a 63 year old male who presents to clinic today for the following   health issues:      Diabetes Follow-up      How often are you checking your blood sugar? One time daily    What time of day are you checking your blood sugars (select all that apply)?  Before meals    Have you had any blood sugars above 200?  No    Have you had any blood sugars below 70?  No    What symptoms do you notice when your blood sugar is low?  None    What concerns do you have today about your diabetes? None     Do you have any of these symptoms? (Select all that apply)  Redness, sores, or blisters on feet     Have you had a diabetic eye exam in the last 12 months? Yes- Date of last eye exam: recently, going to check on this    Diabetes Management Resources    Hyperlipidemia Follow-Up      Are you having any of the following symptoms? (Select all that apply)  No complaints of shortness of breath, chest pain or pressure.  No increased sweating or nausea with activity.  No left-sided neck or arm pain.  No complaints of pain in calves when walking 1-2 blocks.    Are you regularly taking any medication or supplement to lower your cholesterol?   Yes- Zocor    Are you having muscle aches or other side effects that you think could be caused by your cholesterol lowering medication?  No    Hypertension Follow-up      Do you check your blood pressure regularly outside of the clinic? No     Are you following a low salt diet? No    Are your blood pressures ever more than 140 on the top number (systolic) OR more   than 90 on the bottom number (diastolic), for example 140/90? No    BP Readings from Last 2 Encounters:   07/08/19 122/62   04/25/19 131/84     Hemoglobin A1C (%)   Date Value   01/04/2019 7.1 (H)   05/24/2018 7.3 (H)     LDL Cholesterol Calculated (mg/dL)   Date Value   01/04/2019 37   05/24/2018 51       Amount of exercise or physical activity: 6-7 days/week for an average of 30-45 minutes    Problems taking  medications regularly: No    Medication side effects: none    Diet: regular (no restrictions)        Additional history: as documented    Reviewed  and updated as needed this visit by clinical staff  Tobacco  Allergies  Meds  Med Hx  Surg Hx  Fam Hx  Soc Hx        Reviewed and updated as needed this visit by Provider         Patient Active Problem List   Diagnosis     Advanced care planning/counseling discussion     Diabetes mellitus, type 2 (H)     Essential hypertension, benign     Hyperlipidemia     Mental retardation     Arthritis     Past Surgical History:   Procedure Laterality Date     MOUTH SURGERY  2008     undescended testicle surgery         Social History     Tobacco Use     Smoking status: Never Smoker     Smokeless tobacco: Never Used   Substance Use Topics     Alcohol use: Yes     Comment: 1 beer occasionally     Family History   Problem Relation Age of Onset     Cancer Mother         brain cancer - cause of death     Diabetes Mother 60     Cancer Father 76        renal cancer - cause of death     Diabetes Sister 50         Current Outpatient Medications   Medication Sig Dispense Refill     ACETAMINOPHEN PO Take 325 mg by mouth as needed for pain (4-6 hours)        ALL DAY ALLERGY 10 MG tablet TAKE ONE (1) TABLET BY MOUTH DAILY 30 tablet 11     blood glucose (EASY STEP TEST) test strip Use to test blood sugar 2 times daily or as directed due to uncontrolled blood glucose readings 60 strip 11     chlorhexidine (PERIDEX) 0.12 % solution RINSE WITH 15 MLS ORALLY TWICE DAILY 473 mL 11     EASY TOUCH LANCETS 32G/TWIST MISC 1 lancet 2 times daily , use to test blood glucose twice daily for uncontrolled blood glucose 100 each 3     glipiZIDE (GLUCOTROL XL) 5 MG 24 hr tablet Take 1 tablet (5 mg) by mouth daily 30 tablet 0     metFORMIN (GLUCOPHAGE) 1000 MG tablet TAKE ONE (1) TABLET BY MOUTH TWICE DAILY 60 tablet 0     Multiple Vitamins-Minerals (CENTROVITE) TABS TAKE ONE (1) TABLET BY MOUTH DAILY 130  tablet 1     mupirocin (BACTROBAN) 2 % external cream Apply topically 3 times daily 15 g 0     order for DME Equipment being ordered: order for DME   Sig: Equipment being ordered: Diabetic shoes dx: DM type 2, controlled, last visit 2.18.18 , poor circulation as comorbidity 1 Device 0     order for DME Equipment being ordered: Diabetic shoes dx: DM type 2, controlled, last visit 7/24/15, poor circulation as comorbidity 1 Device 0     ORDER FOR DME Equipment being ordered: Wheeled walker with seat    Patient has frequent falls, and need to rest often (needs seat) 1 Device 0     ramipril (ALTACE) 5 MG capsule TAKE ONE (1) CAPSULE BY MOUTH DAILY 30 capsule 8     ranitidine (ZANTAC) 300 MG tablet TAKE ONE (1) TABLET BY MOUTH DAILY 30 tablet 1     simvastatin (ZOCOR) 10 MG tablet TAKE ONE (1) TABLET BY MOUTH DAILY 30 tablet 0     triamcinolone (KENALOG) 0.1 % external cream APPLY TO AFFECTED AREA(S) TOPICALLY TWICE DAILY 80 g 0     Allergies   Allergen Reactions     Tape [Adhesive Tape]        ROS:  Constitutional, HEENT, cardiovascular, pulmonary, gi and gu systems are negative, except as otherwise noted.    OBJECTIVE:                                                    /62 (BP Location: Left arm, Patient Position: Sitting, Cuff Size: Adult Regular)   Pulse 84   Temp 97.1  F (36.2  C) (Tympanic)   Wt 68.7 kg (151 lb 8 oz)   SpO2 97%   BMI 27.71 kg/m    Body mass index is 27.71 kg/m .  GENERAL APPEARANCE: healthy, alert and no distress  SKIN: abrasion to top of right foot, no signs of infection, redressed with 2x2 and tegaderm  PSYCH: affect normal/bright         ASSESSMENT/PLAN:                                                    1. Type 2 diabetes mellitus with hyperglycemia, without long-term current use of insulin (H)  Labs updated.  Pharmacy sent recommendation that if HgbA1c above 7.0%, change glipizide to Januvia, and I agree to that recommendation.  Follow-up in 6 months, sooner as needed.  - Albumin  Random Urine Quantitative with Creat Ratio  - Hemoglobin A1c  - TSH    2. Hyperlipidemia, unspecified hyperlipidemia type  Labs updated.  - ALT  - Lipid Profile    3. Essential hypertension, benign  Labs updated.  - Basic metabolic panel  - CBC with platelets      Follow up with Provider - 6 months, sooner as needed.       Nicole Rojas MD  [facility]

## 2019-07-31 DIAGNOSIS — Z00.00 HEALTHCARE MAINTENANCE: ICD-10-CM

## 2019-07-31 RX ORDER — CHLORHEXIDINE GLUCONATE ORAL RINSE 1.2 MG/ML
SOLUTION DENTAL
Qty: 473 ML | Refills: 11 | Status: SHIPPED | OUTPATIENT
Start: 2019-07-31 | End: 2020-03-06

## 2019-07-31 NOTE — TELEPHONE ENCOUNTER
Chlorhexidine 0.12% solution      Last Written Prescription Date:  10/30/2018  Last Fill Quantity: 473mL,   # refills: 11  Last Office Visit: 7/8/2019  Future Office visit:       Routing refill request to provider for review/approval because:  Drug not on the [facility], [facility] or [facility] refill protocol or controlled substance

## 2019-08-07 DIAGNOSIS — E78.2 MIXED HYPERLIPIDEMIA: ICD-10-CM

## 2019-08-07 DIAGNOSIS — E11.9 TYPE 2 DIABETES MELLITUS WITHOUT COMPLICATION, WITHOUT LONG-TERM CURRENT USE OF INSULIN (H): ICD-10-CM

## 2019-08-07 RX ORDER — GLIPIZIDE 5 MG/1
TABLET, FILM COATED, EXTENDED RELEASE ORAL
Qty: 30 TABLET | Refills: 0 | Status: SHIPPED | OUTPATIENT
Start: 2019-08-07 | End: 2019-09-09

## 2019-08-07 RX ORDER — SIMVASTATIN 10 MG
TABLET ORAL
Qty: 30 TABLET | Refills: 0 | Status: SHIPPED | OUTPATIENT
Start: 2019-08-07 | End: 2019-09-09

## 2019-08-13 DIAGNOSIS — L23.7 CONTACT DERMATITIS DUE TO POISON IVY: ICD-10-CM

## 2019-08-14 RX ORDER — TRIAMCINOLONE ACETONIDE 1 MG/G
CREAM TOPICAL
Qty: 80 G | Refills: 2 | Status: SHIPPED | OUTPATIENT
Start: 2019-08-14 | End: 2019-10-08

## 2019-08-21 DIAGNOSIS — L50.9 HIVES: ICD-10-CM

## 2019-08-27 DIAGNOSIS — L50.9 HIVES: ICD-10-CM

## 2019-08-28 RX ORDER — CETIRIZINE HYDROCHLORIDE 10 MG/1
TABLET ORAL
Qty: 30 TABLET | Refills: 2 | Status: SHIPPED | OUTPATIENT
Start: 2019-08-28 | End: 2019-10-23

## 2019-09-09 DIAGNOSIS — E11.9 TYPE 2 DIABETES MELLITUS WITHOUT COMPLICATION, WITHOUT LONG-TERM CURRENT USE OF INSULIN (H): ICD-10-CM

## 2019-09-09 DIAGNOSIS — E78.2 MIXED HYPERLIPIDEMIA: ICD-10-CM

## 2019-09-11 RX ORDER — GLIPIZIDE 5 MG/1
TABLET, FILM COATED, EXTENDED RELEASE ORAL
Qty: 30 TABLET | Refills: 0 | Status: SHIPPED | OUTPATIENT
Start: 2019-09-11 | End: 2019-09-24

## 2019-09-11 RX ORDER — SIMVASTATIN 10 MG
TABLET ORAL
Qty: 30 TABLET | Refills: 5 | Status: SHIPPED | OUTPATIENT
Start: 2019-09-11 | End: 2020-02-11

## 2019-09-14 ENCOUNTER — HOSPITAL ENCOUNTER (EMERGENCY)
Facility: HOSPITAL | Age: 64
Discharge: HOME | End: 2019-09-14
Attending: PHYSICIAN ASSISTANT | Admitting: PHYSICIAN ASSISTANT
Payer: MEDICARE

## 2019-09-14 VITALS
TEMPERATURE: 97.4 F | OXYGEN SATURATION: 98 % | SYSTOLIC BLOOD PRESSURE: 136 MMHG | DIASTOLIC BLOOD PRESSURE: 76 MMHG | RESPIRATION RATE: 18 BRPM

## 2019-09-14 DIAGNOSIS — H10.9 CONJUNCTIVITIS: ICD-10-CM

## 2019-09-14 PROCEDURE — G0463 HOSPITAL OUTPT CLINIC VISIT: HCPCS

## 2019-09-14 PROCEDURE — 99213 OFFICE O/P EST LOW 20 MIN: CPT | Mod: Z6 | Performed by: PHYSICIAN ASSISTANT

## 2019-09-14 RX ORDER — TOBRAMYCIN 3 MG/ML
1-2 SOLUTION/ DROPS OPHTHALMIC
Qty: 9 ML | Refills: 0 | Status: SHIPPED | OUTPATIENT
Start: 2019-09-14 | End: 2019-10-25

## 2019-09-14 ASSESSMENT — ENCOUNTER SYMPTOMS
EYE DISCHARGE: 1
EYE PAIN: 0
COUGH: 0
FEVER: 0
EYE ITCHING: 0
EYE REDNESS: 1

## 2019-09-14 NOTE — ED AVS SNAPSHOT
[facility]  [address]  [place] [address]  Phone:  [phone]                                    Aki Navarro   MRN: [id number]    Department:  [facility]   Date of Visit:  9/14/2019           After Visit Summary Signature Page    I have received my discharge instructions, and my questions have been answered. I have discussed any challenges I see with this plan with the nurse or doctor.    ..........................................................................................................................................  Patient/Patient Representative Signature      ..........................................................................................................................................  Patient Representative Print Name and Relationship to Patient    ..................................................               ................................................  Date                                   Time    ..........................................................................................................................................  Reviewed by Signature/Title    ...................................................              ..............................................  Date                                               Time          22EPIC Rev 08/18

## 2019-09-14 NOTE — ED PROVIDER NOTES
History     Chief Complaint   Patient presents with     Facial Swelling     right eye red and swollen, woke this am with symptoms      HPI  Aki Navarro is a 63 year old male who presents to urgent care with  for evaluation bilateral eye redness.   noted some swelling of patient's right eyelid this morning along with some mild crusting.  Patient lives in a group home with 4 other adults.  Patient denies any vision changes, pain, cold symptoms, fevers, or any other associated symptoms at this time.    Allergies:  Allergies   Allergen Reactions     Tape [Adhesive Tape]        Problem List:    Patient Active Problem List    Diagnosis Date Noted     Arthritis 09/24/2013     Priority: Medium     Advanced care planning/counseling discussion 09/19/2012     Priority: Medium     Essential hypertension, benign 01/11/2011     Priority: Medium     Hyperlipidemia 01/11/2011     Priority: Medium     Mental retardation 01/11/2011     Priority: Medium     Diabetes mellitus, type 2 (H) 04/28/2005     Priority: Medium        Past Medical History:    Past Medical History:   Diagnosis Date     Allergic rhinitis, cause unspecified 1/11/2011     Arthritis 9/24/2013     Essential hypertension, benign 1/11/2011     Impacted cerumen of both ears 10/12/2012     Other and unspecified hyperlipidemia 1/11/2011     Type II or unspecified type diabetes mellitus without mention of complication 4/28/2005     Unspecified intellectual disabilities 1/11/2011       Past Surgical History:    Past Surgical History:   Procedure Laterality Date     MOUTH SURGERY  2008     undescended testicle surgery         Family History:    Family History   Problem Relation Age of Onset     Cancer Mother         brain cancer - cause of death     Diabetes Mother 60     Cancer Father 76        renal cancer - cause of death     Diabetes Sister 50       Social History:  Marital Status:  Single [1]  Social History     Tobacco Use     Smoking status:  Never Smoker     Smokeless tobacco: Never Used   Substance Use Topics     Alcohol use: Yes     Comment: 1 beer occasionally     Drug use: No        Medications:      blood glucose (EASY STEP TEST) test strip   cetirizine (ZYRTEC) 10 MG tablet   chlorhexidine (PERIDEX) 0.12 % solution   EASY TOUCH LANCETS 32G/TWIST MISC   glipiZIDE (GLUCOTROL XL) 5 MG 24 hr tablet   metFORMIN (GLUCOPHAGE) 1000 MG tablet   Multiple Vitamins-Minerals (CENTROVITE) TABS   order for DME   order for DME   ORDER FOR DME   ramipril (ALTACE) 5 MG capsule   ranitidine (ZANTAC) 300 MG tablet   simvastatin (ZOCOR) 10 MG tablet   tobramycin (TOBREX) 0.3 % ophthalmic solution   triamcinolone (KENALOG) 0.1 % external cream   ACETAMINOPHEN PO   mupirocin (BACTROBAN) 2 % external cream         Review of Systems   Constitutional: Negative for fever.   HENT: Negative for congestion.    Eyes: Positive for discharge and redness. Negative for pain and itching.   Respiratory: Negative for cough.        Physical Exam   BP: 136/76  Heart Rate: 88  Temp: 97.4  F (36.3  C)  Resp: 18  SpO2: 98 %      Physical Exam   Constitutional: He is oriented to person, place, and time. He appears well-developed and well-nourished. No distress.   Eyes: Pupils are equal, round, and reactive to light. EOM are normal. Right eye exhibits discharge. Right conjunctiva is injected. Left conjunctiva is injected.   Mild swelling and erythema noted around right eye   Neck: Normal range of motion.   Cardiovascular: Regular rhythm and normal heart sounds.   Pulmonary/Chest: Effort normal and breath sounds normal. No respiratory distress.   Lymphadenopathy:     He has no cervical adenopathy.   Neurological: He is alert and oriented to person, place, and time.   Nursing note and vitals reviewed.      ED Course        Procedures              Critical Care time:               No results found for this or any previous visit (from the past 24 hour(s)).    Medications - No data to  display    Assessments & Plan (with Medical Decision Making)   #1.  Conjunctivitis    Discussed exam findings with patient and .  Patient is prescribed Tobrex ophthalmic drops.  Proper hand hygiene is discussed.  Any further concerns please return to urgent care or follow-up with primary care provider.   verbalizes understanding and agreement of plan.      I have reviewed the nursing notes.    I have reviewed the findings, diagnosis, plan and need for follow up with the patient.      Discharge Medication List as of 9/14/2019 10:10 AM      START taking these medications    Details   tobramycin (TOBREX) 0.3 % ophthalmic solution Place 1-2 drops into both eyes every 2 hours for 7 days, Disp-9 mL, R-0, E-Prescribe             Final diagnoses:   Conjunctivitis       9/14/2019   [facility]     Patel Ureña PA-C  09/14/19 [id number]

## 2019-09-24 DIAGNOSIS — E11.9 TYPE 2 DIABETES MELLITUS WITHOUT COMPLICATION, WITHOUT LONG-TERM CURRENT USE OF INSULIN (H): ICD-10-CM

## 2019-09-26 RX ORDER — GLIPIZIDE 5 MG/1
TABLET, FILM COATED, EXTENDED RELEASE ORAL
Qty: 30 TABLET | Refills: 11 | Status: SHIPPED | OUTPATIENT
Start: 2019-09-26 | End: 2020-10-06

## 2019-09-26 NOTE — TELEPHONE ENCOUNTER
Glipizide       Last Written Prescription Date:  9/11/2019  Last Fill Quantity: 30,   # refills: 0  Last Office Visit: 7/8/2019  Future Office visit:       Routing refill request to provider for review/approval because:  Failed protocol due to last Microalbumin last drawn more than 15 months ago on 2/8/2018 Lab is pending Please advise     Metfomin      Last Written Prescription Date:  9/11/2019  Last Fill Quantity: 60,   # refills: 0  Last Office Visit: 7/8/2019  Future Office visit:       Routing refill request to provider for review/approval because:  Failed protocol due to last Microalbumin last drawn more than 15 months ago on 2/8/2018 Lab is pending Please advise

## 2019-10-15 DIAGNOSIS — Z00.00 HEALTHCARE MAINTENANCE: ICD-10-CM

## 2019-10-15 RX ORDER — MULTIVITAMIN/IRON/FOLIC ACID 18MG-0.4MG
TABLET ORAL
Qty: 130 TABLET | Refills: 1 | Status: SHIPPED | OUTPATIENT
Start: 2019-10-15 | End: 2020-07-15

## 2019-10-15 NOTE — TELEPHONE ENCOUNTER
Multiple Vitamins-Minerals (CENTROVITE) TABS  Last Written Prescription Date:  1/8/19  Last Fill Quantity: 130,   # refills: 1  Last Office Visit: 7/8/19  Future Office visit:

## 2019-10-18 ENCOUNTER — TELEPHONE (OUTPATIENT)
Dept: FAMILY MEDICINE | Facility: OTHER | Age: 64
End: 2019-10-18

## 2019-10-18 DIAGNOSIS — M19.90 ARTHRITIS: Primary | ICD-10-CM

## 2019-10-18 DIAGNOSIS — F79 INTELLECTUAL DISABILITY: ICD-10-CM

## 2019-10-18 NOTE — TELEPHONE ENCOUNTER
Patient will need appointment for [facility]2F to document need for walker (not addressed at most recent visit)

## 2019-10-18 NOTE — TELEPHONE ENCOUNTER
Order pending.    When we fax this back to [facility] they are requesting that a set of progress notes for the need of the walker are sent as well please.

## 2019-10-23 DIAGNOSIS — I10 HTN (HYPERTENSION): ICD-10-CM

## 2019-10-23 DIAGNOSIS — L50.9 HIVES: ICD-10-CM

## 2019-10-24 NOTE — PROGRESS NOTES
Subjective     Aki Navarro is a 63 year old male who presents to clinic today for the following health issues:    HPI   Face to Face:  Walker      Duration: Several years    Description (location/character/radiation): Walker    Intensity:  none    Accompanying signs and symptoms: pt needs a walker to stability    History (similar episodes/previous evaluation): Has had a walker for several years    Precipitating or alleviating factors: None    Therapies tried and outcome: None    Patient does have arthritis, he does have problems with his balance and gait abnormality       Face to Face:  Diabetic shoes and boots      Duration: had for several years    Description (location/character/radiation): diabetic shoes and boots    Intensity:  none    Accompanying signs and symptoms: need them for protection    History (similar episodes/previous evaluation): Has had for several years    Precipitating or alleviating factors: None    Therapies tried and outcome: shoes and boots, have been useful in the past and would like to get new pairs    Patient has had a history of ulcerated wound on top of his left foot, he is a candidate for diabetic shoes and boots.       Patient Active Problem List   Diagnosis     Advanced care planning/counseling discussion     Diabetes mellitus, type 2 (H)     Essential hypertension, benign     Hyperlipidemia     Mental retardation     Arthritis     Past Surgical History:   Procedure Laterality Date     MOUTH SURGERY  2008     undescended testicle surgery         Social History     Tobacco Use     Smoking status: Never Smoker     Smokeless tobacco: Never Used   Substance Use Topics     Alcohol use: Yes     Comment: 1 beer occasionally     Family History   Problem Relation Age of Onset     Cancer Mother         brain cancer - cause of death     Diabetes Mother 60     Cancer Father 76        renal cancer - cause of death     Diabetes Sister 50         Current Outpatient Medications   Medication Sig  Dispense Refill     ACETAMINOPHEN PO Take 325 mg by mouth as needed for pain (4-6 hours)        blood glucose (EASY STEP TEST) test strip Use to test blood sugar 2 times daily or as directed due to uncontrolled blood glucose readings 60 strip 11     chlorhexidine (PERIDEX) 0.12 % solution RINSE WITH 15 MLS ORALLY TWICE DAILY 473 mL 11     EASY TOUCH LANCETS 32G/TWIST MISC 1 lancet 2 times daily , use to test blood glucose twice daily for uncontrolled blood glucose 100 each 3     glipiZIDE (GLUCOTROL XL) 5 MG 24 hr tablet Take 1 tablet (5 mg) by mouth daily 30 tablet 11     metFORMIN (GLUCOPHAGE) 1000 MG tablet TAKE ONE (1) TABLET BY MOUTH TWICE DAILY 60 tablet 11     Multiple Vitamins-Minerals (CENTROVITE) TABS TAKE ONE (1) TABLET BY MOUTH DAILY 130 tablet 1     mupirocin (BACTROBAN) 2 % external cream Apply topically 3 times daily 15 g 0     order for DME Equipment being ordered: Diabetic shoes dx: DM type 2, controlled, history of pressure wound 1 Device 0     order for DME Equipment being ordered: Wheeled walker with seat    Patient has frequent falls, and need to rest often (needs seat) 1 Device 0     order for DME Equipment being ordered: 4 wheeled Walker with Seat 1 Units 0     order for DME Equipment being ordered: order for DME   Sig: Equipment being ordered: Diabetic shoes dx: DM type 2, controlled, last visit 2.18.18 , poor circulation as comorbidity 1 Device 0     ranitidine (ZANTAC) 300 MG tablet TAKE ONE (1) TABLET BY MOUTH DAILY 30 tablet 8     simvastatin (ZOCOR) 10 MG tablet TAKE ONE (1) TABLET BY MOUTH DAILY 30 tablet 5     triamcinolone (KENALOG) 0.1 % external cream APPLY TO AFFECTED AREA(S) TOPICALLY TWICE DAILY 80 g 0     cetirizine (ZYRTEC) 10 MG tablet TAKE ONE (1) TABLET BY MOUTH DAILY 30 tablet 4     ramipril (ALTACE) 5 MG capsule TAKE ONE (1) CAPSULE BY MOUTH DAILY 30 capsule 4     Allergies   Allergen Reactions     Tape [Adhesive Tape]          Reviewed and updated as needed this visit by  Provider         Review of Systems   ROS COMP: Constitutional, HEENT, cardiovascular, pulmonary, gi and gu systems are negative, except as otherwise noted.      Objective    /66 (BP Location: Right arm, Patient Position: Sitting, Cuff Size: Adult Regular)   Pulse 83   Temp 97.1  F (36.2  C) (Tympanic)   Resp 18   Wt 70.6 kg (155 lb 9.6 oz)   SpO2 96%   BMI 28.46 kg/m    Body mass index is 28.46 kg/m .  Physical Exam   GENERAL: healthy, alert and no distress  PSYCH: mentation appears normal, affect normal/bright    Diagnostic Test Results:  none         Assessment & Plan     1. Arthritis  Script printed and documentation provided for face to face visit.  Follow-up for regularly scheduled visits.  - order for DME; Equipment being ordered: Wheeled walker with seat    Patient has frequent falls, and need to rest often (needs seat)  Dispense: 1 Device; Refill: 0    2. Balance problems  As above.    3. Gait abnormality  As above.    4. Type 2 diabetes mellitus without complication, without long-term current use of insulin (H)  Patient does need diabetic shoes due to history of wound on foot.  Follow-up here as directed.  - order for DME; Equipment being ordered: Diabetic shoes dx: DM type 2, controlled, history of pressure wound  Dispense: 1 Device; Refill: 0         Return in about 3 months (around 1/25/2020) for Diabetic Follow-up.    Nicole Rojas MD  [facility]

## 2019-10-25 ENCOUNTER — OFFICE VISIT (OUTPATIENT)
Dept: FAMILY MEDICINE | Facility: OTHER | Age: 64
End: 2019-10-25
Attending: FAMILY MEDICINE
Payer: MEDICARE

## 2019-10-25 ENCOUNTER — MEDICAL CORRESPONDENCE (OUTPATIENT)
Dept: HEALTH INFORMATION MANAGEMENT | Facility: CLINIC | Age: 64
End: 2019-10-25

## 2019-10-25 VITALS
OXYGEN SATURATION: 96 % | HEART RATE: 83 BPM | TEMPERATURE: 97.1 F | BODY MASS INDEX: 28.46 KG/M2 | SYSTOLIC BLOOD PRESSURE: 126 MMHG | WEIGHT: 155.6 LBS | RESPIRATION RATE: 18 BRPM | DIASTOLIC BLOOD PRESSURE: 66 MMHG

## 2019-10-25 DIAGNOSIS — M19.90 ARTHRITIS: Primary | ICD-10-CM

## 2019-10-25 DIAGNOSIS — R26.9 GAIT ABNORMALITY: ICD-10-CM

## 2019-10-25 DIAGNOSIS — R26.89 BALANCE PROBLEMS: ICD-10-CM

## 2019-10-25 DIAGNOSIS — E11.9 TYPE 2 DIABETES MELLITUS WITHOUT COMPLICATION, WITHOUT LONG-TERM CURRENT USE OF INSULIN (H): ICD-10-CM

## 2019-10-25 PROCEDURE — G0463 HOSPITAL OUTPT CLINIC VISIT: HCPCS

## 2019-10-25 PROCEDURE — 99214 OFFICE O/P EST MOD 30 MIN: CPT | Performed by: FAMILY MEDICINE

## 2019-10-25 RX ORDER — CETIRIZINE HYDROCHLORIDE 10 MG/1
TABLET ORAL
Qty: 30 TABLET | Refills: 4 | Status: SHIPPED | OUTPATIENT
Start: 2019-10-25 | End: 2020-03-31

## 2019-10-25 RX ORDER — RAMIPRIL 5 MG/1
CAPSULE ORAL
Qty: 30 CAPSULE | Refills: 4 | Status: SHIPPED | OUTPATIENT
Start: 2019-10-25 | End: 2020-03-24

## 2019-10-25 ASSESSMENT — PAIN SCALES - GENERAL: PAINLEVEL: NO PAIN (0)

## 2019-10-25 NOTE — NURSING NOTE
"Chief Complaint   Patient presents with     Patient Request       Initial /66 (BP Location: Right arm, Patient Position: Sitting, Cuff Size: Adult Regular)   Pulse 83   Temp 97.1  F (36.2  C) (Tympanic)   Resp 18   Wt 70.6 kg (155 lb 9.6 oz)   SpO2 96%   BMI 28.46 kg/m   Estimated body mass index is 28.46 kg/m  as calculated from the following:    Height as of 5/24/18: 1.575 m (5' 2\").    Weight as of this encounter: 70.6 kg (155 lb 9.6 oz).  Medication Reconciliation: complete  Marylou Weldon MA  "

## 2019-11-22 ENCOUNTER — MEDICAL CORRESPONDENCE (OUTPATIENT)
Dept: HEALTH INFORMATION MANAGEMENT | Facility: CLINIC | Age: 64
End: 2019-11-22

## 2019-12-24 DIAGNOSIS — L23.7 CONTACT DERMATITIS DUE TO POISON IVY: ICD-10-CM

## 2019-12-24 RX ORDER — TRIAMCINOLONE ACETONIDE 1 MG/G
CREAM TOPICAL
Qty: 80 G | Refills: 0 | OUTPATIENT
Start: 2019-12-24

## 2020-02-10 ENCOUNTER — HEALTH MAINTENANCE LETTER (OUTPATIENT)
Age: 65
End: 2020-02-10

## 2020-02-11 DIAGNOSIS — E78.2 MIXED HYPERLIPIDEMIA: ICD-10-CM

## 2020-02-11 RX ORDER — SIMVASTATIN 10 MG
TABLET ORAL
Qty: 90 TABLET | Refills: 0 | Status: SHIPPED | OUTPATIENT
Start: 2020-02-11 | End: 2020-05-26

## 2020-02-11 NOTE — PROGRESS NOTES
3  SUBJECTIVE:   CC: Aki Navarro is an 64 year old male who presents for preventive health visit.     Healthy Habits:    Do you get at least three servings of calcium containing foods daily (dairy, green leafy vegetables, etc.)? yes    Amount of exercise or daily activities, outside of work: 7 day(s) per week    Problems taking medications regularly No    Medication side effects: No    Have you had an eye exam in the past two years? yes    Do you see a dentist twice per year? yes    Do you have sleep apnea, excessive snoring or daytime drowsiness?no      Diabetes Follow-up    How often are you checking your blood sugar? One time daily  What time of day are you checking your blood sugars (select all that apply)?  Before meals  Have you had any blood sugars above 200?  No  Have you had any blood sugars below 70?  No    What symptoms do you notice when your blood sugar is low?  No lows    What concerns do you have today about your diabetes? None     Do you have any of these symptoms? (Select all that apply)  No numbness or tingling in feet.  No redness, sores or blisters on feet.  No complaints of excessive thirst.  No reports of blurry vision.  No significant changes to weight.    Have you had a diabetic eye exam in the last 12 months? No, recommended      Hyperlipidemia Follow-Up      Are you regularly taking any medication or supplement to lower your cholesterol?   Yes- simvastatin    Are you having muscle aches or other side effects that you think could be caused by your cholesterol lowering medication?  No    Hypertension Follow-up      Do you check your blood pressure regularly outside of the clinic? No     Are you following a low salt diet? Yes    Are your blood pressures ever more than 140 on the top number (systolic) OR more   than 90 on the bottom number (diastolic), for example 140/90? No    BP Readings from Last 2 Encounters:   02/12/20 118/66   10/25/19 126/66     Hemoglobin A1C (%)   Date Value    02/12/2020 6.9 (H)   07/08/2019 6.7 (H)     LDL Cholesterol Calculated (mg/dL)   Date Value   02/12/2020 21   07/08/2019 32         Today's PHQ-2 Score: No flowsheet data found.    Abuse: Current or Past(Physical, Sexual or Emotional)- No  Do you feel safe in your environment? Yes        Social History     Tobacco Use     Smoking status: Never Smoker     Smokeless tobacco: Never Used   Substance Use Topics     Alcohol use: Yes     Comment: 1 beer occasionally     If you drink alcohol do you typically have >3 drinks per day or >7 drinks per week? No                      Last PSA:   PSA   Date Value Ref Range Status   02/08/2018 1.20 0 - 4 ug/L Final     Comment:     Assay Method:  Chemiluminescence using Siemens Vista analyzer       Reviewed orders with patient. Reviewed health maintenance and updated orders accordingly - Yes  Patient Active Problem List   Diagnosis     Advanced care planning/counseling discussion     Diabetes mellitus, type 2 (H)     Essential hypertension, benign     Hyperlipidemia     Mental retardation     Arthritis     Past Surgical History:   Procedure Laterality Date     MOUTH SURGERY  2008     undescended testicle surgery         Social History     Tobacco Use     Smoking status: Never Smoker     Smokeless tobacco: Never Used   Substance Use Topics     Alcohol use: Yes     Comment: 1 beer occasionally     Family History   Problem Relation Age of Onset     Cancer Mother         brain cancer - cause of death     Diabetes Mother 60     Cancer Father 76        renal cancer - cause of death     Diabetes Sister 50         Current Outpatient Medications   Medication Sig Dispense Refill     ACETAMINOPHEN PO Take 325 mg by mouth as needed for pain (4-6 hours)        blood glucose (EASY STEP TEST) test strip Use to test blood sugar 2 times daily or as directed due to uncontrolled blood glucose readings 60 strip 11     cetirizine (ZYRTEC) 10 MG tablet TAKE ONE (1) TABLET BY MOUTH DAILY 30 tablet 4      chlorhexidine (PERIDEX) 0.12 % solution RINSE WITH 15 MLS ORALLY TWICE DAILY 473 mL 11     EASY TOUCH LANCETS 32G/TWIST MISC 1 lancet 2 times daily , use to test blood glucose twice daily for uncontrolled blood glucose 100 each 3     glipiZIDE (GLUCOTROL XL) 5 MG 24 hr tablet Take 1 tablet (5 mg) by mouth daily 30 tablet 11     metFORMIN (GLUCOPHAGE) 1000 MG tablet TAKE ONE (1) TABLET BY MOUTH TWICE DAILY 60 tablet 11     Multiple Vitamins-Minerals (CENTROVITE) TABS TAKE ONE (1) TABLET BY MOUTH DAILY 130 tablet 1     order for DME Equipment being ordered: Diabetic shoes dx: DM type 2, controlled, history of pressure wound 1 Device 0     order for DME Equipment being ordered: Wheeled walker with seat    Patient has frequent falls, and need to rest often (needs seat) 1 Device 0     order for DME Equipment being ordered: 4 wheeled Walker with Seat 1 Units 0     order for DME Equipment being ordered: order for DME   Sig: Equipment being ordered: Diabetic shoes dx: DM type 2, controlled, last visit 2.18.18 , poor circulation as comorbidity 1 Device 0     ramipril (ALTACE) 5 MG capsule TAKE ONE (1) CAPSULE BY MOUTH DAILY 30 capsule 4     ranitidine (ZANTAC) 300 MG tablet TAKE ONE (1) TABLET BY MOUTH DAILY 30 tablet 8     simvastatin (ZOCOR) 10 MG tablet TAKE ONE (1) TABLET BY MOUTH DAILY 90 tablet 0     triamcinolone (KENALOG) 0.1 % external cream APPLY TO AFFECTED AREA(S) TOPICALLY TWICE DAILY 80 g 0     mupirocin (BACTROBAN) 2 % external cream Apply topically 3 times daily (Patient not taking: Reported on 2/12/2020) 15 g 0     Allergies   Allergen Reactions     Tape [Adhesive Tape]        Reviewed and updated as needed this visit by clinical staff  Tobacco  Allergies  Meds  Problems  Med Hx  Surg Hx  Fam Hx         Reviewed and updated as needed this visit by Provider  Tobacco  Allergies  Meds  Problems  Med Hx  Surg Hx  Fam Hx            ROS:  CONSTITUTIONAL: NEGATIVE for fever, chills, change in  weight  INTEGUMENTARY/SKIN: NEGATIVE for worrisome rashes, moles or lesions  EYES: NEGATIVE for vision changes or irritation  ENT: NEGATIVE for ear, mouth and throat problems  RESP: NEGATIVE for significant cough or SOB  CV: NEGATIVE for chest pain, palpitations or peripheral edema  GI: NEGATIVE for nausea, abdominal pain, heartburn, or change in bowel habits   male: negative for dysuria, hematuria, decreased urinary stream, erectile dysfunction, urethral discharge  MUSCULOSKELETAL: NEGATIVE for significant arthralgias or myalgia  NEURO: NEGATIVE for weakness, dizziness or paresthesias  PSYCHIATRIC: NEGATIVE for changes in mood or affect    OBJECTIVE:   /66 (BP Location: Right arm, Patient Position: Chair, Cuff Size: Adult Large)   Pulse 91   Temp 96.8  F (36  C) (Tympanic)   Wt 69.9 kg (154 lb)   SpO2 98%   BMI 28.17 kg/m    EXAM:  GENERAL: alert and no distress  EYES: Eyes grossly normal to inspection, PERRL and conjunctivae and sclerae normal  HENT: ear canals and TM's normal, nose and mouth without ulcers or lesions  NECK: no adenopathy  RESP: lungs clear to auscultation - no rales, rhonchi or wheezes  CV: regular rate and rhythm, normal S1 S2, no S3 or S4, no murmur, click or rub, no peripheral edema and peripheral pulses strong  ABDOMEN: soft, nontender, no hepatosplenomegaly, no masses and bowel sounds normal  MS: no gross musculoskeletal defects noted, no edema  SKIN: no suspicious lesions or rashes  PSYCH: affect normal/bright    Diagnostic Test Results:  See orders    ASSESSMENT/PLAN:       ICD-10-CM    1. Routine general medical examination at a health care facility Z00.00 Estimated Average Glucose     Estimated Average Glucose   2. Mental retardation F79    3. Type 2 diabetes mellitus with hyperglycemia, without long-term current use of insulin (H) E11.65 Hemoglobin A1c     Albumin Random Urine Quantitative with Creat Ratio     CANCELED: Albumin Random Urine Quantitative with Creat Ratio  "  4. Hyperlipidemia, unspecified hyperlipidemia type E78.5 ALT     Lipid Profile   5. Essential hypertension, benign I10 Basic metabolic panel       COUNSELING:  Reviewed preventive health counseling, as reflected in patient instructions    Estimated body mass index is 28.17 kg/m  as calculated from the following:    Height as of 5/24/18: 1.575 m (5' 2\").    Weight as of this encounter: 69.9 kg (154 lb).         reports that he has never smoked. He has never used smokeless tobacco.      Counseling Resources:  ATP IV Guidelines  Pooled Cohorts Equation Calculator  FRAX Risk Assessment  ICSI Preventive Guidelines  Dietary Guidelines for Americans, 2010  USDA's MyPlate  ASA Prophylaxis  Lung CA Screening    Nicole Rojas MD  [facility] [place]  "

## 2020-02-12 ENCOUNTER — OFFICE VISIT (OUTPATIENT)
Dept: FAMILY MEDICINE | Facility: OTHER | Age: 65
End: 2020-02-12
Attending: FAMILY MEDICINE
Payer: MEDICARE

## 2020-02-12 VITALS
HEART RATE: 91 BPM | SYSTOLIC BLOOD PRESSURE: 118 MMHG | DIASTOLIC BLOOD PRESSURE: 66 MMHG | WEIGHT: 154 LBS | TEMPERATURE: 96.8 F | BODY MASS INDEX: 28.17 KG/M2 | OXYGEN SATURATION: 98 %

## 2020-02-12 DIAGNOSIS — E78.5 HYPERLIPIDEMIA, UNSPECIFIED HYPERLIPIDEMIA TYPE: ICD-10-CM

## 2020-02-12 DIAGNOSIS — I10 ESSENTIAL HYPERTENSION, BENIGN: ICD-10-CM

## 2020-02-12 DIAGNOSIS — E11.65 TYPE 2 DIABETES MELLITUS WITH HYPERGLYCEMIA, WITHOUT LONG-TERM CURRENT USE OF INSULIN (H): ICD-10-CM

## 2020-02-12 DIAGNOSIS — Z00.00 ROUTINE GENERAL MEDICAL EXAMINATION AT A HEALTH CARE FACILITY: Primary | ICD-10-CM

## 2020-02-12 DIAGNOSIS — F79 INTELLECTUAL DISABILITY: ICD-10-CM

## 2020-02-12 PROCEDURE — 36415 COLL VENOUS BLD VENIPUNCTURE: CPT | Mod: ZL | Performed by: FAMILY MEDICINE

## 2020-02-12 PROCEDURE — 40000788 ZZHCL STATISTIC ESTIMATED AVERAGE GLUCOSE: Mod: ZL | Performed by: FAMILY MEDICINE

## 2020-02-12 PROCEDURE — G0463 HOSPITAL OUTPT CLINIC VISIT: HCPCS

## 2020-02-12 PROCEDURE — 83036 HEMOGLOBIN GLYCOSYLATED A1C: CPT | Mod: ZL | Performed by: FAMILY MEDICINE

## 2020-02-12 PROCEDURE — 80048 BASIC METABOLIC PNL TOTAL CA: CPT | Mod: ZL | Performed by: FAMILY MEDICINE

## 2020-02-12 PROCEDURE — 80061 LIPID PANEL: CPT | Mod: ZL | Performed by: FAMILY MEDICINE

## 2020-02-12 PROCEDURE — 84460 ALANINE AMINO (ALT) (SGPT): CPT | Mod: ZL | Performed by: FAMILY MEDICINE

## 2020-02-12 PROCEDURE — 99396 PREV VISIT EST AGE 40-64: CPT | Performed by: FAMILY MEDICINE

## 2020-02-12 ASSESSMENT — PAIN SCALES - GENERAL: PAINLEVEL: NO PAIN (0)

## 2020-02-12 NOTE — NURSING NOTE
"Chief Complaint   Patient presents with     Physical       Initial /66 (BP Location: Right arm, Patient Position: Chair, Cuff Size: Adult Large)   Pulse 91   Temp 96.8  F (36  C) (Tympanic)   Wt 69.9 kg (154 lb)   SpO2 98%   BMI 28.17 kg/m   Estimated body mass index is 28.17 kg/m  as calculated from the following:    Height as of 5/24/18: 1.575 m (5' 2\").    Weight as of this encounter: 69.9 kg (154 lb).  Medication Reconciliation: complete     Lisseth Peres LPN    "

## 2020-02-13 LAB
ALT SERPL W P-5'-P-CCNC: 37 U/L (ref 0–70)
ANION GAP SERPL CALCULATED.3IONS-SCNC: 8 MMOL/L (ref 3–14)
BUN SERPL-MCNC: 21 MG/DL (ref 7–30)
CALCIUM SERPL-MCNC: 8.6 MG/DL (ref 8.5–10.1)
CHLORIDE SERPL-SCNC: 104 MMOL/L (ref 94–109)
CHOLEST SERPL-MCNC: 112 MG/DL
CO2 SERPL-SCNC: 25 MMOL/L (ref 20–32)
CREAT SERPL-MCNC: 0.9 MG/DL (ref 0.66–1.25)
EST. AVERAGE GLUCOSE BLD GHB EST-MCNC: 151 MG/DL
GFR SERPL CREATININE-BSD FRML MDRD: >90 ML/MIN/{1.73_M2}
GLUCOSE SERPL-MCNC: 82 MG/DL (ref 70–99)
HBA1C MFR BLD: 6.9 % (ref 0–5.6)
HDLC SERPL-MCNC: 32 MG/DL
LDLC SERPL CALC-MCNC: 21 MG/DL
NONHDLC SERPL-MCNC: 80 MG/DL
POTASSIUM SERPL-SCNC: 4.4 MMOL/L (ref 3.4–5.3)
SODIUM SERPL-SCNC: 137 MMOL/L (ref 133–144)
TRIGL SERPL-MCNC: 296 MG/DL

## 2020-03-03 DIAGNOSIS — Z00.00 HEALTHCARE MAINTENANCE: ICD-10-CM

## 2020-03-05 DIAGNOSIS — E11.65 TYPE 2 DIABETES MELLITUS WITH HYPERGLYCEMIA, WITHOUT LONG-TERM CURRENT USE OF INSULIN (H): ICD-10-CM

## 2020-03-05 LAB
CREAT UR-MCNC: 126 MG/DL
MICROALBUMIN UR-MCNC: 26 MG/L
MICROALBUMIN/CREAT UR: 20.87 MG/G CR (ref 0–17)

## 2020-03-05 PROCEDURE — 82043 UR ALBUMIN QUANTITATIVE: CPT | Mod: ZL | Performed by: FAMILY MEDICINE

## 2020-03-06 RX ORDER — CHLORHEXIDINE GLUCONATE ORAL RINSE 1.2 MG/ML
SOLUTION DENTAL
Qty: 473 ML | Refills: 11 | Status: SHIPPED | OUTPATIENT
Start: 2020-03-06 | End: 2020-12-22

## 2020-03-06 NOTE — TELEPHONE ENCOUNTER
Chlorhexidine (Peridex) 0.12% solution      Rinse with 15 mls orally twice daily  Last Written Prescription Date:  7-31-19  Last Fill Quantity: 473ml,   # refills: 11  Last Office Visit: 2-12-20  Future Office visit:

## 2020-03-10 RX ORDER — ALPRAZOLAM 1 MG/1
TABLET ORAL
Qty: 60 STRIP | Refills: 11 | Status: SHIPPED | OUTPATIENT
Start: 2020-03-10 | End: 2021-03-25

## 2020-03-17 DIAGNOSIS — L23.7 CONTACT DERMATITIS DUE TO POISON IVY: ICD-10-CM

## 2020-03-17 RX ORDER — TRIAMCINOLONE ACETONIDE 1 MG/G
CREAM TOPICAL
Qty: 80 G | Refills: 0 | Status: SHIPPED | OUTPATIENT
Start: 2020-03-17 | End: 2020-04-07

## 2020-03-24 DIAGNOSIS — I10 HTN (HYPERTENSION): ICD-10-CM

## 2020-03-24 RX ORDER — RAMIPRIL 5 MG/1
CAPSULE ORAL
Qty: 30 CAPSULE | Refills: 8 | Status: SHIPPED | OUTPATIENT
Start: 2020-03-24 | End: 2021-01-25

## 2020-04-07 DIAGNOSIS — L23.7 CONTACT DERMATITIS DUE TO POISON IVY: ICD-10-CM

## 2020-04-07 RX ORDER — TRIAMCINOLONE ACETONIDE 1 MG/G
CREAM TOPICAL
Qty: 80 G | Refills: 0 | Status: SHIPPED | OUTPATIENT
Start: 2020-04-07 | End: 2020-05-13

## 2020-04-07 NOTE — TELEPHONE ENCOUNTER
Triamcinolone      Last Written Prescription Date:  3-17-20  Last Fill Quantity: 80g,   # refills: 0  Last Office Visit: 2-12-20  Future Office visit:

## 2020-05-19 DIAGNOSIS — L50.9 HIVES: Primary | ICD-10-CM

## 2020-05-21 RX ORDER — FAMOTIDINE 20 MG/1
TABLET, FILM COATED ORAL
Qty: 30 TABLET | Refills: 5 | OUTPATIENT
Start: 2020-05-21

## 2020-05-21 RX ORDER — CIMETIDINE 400 MG
400 TABLET ORAL 2 TIMES DAILY
Qty: 60 TABLET | Refills: 5 | Status: SHIPPED | OUTPATIENT
Start: 2020-05-21 | End: 2021-09-02

## 2020-05-21 NOTE — TELEPHONE ENCOUNTER
famotidine      Last Written Prescription Date:    Last Fill Quantity: ,   # refills:   Last Office Visit: 2/12/2020  Future Office visit:       Routing refill request to provider for review/approval because:  Drug not active on patient's medication list

## 2020-05-28 ENCOUNTER — TELEPHONE (OUTPATIENT)
Dept: FAMILY MEDICINE | Facility: OTHER | Age: 65
End: 2020-05-28

## 2020-05-28 NOTE — TELEPHONE ENCOUNTER
Received a PA from [employer] for Cimetidine 400 mg tablets. Submitted on CMM. Waiting for a response.

## 2020-05-29 NOTE — TELEPHONE ENCOUNTER
Received an APPROVAL from [employer] for Cimetidine 400 mg tablets. Effective 02/28/2020 to 05/28/2021. Forms scanned to [facility].

## 2020-06-17 DIAGNOSIS — L50.9 HIVES: Primary | ICD-10-CM

## 2020-06-17 RX ORDER — FAMOTIDINE 20 MG/1
TABLET, FILM COATED ORAL
COMMUNITY
Start: 2020-05-26 | End: 2020-11-16

## 2020-06-17 NOTE — TELEPHONE ENCOUNTER
pepcid      Last Written Prescription Date:  5.26.2020  Last Fill Quantity: 30,   # refills: 0  Last Office Visit: 2.12.2020

## 2020-06-18 RX ORDER — FAMOTIDINE 20 MG/1
TABLET, FILM COATED ORAL
Qty: 30 TABLET | Refills: 5 | Status: SHIPPED | OUTPATIENT
Start: 2020-06-18 | End: 2020-12-28

## 2020-06-23 DIAGNOSIS — L23.7 CONTACT DERMATITIS DUE TO POISON IVY: ICD-10-CM

## 2020-06-23 NOTE — TELEPHONE ENCOUNTER
triamcinolone      Last Written Prescription Date:  05/13/2020  Last Fill Quantity: 80g,   # refills: 0  Last Office Visit: 12/12/2020  Future Office visit:       Routing refill request to provider for review/approval because:

## 2020-06-24 RX ORDER — TRIAMCINOLONE ACETONIDE 1 MG/G
CREAM TOPICAL
Qty: 80 G | Refills: 0 | Status: SHIPPED | OUTPATIENT
Start: 2020-06-24 | End: 2020-07-23

## 2020-07-14 DIAGNOSIS — Z00.00 HEALTHCARE MAINTENANCE: ICD-10-CM

## 2020-07-15 RX ORDER — MULTIVITAMIN/IRON/FOLIC ACID 18MG-0.4MG
TABLET ORAL
Qty: 130 TABLET | Refills: 0 | Status: SHIPPED | OUTPATIENT
Start: 2020-07-15 | End: 2021-04-27

## 2020-07-21 DIAGNOSIS — L23.7 CONTACT DERMATITIS DUE TO POISON IVY: ICD-10-CM

## 2020-07-23 RX ORDER — TRIAMCINOLONE ACETONIDE 1 MG/G
CREAM TOPICAL
Qty: 80 G | Refills: 1 | Status: SHIPPED | OUTPATIENT
Start: 2020-07-23 | End: 2020-09-03

## 2020-07-31 DIAGNOSIS — E11.65 TYPE 2 DIABETES MELLITUS WITH HYPERGLYCEMIA, WITHOUT LONG-TERM CURRENT USE OF INSULIN (H): ICD-10-CM

## 2020-08-06 RX ORDER — LANCETS 32 GAUGE
1 EACH MISCELLANEOUS 2 TIMES DAILY
Qty: 100 EACH | Refills: 1 | Status: SHIPPED | OUTPATIENT
Start: 2020-08-06 | End: 2021-04-27

## 2020-08-20 ENCOUNTER — TRANSFERRED RECORDS (OUTPATIENT)
Dept: HEALTH INFORMATION MANAGEMENT | Facility: HOSPITAL | Age: 65
End: 2020-08-20
Payer: MEDICARE

## 2020-09-01 ENCOUNTER — TRANSFERRED RECORDS (OUTPATIENT)
Dept: HEALTH INFORMATION MANAGEMENT | Facility: CLINIC | Age: 65
End: 2020-09-01

## 2020-09-01 LAB — RETINOPATHY: NORMAL

## 2020-09-02 DIAGNOSIS — L23.7 CONTACT DERMATITIS DUE TO POISON IVY: ICD-10-CM

## 2020-09-02 DIAGNOSIS — E11.9 TYPE 2 DIABETES MELLITUS WITHOUT COMPLICATION, WITHOUT LONG-TERM CURRENT USE OF INSULIN (H): ICD-10-CM

## 2020-09-03 RX ORDER — TRIAMCINOLONE ACETONIDE 1 MG/G
CREAM TOPICAL
Qty: 80 G | Refills: 1 | Status: SHIPPED | OUTPATIENT
Start: 2020-09-03 | End: 2020-12-02

## 2020-09-03 NOTE — TELEPHONE ENCOUNTER
metformin 1,000 mg tablet     Biguanide Agents Failed    Patient has documented A1c within the specified period of time.    Lab Results   Component Value Date    A1C 6.9 02/12/2020    A1C 6.7 07/08/2019    A1C 7.1 01/04/2019    A1C 7.3 05/24/2018    A1C 6.9 02/08/2018         Recent (6 mo) or future (30 days) visit within the authorizing provider's specialty

## 2020-09-03 NOTE — TELEPHONE ENCOUNTER
Kenalog 0.1 %    Last Written Prescription Date:  7/23/20  Last Fill Quantity: 80 g,   # refills: 1  Last Office Visit: 2/12/20  Future Office visit:       Routing refill request to provider for review/approval because:      Metformin 1000 mg   Last Written Prescription Date:  9/26/19  Last Fill Quantity: 60,   # refills: 11  Last Office Visit: 2/12/20  Future Office visit:       Routing refill request to provider for review/approval because:

## 2020-09-09 DIAGNOSIS — E78.2 MIXED HYPERLIPIDEMIA: ICD-10-CM

## 2020-09-09 NOTE — TELEPHONE ENCOUNTER
ZOCOR      Last Written Prescription Date:  5-  Last Fill Quantity: 90,   # refills: 0  Last Office Visit: 2-  Future Office visit:

## 2020-09-10 RX ORDER — SIMVASTATIN 10 MG
TABLET ORAL
Qty: 90 TABLET | Refills: 1 | Status: SHIPPED | OUTPATIENT
Start: 2020-09-10 | End: 2021-02-22

## 2020-10-05 DIAGNOSIS — E11.9 TYPE 2 DIABETES MELLITUS WITHOUT COMPLICATION, WITHOUT LONG-TERM CURRENT USE OF INSULIN (H): ICD-10-CM

## 2020-10-06 RX ORDER — GLIPIZIDE 5 MG/1
TABLET, FILM COATED, EXTENDED RELEASE ORAL
Qty: 30 TABLET | Refills: 5 | Status: SHIPPED | OUTPATIENT
Start: 2020-10-06 | End: 2021-03-23

## 2020-10-06 NOTE — TELEPHONE ENCOUNTER
Glipizide  Last Written Prescription Date: 9/26/19  Last Fill Quantity: 30 # of Refills: 11  Last Office Visit: 2/12/20

## 2020-11-10 NOTE — PROGRESS NOTES
Subjective     Aki Navarro is a 65 year old male who presents to clinic today for the following health issues:    HPI         Diabetes Follow-up    How often are you checking your blood sugar? One time daily  What time of day are you checking your blood sugars (select all that apply)?  Before meals  Have you had any blood sugars above 200?  Yes a few times  Have you had any blood sugars below 70?  No    What symptoms do you notice when your blood sugar is low?  None    What concerns do you have today about your diabetes? None     Do you have any of these symptoms? (Select all that apply)  No numbness or tingling in feet.  No redness, sores or blisters on feet.  No complaints of excessive thirst.  No reports of blurry vision.  No significant changes to weight.    Have you had a diabetic eye exam in the last 12 months? Yes- Date of last eye exam: 09/20,  Location: in [place]      Hyperlipidemia Follow-Up      Are you regularly taking any medication or supplement to lower your cholesterol?   Yes- Zocor    Are you having muscle aches or other side effects that you think could be caused by your cholesterol lowering medication?  No    Hypertension Follow-up      Do you check your blood pressure regularly outside of the clinic? No     Are you following a low salt diet? Yes    Are your blood pressures ever more than 140 on the top number (systolic) OR more   than 90 on the bottom number (diastolic), for example 140/90? No    BP Readings from Last 2 Encounters:   11/16/20 110/64   02/12/20 118/66     Hemoglobin A1C (%)   Date Value   11/16/2020 6.4 (H)   02/12/2020 6.9 (H)     LDL Cholesterol Calculated (mg/dL)   Date Value   11/16/2020 47   02/12/2020 21         How many servings of fruits and vegetables do you eat daily?  2-3    On average, how many sweetened beverages do you drink each day (Examples: soda, juice, sweet tea, etc.  Do NOT count diet or artificially sweetened beverages)?   0    How many days per week do  you exercise enough to make your heart beat faster? 7    How many minutes a day do you exercise enough to make your heart beat faster? 30 - 60    How many days per week do you miss taking your medication? 0      Patient Active Problem List   Diagnosis     Advanced care planning/counseling discussion     Diabetes mellitus, type 2 (H)     Essential hypertension, benign     Hyperlipidemia     Mental retardation     Arthritis     Past Surgical History:   Procedure Laterality Date     MOUTH SURGERY  2008     undescended testicle surgery         Social History     Tobacco Use     Smoking status: Never Smoker     Smokeless tobacco: Never Used   Substance Use Topics     Alcohol use: Yes     Comment: 1 beer occasionally     Family History   Problem Relation Age of Onset     Cancer Mother         brain cancer - cause of death     Diabetes Mother 60     Cancer Father 76        renal cancer - cause of death     Diabetes Sister 50           Current Outpatient Medications   Medication Sig Dispense Refill     ACETAMINOPHEN PO Take 325 mg by mouth as needed for pain (4-6 hours)        cetirizine (ZYRTEC) 10 MG tablet TAKE ONE (1) TABLET BY MOUTH DAILY 30 tablet 4     chlorhexidine (PERIDEX) 0.12 % solution RINSE WITH 15 MLS ORALLY TWICE DAILY 473 mL 11     cimetidine (TAGAMET) 400 MG tablet Take 1 tablet (400 mg) by mouth 2 times daily 60 tablet 5     Easy Touch Lancets 32G/Twist MISC 1 lancet 2 times daily, use to test blood glucose twice daily for uncontrolled blood glucose 100 each 1     EASY TOUCH TEST test strip Use to test blood sugar 2 times daily or as directed due to uncontrolled blood glucose readings 60 strip 11     famotidine (PEPCID) 20 MG tablet TAKE 1 TABLET BY MOUTH DAILY 30 tablet 5     glipiZIDE (GLUCOTROL XL) 5 MG 24 hr tablet Take 1 tablet (5 mg) by mouth daily 30 tablet 5     metFORMIN (GLUCOPHAGE) 1000 MG tablet TAKE ONE (1) TABLET BY MOUTH TWICE DAILY 60 tablet 5     Multiple Vitamins-Minerals (CENTROVITE)  TABS TAKE ONE (1) TABLET BY MOUTH DAILY 130 tablet 0     mupirocin (BACTROBAN) 2 % external cream Apply topically 3 times daily 15 g 0     order for DME Equipment being ordered: Diabetic shoes dx: DM type 2, controlled, history of pressure wound 1 Device 0     order for DME Equipment being ordered: Wheeled walker with seat    Patient has frequent falls, and need to rest often (needs seat) 1 Device 0     order for DME Equipment being ordered: 4 wheeled Walker with Seat 1 Units 0     order for DME Equipment being ordered: order for DME   Sig: Equipment being ordered: Diabetic shoes dx: DM type 2, controlled, last visit 2.18.18 , poor circulation as comorbidity 1 Device 0     ramipril (ALTACE) 5 MG capsule TAKE ONE (1) CAPSULE BY MOUTH DAILY 30 capsule 8     simvastatin (ZOCOR) 10 MG tablet TAKE ONE (1) TABLET BY MOUTH DAILY 90 tablet 1     triamcinolone (KENALOG) 0.1 % external cream APPLY TO AFFECTED AREA(S) TOPICALLY TWICE DAILY 80 g 1     Allergies   Allergen Reactions     Tape [Adhesive Tape]        Family History, Social History, Tobacco Use are all reviewed and updated      Review of Systems   Constitutional, HEENT, cardiovascular, pulmonary, gi and gu systems are negative, except as otherwise noted.      Objective    /64 (BP Location: Right arm, Patient Position: Sitting, Cuff Size: Adult Regular)   Pulse 89   Temp 96.8  F (36  C) (Tympanic)   Resp 16   Wt 66.7 kg (147 lb)   SpO2 96%   BMI 26.89 kg/m    Body mass index is 26.89 kg/m .  Physical Exam   GENERAL: healthy, alert and no distress  SKIN: mild dermatitis of lower legs  PSYCH: affect normal/bright          Assessment & Plan     1. Type 2 diabetes mellitus with hyperglycemia, without long-term current use of insulin (H)  Labs updated, staff notes mild elevation recently due to birthday and sweets.  Follow-up in six months, sooner as needed.  - Hemoglobin A1c  - TSH with free T4 reflex  - Estimated Average Glucose    2. Hyperlipidemia,  unspecified hyperlipidemia type  Labs updated.  - ALT  - Lipid Profile    3. Essential hypertension, benign  Labs updated.  - Basic metabolic panel  - CBC with platelets           Return in about 6 months (around 5/16/2021) for Diabetic Follow-up, Chronic Disease Management, Medication review.    Nicole Rojas MD  [facility]

## 2020-11-14 ENCOUNTER — HEALTH MAINTENANCE LETTER (OUTPATIENT)
Age: 65
End: 2020-11-14

## 2020-11-16 ENCOUNTER — OFFICE VISIT (OUTPATIENT)
Dept: FAMILY MEDICINE | Facility: OTHER | Age: 65
End: 2020-11-16
Attending: FAMILY MEDICINE
Payer: MEDICARE

## 2020-11-16 VITALS
HEART RATE: 89 BPM | BODY MASS INDEX: 26.89 KG/M2 | OXYGEN SATURATION: 96 % | SYSTOLIC BLOOD PRESSURE: 110 MMHG | WEIGHT: 147 LBS | TEMPERATURE: 96.8 F | RESPIRATION RATE: 16 BRPM | DIASTOLIC BLOOD PRESSURE: 64 MMHG

## 2020-11-16 DIAGNOSIS — I10 ESSENTIAL HYPERTENSION, BENIGN: ICD-10-CM

## 2020-11-16 DIAGNOSIS — E11.65 TYPE 2 DIABETES MELLITUS WITH HYPERGLYCEMIA, WITHOUT LONG-TERM CURRENT USE OF INSULIN (H): Primary | ICD-10-CM

## 2020-11-16 DIAGNOSIS — E78.5 HYPERLIPIDEMIA, UNSPECIFIED HYPERLIPIDEMIA TYPE: ICD-10-CM

## 2020-11-16 LAB
ALT SERPL W P-5'-P-CCNC: 28 U/L (ref 0–70)
ANION GAP SERPL CALCULATED.3IONS-SCNC: 4 MMOL/L (ref 3–14)
BUN SERPL-MCNC: 20 MG/DL (ref 7–30)
CALCIUM SERPL-MCNC: 9.1 MG/DL (ref 8.5–10.1)
CHLORIDE SERPL-SCNC: 103 MMOL/L (ref 94–109)
CHOLEST SERPL-MCNC: 118 MG/DL
CO2 SERPL-SCNC: 28 MMOL/L (ref 20–32)
CREAT SERPL-MCNC: 0.89 MG/DL (ref 0.66–1.25)
ERYTHROCYTE [DISTWIDTH] IN BLOOD BY AUTOMATED COUNT: 13 % (ref 10–15)
EST. AVERAGE GLUCOSE BLD GHB EST-MCNC: 137 MG/DL
GFR SERPL CREATININE-BSD FRML MDRD: >90 ML/MIN/{1.73_M2}
GLUCOSE SERPL-MCNC: 145 MG/DL (ref 70–99)
HBA1C MFR BLD: 6.4 % (ref 0–5.6)
HCT VFR BLD AUTO: 41.7 % (ref 40–53)
HDLC SERPL-MCNC: 50 MG/DL
HGB BLD-MCNC: 14.2 G/DL (ref 13.3–17.7)
LDLC SERPL CALC-MCNC: 47 MG/DL
MCH RBC QN AUTO: 29.8 PG (ref 26.5–33)
MCHC RBC AUTO-ENTMCNC: 34.1 G/DL (ref 31.5–36.5)
MCV RBC AUTO: 88 FL (ref 78–100)
NONHDLC SERPL-MCNC: 68 MG/DL
PLATELET # BLD AUTO: 333 10E9/L (ref 150–450)
POTASSIUM SERPL-SCNC: 4.5 MMOL/L (ref 3.4–5.3)
RBC # BLD AUTO: 4.76 10E12/L (ref 4.4–5.9)
SODIUM SERPL-SCNC: 135 MMOL/L (ref 133–144)
TRIGL SERPL-MCNC: 103 MG/DL
TSH SERPL DL<=0.005 MIU/L-ACNC: 2.76 MU/L (ref 0.4–4)
WBC # BLD AUTO: 6.1 10E9/L (ref 4–11)

## 2020-11-16 PROCEDURE — G0463 HOSPITAL OUTPT CLINIC VISIT: HCPCS

## 2020-11-16 PROCEDURE — 99214 OFFICE O/P EST MOD 30 MIN: CPT | Performed by: FAMILY MEDICINE

## 2020-11-16 PROCEDURE — 80048 BASIC METABOLIC PNL TOTAL CA: CPT | Mod: ZL | Performed by: FAMILY MEDICINE

## 2020-11-16 PROCEDURE — 85027 COMPLETE CBC AUTOMATED: CPT | Mod: ZL | Performed by: FAMILY MEDICINE

## 2020-11-16 PROCEDURE — 84443 ASSAY THYROID STIM HORMONE: CPT | Mod: ZL | Performed by: FAMILY MEDICINE

## 2020-11-16 PROCEDURE — 84460 ALANINE AMINO (ALT) (SGPT): CPT | Mod: ZL | Performed by: FAMILY MEDICINE

## 2020-11-16 PROCEDURE — 36415 COLL VENOUS BLD VENIPUNCTURE: CPT | Mod: ZL | Performed by: FAMILY MEDICINE

## 2020-11-16 PROCEDURE — 83036 HEMOGLOBIN GLYCOSYLATED A1C: CPT | Mod: ZL | Performed by: FAMILY MEDICINE

## 2020-11-16 PROCEDURE — 80061 LIPID PANEL: CPT | Mod: ZL | Performed by: FAMILY MEDICINE

## 2020-11-16 PROCEDURE — 999N001182 HC STATISTIC ESTIMATED AVERAGE GLUCOSE: Mod: ZL | Performed by: FAMILY MEDICINE

## 2020-11-16 NOTE — NURSING NOTE
"Chief Complaint   Patient presents with     Diabetes     Lipids     Hypertension       Initial /64 (BP Location: Right arm, Patient Position: Sitting, Cuff Size: Adult Regular)   Pulse 89   Temp 96.8  F (36  C) (Tympanic)   Resp 16   Wt 66.7 kg (147 lb)   SpO2 96%   BMI 26.89 kg/m   Estimated body mass index is 26.89 kg/m  as calculated from the following:    Height as of 5/24/18: 1.575 m (5' 2\").    Weight as of this encounter: 66.7 kg (147 lb).  Medication Reconciliation: complete  Marylou Weldon MA  "

## 2020-11-17 DIAGNOSIS — K21.9 GASTROESOPHAGEAL REFLUX DISEASE, UNSPECIFIED WHETHER ESOPHAGITIS PRESENT: Primary | ICD-10-CM

## 2020-11-17 RX ORDER — FAMOTIDINE 40 MG/1
40 TABLET, FILM COATED ORAL DAILY
Qty: 30 TABLET | Refills: 11 | Status: SHIPPED | OUTPATIENT
Start: 2020-11-17 | End: 2020-12-28

## 2020-12-01 DIAGNOSIS — L23.7 CONTACT DERMATITIS DUE TO POISON IVY: ICD-10-CM

## 2020-12-02 RX ORDER — TRIAMCINOLONE ACETONIDE 1 MG/G
CREAM TOPICAL
Qty: 80 G | Refills: 1 | Status: SHIPPED | OUTPATIENT
Start: 2020-12-02 | End: 2021-01-22

## 2020-12-21 DIAGNOSIS — Z00.00 HEALTHCARE MAINTENANCE: ICD-10-CM

## 2020-12-21 DIAGNOSIS — L50.9 HIVES: ICD-10-CM

## 2020-12-22 RX ORDER — CETIRIZINE HYDROCHLORIDE 10 MG/1
TABLET ORAL
Qty: 30 TABLET | Refills: 11 | Status: SHIPPED | OUTPATIENT
Start: 2020-12-22 | End: 2021-12-21

## 2020-12-22 RX ORDER — CHLORHEXIDINE GLUCONATE ORAL RINSE 1.2 MG/ML
SOLUTION DENTAL
Qty: 473 ML | Refills: 11 | Status: SHIPPED | OUTPATIENT
Start: 2020-12-22 | End: 2021-09-15

## 2020-12-22 NOTE — TELEPHONE ENCOUNTER
chlorhexidine (PERIDEX) 0.12 % solution     Last Written Prescription Date:  3/6/20  Last Fill Quantity: 473 mL,   # refills: 11  Last Office Visit: 11/16/20  Future Office visit:       Routing refill request to provider for review/approval

## 2020-12-26 DIAGNOSIS — K21.9 GASTROESOPHAGEAL REFLUX DISEASE, UNSPECIFIED WHETHER ESOPHAGITIS PRESENT: ICD-10-CM

## 2020-12-28 RX ORDER — FAMOTIDINE 40 MG/1
40 TABLET, FILM COATED ORAL DAILY
Qty: 30 TABLET | Refills: 11 | Status: SHIPPED | OUTPATIENT
Start: 2020-12-28 | End: 2021-03-05

## 2020-12-28 NOTE — TELEPHONE ENCOUNTER
Please call facility - patient should be taking 40 mg once daily or 20 mg twice daily, which would they prefer?

## 2020-12-28 NOTE — TELEPHONE ENCOUNTER
Pepcid  Last Written Prescription Date: 11/17/20  Last Fill Quantity: 30 # of Refills: 11  Last Office Visit: 11/16/20

## 2021-01-22 DIAGNOSIS — L23.7 CONTACT DERMATITIS DUE TO POISON IVY: ICD-10-CM

## 2021-01-22 RX ORDER — TRIAMCINOLONE ACETONIDE 1 MG/G
CREAM TOPICAL
Qty: 80 G | Refills: 1 | Status: SHIPPED | OUTPATIENT
Start: 2021-01-22 | End: 2021-03-23

## 2021-01-22 NOTE — TELEPHONE ENCOUNTER
triamcinolone (KENALOG) 0.1 % external cream     Last Written Prescription Date:  12/2/20  Last Fill Quantity: 80 g,   # refills: 1  Last Office Visit: 11/16/20  Future Office visit:       Routing refill request to provider for review/approval

## 2021-01-25 DIAGNOSIS — I10 HTN (HYPERTENSION): ICD-10-CM

## 2021-01-25 RX ORDER — RAMIPRIL 5 MG/1
CAPSULE ORAL
Qty: 30 CAPSULE | Refills: 3 | Status: SHIPPED | OUTPATIENT
Start: 2021-01-25 | End: 2021-05-26

## 2021-02-14 ENCOUNTER — MYC MEDICAL ADVICE (OUTPATIENT)
Dept: FAMILY MEDICINE | Facility: OTHER | Age: 66
End: 2021-02-14

## 2021-02-14 DIAGNOSIS — Z12.11 COLON CANCER SCREENING: Primary | ICD-10-CM

## 2021-02-22 DIAGNOSIS — E78.2 MIXED HYPERLIPIDEMIA: ICD-10-CM

## 2021-02-22 RX ORDER — SIMVASTATIN 10 MG
TABLET ORAL
Qty: 90 TABLET | Refills: 1 | Status: SHIPPED | OUTPATIENT
Start: 2021-02-22 | End: 2021-08-24

## 2021-02-22 NOTE — TELEPHONE ENCOUNTER
Simvastatin 10 mg  Last Visit 11/16/20  Last Fill 9/10/20  Next Visit not scheduled    Thank you

## 2021-03-05 DIAGNOSIS — K21.9 GASTROESOPHAGEAL REFLUX DISEASE, UNSPECIFIED WHETHER ESOPHAGITIS PRESENT: ICD-10-CM

## 2021-03-05 RX ORDER — FAMOTIDINE 40 MG/1
40 TABLET, FILM COATED ORAL 2 TIMES DAILY
Qty: 60 TABLET | Refills: 11 | Status: SHIPPED | OUTPATIENT
Start: 2021-03-05 | End: 2021-03-12

## 2021-03-05 NOTE — TELEPHONE ENCOUNTER
pepcid 40 mg      Last Written Prescription Date:  12/28/20  Last Fill Quantity: 30,   # refills: 11  Last Office Visit: 11/16/20  Future Office visit:       Routing refill request to provider for review/approval because:

## 2021-03-23 DIAGNOSIS — E11.9 TYPE 2 DIABETES MELLITUS WITHOUT COMPLICATION, WITHOUT LONG-TERM CURRENT USE OF INSULIN (H): ICD-10-CM

## 2021-03-23 DIAGNOSIS — L23.7 CONTACT DERMATITIS DUE TO POISON IVY: ICD-10-CM

## 2021-03-23 RX ORDER — TRIAMCINOLONE ACETONIDE 1 MG/G
CREAM TOPICAL
Qty: 80 G | Refills: 1 | Status: SHIPPED | OUTPATIENT
Start: 2021-03-23 | End: 2021-04-19

## 2021-03-23 RX ORDER — GLIPIZIDE 5 MG/1
TABLET, FILM COATED, EXTENDED RELEASE ORAL
Qty: 30 TABLET | Refills: 5 | Status: SHIPPED | OUTPATIENT
Start: 2021-03-23 | End: 2021-08-26

## 2021-03-23 NOTE — TELEPHONE ENCOUNTER
Triamcinolone (KENALOG) 0.1%      Last Written Prescription Date:  1/22/2021  Last Fill Quantity: 80g,   # refills: 1  Last Office Visit: 11/16/2020  Future Office visit:       Routing refill request to provider for review/approval because:    glipiZIDE (GLUCOTROL XL) 5mg      Last Written Prescription Date:  10/6/2020  Last Fill Quantity: 30,   # refills: 5  Last Office Visit: 11/16/2020  Future Office visit:       Routing refill request to provider for review/approval because:    metFORMIN (GLUCOPHAGE) 1000mg      Last Written Prescription Date:  9/3/2020  Last Fill Quantity: 60,   # refills: 5  Last Office Visit: 11/16/2020  Future Office visit:       Routing refill request to provider for review/approval because:

## 2021-03-25 DIAGNOSIS — E11.65 TYPE 2 DIABETES MELLITUS WITH HYPERGLYCEMIA, WITHOUT LONG-TERM CURRENT USE OF INSULIN (H): ICD-10-CM

## 2021-03-25 RX ORDER — ALPRAZOLAM 1 MG/1
TABLET ORAL
Qty: 60 STRIP | Refills: 7 | Status: SHIPPED | OUTPATIENT
Start: 2021-03-25 | End: 2022-03-16

## 2021-03-28 ENCOUNTER — HEALTH MAINTENANCE LETTER (OUTPATIENT)
Age: 66
End: 2021-03-28

## 2021-04-19 DIAGNOSIS — L23.7 CONTACT DERMATITIS DUE TO POISON IVY: ICD-10-CM

## 2021-04-19 RX ORDER — TRIAMCINOLONE ACETONIDE 1 MG/G
CREAM TOPICAL
Qty: 80 G | Refills: 1 | Status: SHIPPED | OUTPATIENT
Start: 2021-04-19 | End: 2021-06-08

## 2021-04-27 DIAGNOSIS — E11.65 TYPE 2 DIABETES MELLITUS WITH HYPERGLYCEMIA, WITHOUT LONG-TERM CURRENT USE OF INSULIN (H): ICD-10-CM

## 2021-04-27 DIAGNOSIS — Z00.00 HEALTHCARE MAINTENANCE: ICD-10-CM

## 2021-04-27 RX ORDER — MULTIVITAMIN/IRON/FOLIC ACID 18MG-0.4MG
TABLET ORAL
Qty: 130 TABLET | Refills: 0 | Status: SHIPPED | OUTPATIENT
Start: 2021-04-27 | End: 2021-08-25

## 2021-04-27 RX ORDER — LANCETS 32 GAUGE
EACH MISCELLANEOUS
Qty: 100 EACH | Refills: 0 | Status: SHIPPED | OUTPATIENT
Start: 2021-04-27 | End: 2021-08-10

## 2021-05-23 ENCOUNTER — HEALTH MAINTENANCE LETTER (OUTPATIENT)
Age: 66
End: 2021-05-23

## 2021-06-07 DIAGNOSIS — L23.7 CONTACT DERMATITIS DUE TO POISON IVY: ICD-10-CM

## 2021-06-07 NOTE — TELEPHONE ENCOUNTER
Kenalog      Last Written Prescription Date:  04/19/21  Last Fill Quantity: 80g,   # refills: 1  Last Office Visit: 11/16/20  Future Office visit:       Routing refill request to provider for review/approval because:  Medication is reported/historical

## 2021-06-08 RX ORDER — TRIAMCINOLONE ACETONIDE 1 MG/G
CREAM TOPICAL
Qty: 80 G | Refills: 1 | Status: SHIPPED | OUTPATIENT
Start: 2021-06-08 | End: 2021-07-27

## 2021-08-10 DIAGNOSIS — E11.65 TYPE 2 DIABETES MELLITUS WITH HYPERGLYCEMIA, WITHOUT LONG-TERM CURRENT USE OF INSULIN (H): ICD-10-CM

## 2021-08-10 RX ORDER — LANCETS 32 GAUGE
EACH MISCELLANEOUS
Qty: 100 EACH | Refills: 1 | Status: SHIPPED | OUTPATIENT
Start: 2021-08-10 | End: 2021-12-09

## 2021-08-23 DIAGNOSIS — E78.2 MIXED HYPERLIPIDEMIA: ICD-10-CM

## 2021-08-24 ENCOUNTER — TRANSFERRED RECORDS (OUTPATIENT)
Dept: HEALTH INFORMATION MANAGEMENT | Facility: HOSPITAL | Age: 66
End: 2021-08-24
Payer: MEDICARE

## 2021-08-24 DIAGNOSIS — Z00.00 HEALTHCARE MAINTENANCE: ICD-10-CM

## 2021-08-24 DIAGNOSIS — E11.9 TYPE 2 DIABETES MELLITUS WITHOUT COMPLICATION, WITHOUT LONG-TERM CURRENT USE OF INSULIN (H): ICD-10-CM

## 2021-08-24 LAB — RETINOPATHY: NORMAL

## 2021-08-24 RX ORDER — SIMVASTATIN 10 MG
TABLET ORAL
Qty: 90 TABLET | Refills: 1 | Status: SHIPPED | OUTPATIENT
Start: 2021-08-24 | End: 2022-02-21

## 2021-08-25 RX ORDER — MULTIVITAMIN/IRON/FOLIC ACID 18MG-0.4MG
TABLET ORAL
Qty: 130 TABLET | Refills: 0 | Status: SHIPPED | OUTPATIENT
Start: 2021-08-25 | End: 2022-01-11

## 2021-08-25 NOTE — TELEPHONE ENCOUNTER
Failed protocol  Lab Results   Component Value Date    A1C 6.4 11/16/2020    A1C 6.9 02/12/2020    A1C 6.7 07/08/2019    A1C 7.1 01/04/2019    A1C 7.3 05/24/2018       glipiZIDE (GLUCOTROL XL) 5 MG 24 hr tablet      Last Written Prescription Date:  3/23/21  Last Fill Quantity: 30,   # refills: 5  Last Office Visit: 11/16/20  Future Office visit:    Next 5 appointments (look out 90 days)    Sep 02, 2021  9:00 AM  (Arrive by 8:45 AM)  PHYSICAL with Nicole Rojas MD  [facility] ([facility] ) [address]  [facility] [address]  [phone]           Routing refill request to provider for review/approval because:  Drug not on the [facility], [facility] or [facility] refill protocol or controlled substance

## 2021-08-26 RX ORDER — GLIPIZIDE 5 MG/1
TABLET, FILM COATED, EXTENDED RELEASE ORAL
Qty: 30 TABLET | Refills: 5 | Status: SHIPPED | OUTPATIENT
Start: 2021-08-26 | End: 2022-03-22

## 2021-09-01 NOTE — PROGRESS NOTES
SUBJECTIVE:   CC: Aki Navarro is an 65 year old male who presents for preventative health visit.     Patient has been advised of split billing requirements and indicates understanding: Yes     HPI  Ability to successfully perform activities of daily living: No, needs assistance with: telephone use, transportation, shopping, preparing meals, housework, bathing, laundry, money management and taking medicine  Home safety:  none identified   Hearing impairment: Yes, none        Diabetes Follow-up      What concerns do you have today about your diabetes? None     Do you have any of these symptoms? (Select all that apply)  No numbness or tingling in feet.  No redness, sores or blisters on feet.  No complaints of excessive thirst.  No reports of blurry vision.  No significant changes to weight.     Patient does not allow staff to examine his feet.  He does see Podiatry ([facility]) routinely.      Hyperlipidemia Follow-Up      Are you regularly taking any medication or supplement to lower your cholesterol?   Yes- simvastatin    Are you having muscle aches or other side effects that you think could be caused by your cholesterol lowering medication?  No    Hypertension Follow-up      Do you check your blood pressure regularly outside of the clinic? No     Are you following a low salt diet? No    Are your blood pressures ever more than 140 on the top number (systolic) OR more   than 90 on the bottom number (diastolic), for example 140/90? No    BP Readings from Last 2 Encounters:   09/02/21 126/66   11/16/20 110/64     Hemoglobin A1C (%)   Date Value   09/02/2021 6.0 (H)   11/16/2020 6.4 (H)   02/12/2020 6.9 (H)     LDL Cholesterol Calculated (mg/dL)   Date Value   09/02/2021 59   11/16/2020 47   02/12/2020 21         Today's PHQ-2 Score: No flowsheet data found.    Abuse: Current or Past(Physical, Sexual or Emotional)- No  Do you feel safe in your environment? Yes        Social History      Tobacco Use     Smoking status: Never Smoker     Smokeless tobacco: Never Used   Substance Use Topics     Alcohol use: Yes     Comment: 1 beer occasionally     If you drink alcohol do you typically have >3 drinks per day or >7 drinks per week? No    Alcohol Use 9/2/2021   Prescreen: >3 drinks/day or >7 drinks/week? No       Last PSA:   PSA   Date Value Ref Range Status   02/08/2018 1.20 0 - 4 ug/L Final     Comment:     Assay Method:  Chemiluminescence using Siemens Vista analyzer     Prostate Specific Antigen Screen   Date Value Ref Range Status   09/02/2021 1.65 0.00 - 4.00 ug/L Final       Reviewed orders with patient. Reviewed health maintenance and updated orders accordingly - Yes  Patient Active Problem List   Diagnosis     Advanced care planning/counseling discussion     Diabetes mellitus, type 2 (H)     Essential hypertension, benign     Hyperlipidemia     Mental retardation     Arthritis     Past Surgical History:   Procedure Laterality Date     MOUTH SURGERY  2008     undescended testicle surgery         Social History     Tobacco Use     Smoking status: Never Smoker     Smokeless tobacco: Never Used   Substance Use Topics     Alcohol use: Yes     Comment: 1 beer occasionally     Family History   Problem Relation Age of Onset     Cancer Mother         brain cancer - cause of death     Diabetes Mother 60     Cancer Father 76        renal cancer - cause of death     Diabetes Sister 50         Current Outpatient Medications   Medication Sig Dispense Refill     ACETAMINOPHEN PO Take 325 mg by mouth as needed for pain (4-6 hours)        cetirizine (ZYRTEC) 10 MG tablet TAKE ONE (1) TABLET BY MOUTH DAILY 30 tablet 11     chlorhexidine (PERIDEX) 0.12 % solution RINSE WITH 15 MLS ORALLY TWICE DAILY 473 mL 11     Easy Touch Lancets 32G/Twist MISC Use to test blood sugar twice daily 100 each 1     EASY TOUCH TEST test strip USE TO test blood sugar TWICE DAILY 60 strip 7     famotidine (PEPCID) 40 MG tablet Take  1 tablet (40 mg) by mouth daily 30 tablet 11     glipiZIDE (GLUCOTROL XL) 5 MG 24 hr tablet Take 1 tablet (5 mg) by mouth daily 30 tablet 5     metFORMIN (GLUCOPHAGE) 1000 MG tablet TAKE ONE (1) TABLET BY MOUTH TWICE DAILY 60 tablet 5     Multiple Vitamins-Minerals (CENTROVITE) TABS TAKE ONE (1) TABLET BY MOUTH DAILY 130 tablet 0     order for DME Equipment being ordered: Diabetic shoes dx: DM type 2, controlled, history of pressure wound 1 Device 0     order for DME Equipment being ordered: Wheeled walker with seat    Patient has frequent falls, and need to rest often (needs seat) 1 Device 0     order for DME Equipment being ordered: 4 wheeled Walker with Seat 1 Units 0     order for DME Equipment being ordered: order for DME   Sig: Equipment being ordered: Diabetic shoes dx: DM type 2, controlled, last visit 2.18.18 , poor circulation as comorbidity 1 Device 0     ramipril (ALTACE) 5 MG capsule TAKE ONE (1) CAPSULE BY MOUTH DAILY 30 capsule 3     simvastatin (ZOCOR) 10 MG tablet TAKE ONE (1) TABLET BY MOUTH DAILY 90 tablet 1     triamcinolone (KENALOG) 0.1 % external cream APPLY TO AFFECTED AREA(S) TOPICALLY TWICE DAILY 80 g 1     Allergies   Allergen Reactions     Tape [Adhesive Tape]        Reviewed and updated as needed this visit by clinical staff  Tobacco  Allergies  Meds  Problems  Med Hx  Surg Hx  Fam Hx  Soc Hx          Reviewed and updated as needed this visit by Provider  Tobacco  Allergies  Meds  Problems  Med Hx  Surg Hx  Fam Hx             Review of Systems  CONSTITUTIONAL: NEGATIVE for fever, chills, change in weight  INTEGUMENTARY/SKIN: NEGATIVE for worrisome rashes, moles or lesions  EYES: NEGATIVE for vision changes or irritation  ENT: NEGATIVE for ear, mouth and throat problems  RESP: NEGATIVE for significant cough or SOB  CV: NEGATIVE for chest pain, palpitations or peripheral edema  GI: NEGATIVE for nausea, abdominal pain, heartburn, or change in bowel habits   male: negative  "for dysuria, hematuria, decreased urinary stream, erectile dysfunction, urethral discharge  MUSCULOSKELETAL: NEGATIVE for significant arthralgias or myalgia  NEURO: NEGATIVE for weakness, dizziness or paresthesias  PSYCHIATRIC: NEGATIVE for changes in mood or affect    OBJECTIVE:   /66 (BP Location: Right arm, Patient Position: Sitting, Cuff Size: Adult Regular)   Pulse 75   Temp (!) 96.2  F (35.7  C) (Tympanic)   Resp 16   Ht 1.626 m (5' 4\")   Wt 65.7 kg (144 lb 14.4 oz)   SpO2 99%   BMI 24.87 kg/m      Physical Exam  GENERAL: healthy, alert and no distress  EYES: Eyes grossly normal to inspection, PERRL and conjunctivae and sclerae normal  NECK: no adenopathy  RESP: lungs clear to auscultation - no rales, rhonchi or wheezes  CV: regular rate and rhythm, normal S1 S2, no S3 or S4, no murmur, click or rub, no peripheral edema and peripheral pulses strong  ABDOMEN: soft, nontender, no hepatosplenomegaly, no masses and bowel sounds normal  MS: no gross musculoskeletal defects noted, no edema  PSYCH: affect normal    Diagnostic Test Results:  See orders    ASSESSMENT/PLAN:       ICD-10-CM    1. Routine general medical examination at a health care facility  Z00.00 Albumin Random Urine Quantitative with Creat Ratio     Albumin Random Urine Quantitative with Creat Ratio   2. Mental retardation  F79    3. Type 2 diabetes mellitus with hyperglycemia, without long-term current use of insulin (H)  E11.65 Hemoglobin A1c     TSH with free T4 reflex     Hemoglobin A1c     TSH with free T4 reflex   4. Hyperlipidemia, unspecified hyperlipidemia type  E78.5 ALT     Lipid Profile     ALT     Lipid Profile   5. Essential hypertension, benign  I10 Basic metabolic panel     CBC with platelets     Basic metabolic panel     CBC with platelets   6. Colon cancer screening  Z12.11 COLOGUARD([employer])   7. Screening for malignant neoplasm of prostate  Z12.5 PSA, screen     PSA, screen       Patient has been advised of " "split billing requirements and indicates understanding: Yes  COUNSELING:   Reviewed preventive health counseling, as reflected in patient instructions       Colon cancer screening       Prostate cancer screening    Estimated body mass index is 24.87 kg/m  as calculated from the following:    Height as of this encounter: 1.626 m (5' 4\").    Weight as of this encounter: 65.7 kg (144 lb 14.4 oz).         He reports that he has never smoked. He has never used smokeless tobacco.      Counseling Resources:  ATP IV Guidelines  Pooled Cohorts Equation Calculator  FRAX Risk Assessment  ICSI Preventive Guidelines  Dietary Guidelines for Americans, 2010  USDA's MyPlate  ASA Prophylaxis  Lung CA Screening    Nicole Rojas MD  [facility]  "

## 2021-09-02 ENCOUNTER — OFFICE VISIT (OUTPATIENT)
Dept: FAMILY MEDICINE | Facility: OTHER | Age: 66
End: 2021-09-02
Attending: FAMILY MEDICINE
Payer: MEDICARE

## 2021-09-02 VITALS
BODY MASS INDEX: 24.74 KG/M2 | DIASTOLIC BLOOD PRESSURE: 66 MMHG | RESPIRATION RATE: 16 BRPM | HEIGHT: 64 IN | TEMPERATURE: 96.2 F | HEART RATE: 75 BPM | OXYGEN SATURATION: 99 % | SYSTOLIC BLOOD PRESSURE: 126 MMHG | WEIGHT: 144.9 LBS

## 2021-09-02 DIAGNOSIS — Z12.11 COLON CANCER SCREENING: ICD-10-CM

## 2021-09-02 DIAGNOSIS — F79 INTELLECTUAL DISABILITY: ICD-10-CM

## 2021-09-02 DIAGNOSIS — I10 ESSENTIAL HYPERTENSION, BENIGN: ICD-10-CM

## 2021-09-02 DIAGNOSIS — E78.5 HYPERLIPIDEMIA, UNSPECIFIED HYPERLIPIDEMIA TYPE: ICD-10-CM

## 2021-09-02 DIAGNOSIS — Z00.00 ROUTINE GENERAL MEDICAL EXAMINATION AT A HEALTH CARE FACILITY: Primary | ICD-10-CM

## 2021-09-02 DIAGNOSIS — Z12.5 SCREENING FOR MALIGNANT NEOPLASM OF PROSTATE: ICD-10-CM

## 2021-09-02 DIAGNOSIS — E11.65 TYPE 2 DIABETES MELLITUS WITH HYPERGLYCEMIA, WITHOUT LONG-TERM CURRENT USE OF INSULIN (H): ICD-10-CM

## 2021-09-02 LAB
ALT SERPL W P-5'-P-CCNC: 29 U/L (ref 0–70)
ANION GAP SERPL CALCULATED.3IONS-SCNC: 6 MMOL/L (ref 3–14)
BUN SERPL-MCNC: 22 MG/DL (ref 7–30)
CALCIUM SERPL-MCNC: 8.8 MG/DL (ref 8.5–10.1)
CHLORIDE BLD-SCNC: 108 MMOL/L (ref 94–109)
CHOLEST SERPL-MCNC: 130 MG/DL
CO2 SERPL-SCNC: 26 MMOL/L (ref 20–32)
CREAT SERPL-MCNC: 0.82 MG/DL (ref 0.66–1.25)
CREAT UR-MCNC: 109 MG/DL
ERYTHROCYTE [DISTWIDTH] IN BLOOD BY AUTOMATED COUNT: 13 % (ref 10–15)
EST. AVERAGE GLUCOSE BLD GHB EST-MCNC: 126 MG/DL
FASTING STATUS PATIENT QL REPORTED: YES
GFR SERPL CREATININE-BSD FRML MDRD: >90 ML/MIN/1.73M2
GLUCOSE BLD-MCNC: 78 MG/DL (ref 70–99)
HBA1C MFR BLD: 6 % (ref 0–5.6)
HCT VFR BLD AUTO: 38.9 % (ref 40–53)
HDLC SERPL-MCNC: 50 MG/DL
HGB BLD-MCNC: 13.2 G/DL (ref 13.3–17.7)
LDLC SERPL CALC-MCNC: 59 MG/DL
MCH RBC QN AUTO: 30.8 PG (ref 26.5–33)
MCHC RBC AUTO-ENTMCNC: 33.9 G/DL (ref 31.5–36.5)
MCV RBC AUTO: 91 FL (ref 78–100)
MICROALBUMIN UR-MCNC: 65 MG/L
MICROALBUMIN/CREAT UR: 59.63 MG/G CR (ref 0–17)
NONHDLC SERPL-MCNC: 80 MG/DL
PLATELET # BLD AUTO: 277 10E3/UL (ref 150–450)
POTASSIUM BLD-SCNC: 4.4 MMOL/L (ref 3.4–5.3)
PSA SERPL-MCNC: 1.65 UG/L (ref 0–4)
RBC # BLD AUTO: 4.29 10E6/UL (ref 4.4–5.9)
SODIUM SERPL-SCNC: 140 MMOL/L (ref 133–144)
TRIGL SERPL-MCNC: 105 MG/DL
TSH SERPL DL<=0.005 MIU/L-ACNC: 2.7 MU/L (ref 0.4–4)
WBC # BLD AUTO: 4.9 10E3/UL (ref 4–11)

## 2021-09-02 PROCEDURE — 84443 ASSAY THYROID STIM HORMONE: CPT | Mod: ZL | Performed by: FAMILY MEDICINE

## 2021-09-02 PROCEDURE — G0103 PSA SCREENING: HCPCS | Mod: ZL | Performed by: FAMILY MEDICINE

## 2021-09-02 PROCEDURE — 82043 UR ALBUMIN QUANTITATIVE: CPT | Mod: ZL | Performed by: FAMILY MEDICINE

## 2021-09-02 PROCEDURE — 80061 LIPID PANEL: CPT | Mod: ZL | Performed by: FAMILY MEDICINE

## 2021-09-02 PROCEDURE — 36415 COLL VENOUS BLD VENIPUNCTURE: CPT | Mod: ZL | Performed by: FAMILY MEDICINE

## 2021-09-02 PROCEDURE — 85027 COMPLETE CBC AUTOMATED: CPT | Mod: ZL | Performed by: FAMILY MEDICINE

## 2021-09-02 PROCEDURE — 80048 BASIC METABOLIC PNL TOTAL CA: CPT | Mod: ZL | Performed by: FAMILY MEDICINE

## 2021-09-02 PROCEDURE — 83036 HEMOGLOBIN GLYCOSYLATED A1C: CPT | Mod: ZL | Performed by: FAMILY MEDICINE

## 2021-09-02 PROCEDURE — 99396 PREV VISIT EST AGE 40-64: CPT | Performed by: FAMILY MEDICINE

## 2021-09-02 PROCEDURE — 84460 ALANINE AMINO (ALT) (SGPT): CPT | Mod: ZL | Performed by: FAMILY MEDICINE

## 2021-09-02 ASSESSMENT — ANXIETY QUESTIONNAIRES
1. FEELING NERVOUS, ANXIOUS, OR ON EDGE: SEVERAL DAYS
6. BECOMING EASILY ANNOYED OR IRRITABLE: SEVERAL DAYS
7. FEELING AFRAID AS IF SOMETHING AWFUL MIGHT HAPPEN: NOT AT ALL
GAD7 TOTAL SCORE: 5
2. NOT BEING ABLE TO STOP OR CONTROL WORRYING: NOT AT ALL
5. BEING SO RESTLESS THAT IT IS HARD TO SIT STILL: NOT AT ALL
4. TROUBLE RELAXING: NOT AT ALL
IF YOU CHECKED OFF ANY PROBLEMS ON THIS QUESTIONNAIRE, HOW DIFFICULT HAVE THESE PROBLEMS MADE IT FOR YOU TO DO YOUR WORK, TAKE CARE OF THINGS AT HOME, OR GET ALONG WITH OTHER PEOPLE: SOMEWHAT DIFFICULT
3. WORRYING TOO MUCH ABOUT DIFFERENT THINGS: NEARLY EVERY DAY

## 2021-09-02 ASSESSMENT — PAIN SCALES - GENERAL: PAINLEVEL: NO PAIN (0)

## 2021-09-02 ASSESSMENT — MIFFLIN-ST. JEOR: SCORE: 1363.26

## 2021-09-02 ASSESSMENT — PATIENT HEALTH QUESTIONNAIRE - PHQ9: SUM OF ALL RESPONSES TO PHQ QUESTIONS 1-9: 1

## 2021-09-02 NOTE — LETTER
September 3, 2021      Aki DUVALL Ramon  [address] [facility] [address]        Dear ,    We are writing to inform you of your test results.    HgbA1c improved, other labs stable.      Resulted Orders   ALT   Result Value Ref Range    ALT 29 0 - 70 U/L   Basic metabolic panel   Result Value Ref Range    Sodium 140 133 - 144 mmol/L    Potassium 4.4 3.4 - 5.3 mmol/L    Chloride 108 94 - 109 mmol/L    Carbon Dioxide (CO2) 26 20 - 32 mmol/L    Anion Gap 6 3 - 14 mmol/L    Urea Nitrogen 22 7 - 30 mg/dL    Creatinine 0.82 0.66 - 1.25 mg/dL    Calcium 8.8 8.5 - 10.1 mg/dL    Glucose 78 70 - 99 mg/dL    GFR Estimate >90 >60 mL/min/1.73m2      Comment:      As of July 11, 2021, eGFR is calculated by the CKD-EPI creatinine equation, without race adjustment. eGFR can be influenced by muscle mass, exercise, and diet. The reported eGFR is an estimation only and is only applicable if the renal function is stable.   Hemoglobin A1c   Result Value Ref Range    Estimated Average Glucose 126 mg/dL    Hemoglobin A1C 6.0 (H) 0.0 - 5.6 %      Comment:      Normal <5.7%   Prediabetes 5.7-6.4%    Diabetes 6.5% or higher     Note: Adopted from ADA consensus guidelines.   Lipid Profile   Result Value Ref Range    Cholesterol 130 <200 mg/dL      Comment:      Age 0-19 years  Desirable: <170 mg/dL  Borderline high:  170-199 mg/dl  High:            >199 mg/dl    Age 20 years and older  Desirable: <200 mg/dL    Triglycerides 105 <150 mg/dL      Comment:      0-9 years:  Normal:    Less than 75 mg/dL  Borderline high:  75-99 mg/dL  High:             Greater than or equal to 100 mg/dL    0-19 years:  Normal:    Less than 90 mg/dL  Borderline high:   mg/dL  High:             Greater than or equal to 130 mg/dL    20 years and older:  Normal:    Less than 150 mg/dL  Borderline high:  150-199 mg/dL  High:             200-499 mg/dL  Very high:   Greater than or equal to 500 mg/dL    Direct Measure HDL 50 >=40 mg/dL      Comment:       0-19 years:       Greater than or equal to 45 mg/dL   Low: Less than 40 mg/dL   Borderline low: 40-44 mg/dL     20 years and older:   Female: Greater than or equal to 50 mg/dL   Male:   Greater than or equal to 40 mg/dL         LDL Cholesterol Calculated 59 <=100 mg/dL      Comment:      Age 0-19 years:  Desirable: 0-110 mg/dL   Borderline high: 110-129 mg/dL   High: >= 130 mg/dL    Age 20 years and older:  Desirable: <100mg/dL  Above desirable: 100-129 mg/dL   Borderline high: 130-159 mg/dL   High: 160-189 mg/dL   Very high: >= 190 mg/dL    Non HDL Cholesterol 80 <130 mg/dL      Comment:      0-19 years:  Desirable:          Less than 120 mg/dL  Borderline high:   120-144 mg/dL  High:                   Greater than or equal to 145 mg/dL    20 years and older:  Desirable:          130 mg/dL  Above Desirable: 130-159 mg/dL  Borderline high:   160-189 mg/dL  High:               190-219 mg/dL  Very high:     Greater than or equal to 220 mg/dL    Patient Fasting > 8hrs? Yes    TSH with free T4 reflex   Result Value Ref Range    TSH 2.70 0.40 - 4.00 mU/L   PSA, screen   Result Value Ref Range    Prostate Specific Antigen Screen 1.65 0.00 - 4.00 ug/L   CBC with platelets   Result Value Ref Range    WBC Count 4.9 4.0 - 11.0 10e3/uL    RBC Count 4.29 (L) 4.40 - 5.90 10e6/uL    Hemoglobin 13.2 (L) 13.3 - 17.7 g/dL    Hematocrit 38.9 (L) 40.0 - 53.0 %    MCV 91 78 - 100 fL    MCH 30.8 26.5 - 33.0 pg    MCHC 33.9 31.5 - 36.5 g/dL    RDW 13.0 10.0 - 15.0 %    Platelet Count 277 150 - 450 10e3/uL   Albumin Random Urine Quantitative with Creat Ratio   Result Value Ref Range    Creatinine Urine mg/dL 109 mg/dL    Albumin Urine mg/L 65 mg/L    Albumin Urine mg/g Cr 59.63 (H) 0.00 - 17.00 mg/g Cr       If you have any questions or concerns, please call the clinic at the number listed above.       Sincerely,      Nicole Rojas MD

## 2021-09-02 NOTE — NURSING NOTE
"Chief Complaint   Patient presents with     Physical       Initial /66 (BP Location: Right arm, Patient Position: Sitting, Cuff Size: Adult Regular)   Pulse 75   Temp (!) 96.2  F (35.7  C) (Tympanic)   Resp 16   Ht 1.626 m (5' 4\")   Wt 65.7 kg (144 lb 14.4 oz)   SpO2 99%   BMI 24.87 kg/m   Estimated body mass index is 24.87 kg/m  as calculated from the following:    Height as of this encounter: 1.626 m (5' 4\").    Weight as of this encounter: 65.7 kg (144 lb 14.4 oz).  Medication Reconciliation: complete  Marylou Weldon MA  "

## 2021-09-03 ASSESSMENT — ANXIETY QUESTIONNAIRES: GAD7 TOTAL SCORE: 5

## 2021-09-12 ENCOUNTER — HEALTH MAINTENANCE LETTER (OUTPATIENT)
Age: 66
End: 2021-09-12

## 2021-09-14 DIAGNOSIS — Z00.00 HEALTHCARE MAINTENANCE: ICD-10-CM

## 2021-09-15 RX ORDER — CHLORHEXIDINE GLUCONATE ORAL RINSE 1.2 MG/ML
SOLUTION DENTAL
Qty: 473 ML | Refills: 11 | Status: SHIPPED | OUTPATIENT
Start: 2021-09-15 | End: 2022-06-09

## 2021-09-15 NOTE — TELEPHONE ENCOUNTER
peridex      Last Written Prescription Date:  12/22/2020  Last Fill Quantity: 473 ml,   # refills: 11  Last Office Visit: 9/2/21  Future Office visit:

## 2021-09-20 DIAGNOSIS — I10 ESSENTIAL HYPERTENSION: ICD-10-CM

## 2021-09-20 DIAGNOSIS — E11.9 TYPE 2 DIABETES MELLITUS WITHOUT COMPLICATION, WITHOUT LONG-TERM CURRENT USE OF INSULIN (H): ICD-10-CM

## 2021-09-21 RX ORDER — RAMIPRIL 5 MG/1
CAPSULE ORAL
Qty: 30 CAPSULE | Refills: 5 | Status: SHIPPED | OUTPATIENT
Start: 2021-09-21 | End: 2022-03-22

## 2021-09-21 NOTE — TELEPHONE ENCOUNTER
Metformin       Last Written Prescription Date:  3/23/2021  Last Fill Quantity: 60,   # refills: 5  Last Office Visit: 9/2/2021  Future Office visit:

## 2021-09-21 NOTE — TELEPHONE ENCOUNTER
Kurt       Last Written Prescription Date:  5/26/2021  Last Fill Quantity: 30,   # refills: 3  Last Office Visit: 9/2/2021  Future Office visit:

## 2021-10-06 DIAGNOSIS — L23.7 CONTACT DERMATITIS DUE TO POISON IVY: ICD-10-CM

## 2021-10-07 RX ORDER — TRIAMCINOLONE ACETONIDE 1 MG/G
CREAM TOPICAL
Qty: 80 G | Refills: 1 | Status: SHIPPED | OUTPATIENT
Start: 2021-10-07 | End: 2022-02-18

## 2021-12-08 DIAGNOSIS — E11.65 TYPE 2 DIABETES MELLITUS WITH HYPERGLYCEMIA, WITHOUT LONG-TERM CURRENT USE OF INSULIN (H): ICD-10-CM

## 2021-12-09 RX ORDER — LANCETS 28 GAUGE
EACH MISCELLANEOUS
Qty: 100 EACH | Refills: 2 | Status: SHIPPED | OUTPATIENT
Start: 2021-12-09 | End: 2022-10-05

## 2021-12-20 DIAGNOSIS — L50.9 HIVES: ICD-10-CM

## 2021-12-21 RX ORDER — CETIRIZINE HYDROCHLORIDE 10 MG/1
TABLET ORAL
Qty: 30 TABLET | Refills: 11 | Status: SHIPPED | OUTPATIENT
Start: 2021-12-21 | End: 2022-12-16

## 2021-12-21 NOTE — TELEPHONE ENCOUNTER
Zyrtec      Last Written Prescription Date:  12/22/20  Last Fill Quantity: 30,   # refills: 11  Last Office Visit: 09/02/21  Future Office visit:    Next 5 appointments (look out 90 days)    Mar 03, 2022  9:45 AM  (Arrive by 9:30 AM)  SHORT with Nicole Rojas MD  [facility] ([facility] ) [address]  [facility] [address]  [phone]

## 2022-01-10 DIAGNOSIS — Z00.00 HEALTHCARE MAINTENANCE: ICD-10-CM

## 2022-01-11 RX ORDER — MULTIVITAMIN/IRON/FOLIC ACID 18MG-0.4MG
TABLET ORAL
Qty: 100 TABLET | Refills: 3 | Status: SHIPPED | OUTPATIENT
Start: 2022-01-11 | End: 2023-08-04

## 2022-01-11 NOTE — TELEPHONE ENCOUNTER
MULTI VITAMIN      Last Written Prescription Date:  8-  Last Fill Quantity: 130,   # refills: 0  Last Office Visit: 9-2-2021  Future Office visit:    Next 5 appointments (look out 90 days)    Mar 03, 2022  9:45 AM  (Arrive by 9:30 AM)  SHORT with Nicole Rojas MD  [facility] ([facility] ) [address]  [facility] [address]  [phone]           Routing refill request to provider for review/approval because:

## 2022-01-14 ENCOUNTER — TELEPHONE (OUTPATIENT)
Dept: FAMILY MEDICINE | Facility: OTHER | Age: 67
End: 2022-01-14
Payer: MEDICARE

## 2022-01-14 DIAGNOSIS — R26.9 ABNORMAL GAIT: ICD-10-CM

## 2022-01-14 DIAGNOSIS — F79 INTELLECTUAL DISABILITY: Primary | ICD-10-CM

## 2022-01-19 ENCOUNTER — TRANSFERRED RECORDS (OUTPATIENT)
Dept: HEALTH INFORMATION MANAGEMENT | Facility: CLINIC | Age: 67
End: 2022-01-19

## 2022-01-28 DIAGNOSIS — L30.9 DERMATITIS: Primary | ICD-10-CM

## 2022-01-31 RX ORDER — MENTHOL/CAMPHOR 0.5 %-0.5%
LOTION (ML) TOPICAL
Qty: 222 ML | Refills: 1 | Status: SHIPPED | OUTPATIENT
Start: 2022-01-31 | End: 2022-03-03

## 2022-01-31 NOTE — TELEPHONE ENCOUNTER
Keith  Last Written Prescription Date: Not previously prescribed  Last Fill Quantity: NA # of Refills: NA  Last Office Visit: 9/2/21

## 2022-02-17 DIAGNOSIS — L23.7 CONTACT DERMATITIS DUE TO POISON IVY: ICD-10-CM

## 2022-02-18 DIAGNOSIS — K21.9 GASTROESOPHAGEAL REFLUX DISEASE, UNSPECIFIED WHETHER ESOPHAGITIS PRESENT: ICD-10-CM

## 2022-02-18 DIAGNOSIS — E78.2 MIXED HYPERLIPIDEMIA: ICD-10-CM

## 2022-02-18 RX ORDER — TRIAMCINOLONE ACETONIDE 1 MG/G
CREAM TOPICAL
Qty: 80 G | Refills: 1 | Status: SHIPPED | OUTPATIENT
Start: 2022-02-18 | End: 2022-03-04

## 2022-02-18 NOTE — TELEPHONE ENCOUNTER
FERNY      Last Written Prescription Date:  10-6-2021  Last Fill Quantity: 80G,   # refills: 1  Last Office Visit: 9-2-2021  Future Office visit:    Next 5 appointments (look out 90 days)    Mar 03, 2022  9:45 AM  (Arrive by 9:30 AM)  SHORT with Nicole Rojas MD  [facility] ([facility] ) [address]  [facility] [address]  [phone]           Routing refill request to provider for review/approval because:

## 2022-02-19 NOTE — TELEPHONE ENCOUNTER
Pepcid      Last Written Prescription Date:  3/12/21  Last Fill Quantity: 30,   # refills: 11  Last Office Visit: 9/2/21  Future Office visit:    Next 5 appointments (look out 90 days)    Mar 03, 2022  9:45 AM  (Arrive by 9:30 AM)  SHORT with Nicole Rojas MD  [facility] ([facility] ) [address]  [place] [address]  [phone]           Routing refill request to provider for review/approval because:      Simvastatin      Last Written Prescription Date:  8/24/21  Last Fill Quantity: 90,   # refills: 1  Last Office Visit: 9/2/21  Future Office visit:    Next 5 appointments (look out 90 days)    Mar 03, 2022  9:45 AM  (Arrive by 9:30 AM)  SHORT with Nicole Rojas MD  [facility] ([facility] ) [address]  [place] [address]  [phone]           Routing refill request to provider for review/approval because:

## 2022-02-21 RX ORDER — FAMOTIDINE 40 MG/1
40 TABLET, FILM COATED ORAL DAILY
Qty: 30 TABLET | Refills: 11 | Status: SHIPPED | OUTPATIENT
Start: 2022-02-21 | End: 2023-02-16

## 2022-02-21 RX ORDER — SIMVASTATIN 10 MG
TABLET ORAL
Qty: 90 TABLET | Refills: 3 | Status: SHIPPED | OUTPATIENT
Start: 2022-02-21 | End: 2023-02-16

## 2022-03-03 ENCOUNTER — OFFICE VISIT (OUTPATIENT)
Dept: FAMILY MEDICINE | Facility: OTHER | Age: 67
End: 2022-03-03
Attending: FAMILY MEDICINE
Payer: MEDICARE

## 2022-03-03 VITALS
HEART RATE: 98 BPM | OXYGEN SATURATION: 98 % | HEIGHT: 64 IN | RESPIRATION RATE: 16 BRPM | BODY MASS INDEX: 24.24 KG/M2 | TEMPERATURE: 98.3 F | WEIGHT: 142 LBS

## 2022-03-03 DIAGNOSIS — L30.9 DERMATITIS: ICD-10-CM

## 2022-03-03 DIAGNOSIS — Z12.11 COLON CANCER SCREENING: ICD-10-CM

## 2022-03-03 DIAGNOSIS — N18.1 CHRONIC KIDNEY DISEASE, STAGE 1: ICD-10-CM

## 2022-03-03 DIAGNOSIS — I10 ESSENTIAL HYPERTENSION, BENIGN: ICD-10-CM

## 2022-03-03 DIAGNOSIS — Z79.84 LONG TERM (CURRENT) USE OF ORAL HYPOGLYCEMIC DRUGS: ICD-10-CM

## 2022-03-03 DIAGNOSIS — E78.5 HYPERLIPIDEMIA, UNSPECIFIED HYPERLIPIDEMIA TYPE: ICD-10-CM

## 2022-03-03 DIAGNOSIS — E11.65 TYPE 2 DIABETES MELLITUS WITH HYPERGLYCEMIA, WITHOUT LONG-TERM CURRENT USE OF INSULIN (H): Primary | ICD-10-CM

## 2022-03-03 DIAGNOSIS — F79 INTELLECTUAL DISABILITY: ICD-10-CM

## 2022-03-03 LAB
ALT SERPL W P-5'-P-CCNC: 25 U/L (ref 0–70)
ANION GAP SERPL CALCULATED.3IONS-SCNC: 8 MMOL/L (ref 3–14)
BUN SERPL-MCNC: 21 MG/DL (ref 7–30)
CALCIUM SERPL-MCNC: 9.2 MG/DL (ref 8.5–10.1)
CHLORIDE BLD-SCNC: 102 MMOL/L (ref 94–109)
CHOLEST SERPL-MCNC: 99 MG/DL
CO2 SERPL-SCNC: 25 MMOL/L (ref 20–32)
CREAT SERPL-MCNC: 0.78 MG/DL (ref 0.66–1.25)
EST. AVERAGE GLUCOSE BLD GHB EST-MCNC: 131 MG/DL
FASTING STATUS PATIENT QL REPORTED: NO
GFR SERPL CREATININE-BSD FRML MDRD: >90 ML/MIN/1.73M2
GLUCOSE BLD-MCNC: 66 MG/DL (ref 70–99)
HBA1C MFR BLD: 6.2 % (ref 0–5.6)
HDLC SERPL-MCNC: 47 MG/DL
LDLC SERPL CALC-MCNC: 34 MG/DL
NONHDLC SERPL-MCNC: 52 MG/DL
POTASSIUM BLD-SCNC: 4.3 MMOL/L (ref 3.4–5.3)
SODIUM SERPL-SCNC: 135 MMOL/L (ref 133–144)
TRIGL SERPL-MCNC: 91 MG/DL
VIT B12 SERPL-MCNC: 441 PG/ML (ref 193–986)

## 2022-03-03 PROCEDURE — 80061 LIPID PANEL: CPT | Mod: ZL | Performed by: FAMILY MEDICINE

## 2022-03-03 PROCEDURE — 80048 BASIC METABOLIC PNL TOTAL CA: CPT | Mod: ZL | Performed by: FAMILY MEDICINE

## 2022-03-03 PROCEDURE — 36415 COLL VENOUS BLD VENIPUNCTURE: CPT | Mod: ZL | Performed by: FAMILY MEDICINE

## 2022-03-03 PROCEDURE — 82306 VITAMIN D 25 HYDROXY: CPT | Mod: ZL | Performed by: FAMILY MEDICINE

## 2022-03-03 PROCEDURE — 84460 ALANINE AMINO (ALT) (SGPT): CPT | Mod: ZL | Performed by: FAMILY MEDICINE

## 2022-03-03 PROCEDURE — 82607 VITAMIN B-12: CPT | Mod: ZL | Performed by: FAMILY MEDICINE

## 2022-03-03 PROCEDURE — 99214 OFFICE O/P EST MOD 30 MIN: CPT | Performed by: FAMILY MEDICINE

## 2022-03-03 PROCEDURE — 83036 HEMOGLOBIN GLYCOSYLATED A1C: CPT | Mod: ZL | Performed by: FAMILY MEDICINE

## 2022-03-03 PROCEDURE — G0463 HOSPITAL OUTPT CLINIC VISIT: HCPCS | Performed by: FAMILY MEDICINE

## 2022-03-03 RX ORDER — CAMPHOR, MENTHOL .5; .5 G/100G; G/100G
LOTION TOPICAL
Qty: 222 ML | Refills: 1 | Status: SHIPPED | OUTPATIENT
Start: 2022-03-03 | End: 2022-06-09

## 2022-03-03 ASSESSMENT — PAIN SCALES - GENERAL: PAINLEVEL: NO PAIN (0)

## 2022-03-03 NOTE — LETTER
March 4, 2022      Aki DUVALL Ramon  [address] [facility] [address]        Dear ,    We are writing to inform you of your test results.    A1c is very good, blood glucose readings throughout the day must be good.  No need to change medication or increase number of glucose checks a day.  Other labs are all normal/stable.  Vitamin D level pending.       Resulted Orders   ALT   Result Value Ref Range    ALT 25 0 - 70 U/L   Basic metabolic panel   Result Value Ref Range    Sodium 135 133 - 144 mmol/L    Potassium 4.3 3.4 - 5.3 mmol/L    Chloride 102 94 - 109 mmol/L    Carbon Dioxide (CO2) 25 20 - 32 mmol/L    Anion Gap 8 3 - 14 mmol/L    Urea Nitrogen 21 7 - 30 mg/dL    Creatinine 0.78 0.66 - 1.25 mg/dL    Calcium 9.2 8.5 - 10.1 mg/dL    Glucose 66 (L) 70 - 99 mg/dL    GFR Estimate >90 >60 mL/min/1.73m2      Comment:      Effective December 21, 2021 eGFRcr in adults is calculated using the 2021 CKD-EPI creatinine equation which includes age and gender (Kaylie mcadams al., NEJ, DOI: 10.1056/XNEXic0595493)   Hemoglobin A1c   Result Value Ref Range    Estimated Average Glucose 131 mg/dL    Hemoglobin A1C 6.2 (H) 0.0 - 5.6 %      Comment:      Normal <5.7%   Prediabetes 5.7-6.4%    Diabetes 6.5% or higher     Note: Adopted from ADA consensus guidelines.   Lipid Profile   Result Value Ref Range    Cholesterol 99 <200 mg/dL    Triglycerides 91 <150 mg/dL    Direct Measure HDL 47 >=40 mg/dL    LDL Cholesterol Calculated 34 <=100 mg/dL    Non HDL Cholesterol 52 <130 mg/dL    Patient Fasting > 8hrs? No     Narrative    Cholesterol  Desirable:  <200 mg/dL    Triglycerides  Normal:  Less than 150 mg/dL  Borderline High:  150-199 mg/dL  High:  200-499 mg/dL  Very High:  Greater than or equal to 500 mg/dL    Direct Measure HDL  Female:  Greater than or equal to 50 mg/dL   Male:  Greater than or equal to 40 mg/dL    LDL Cholesterol  Desirable:  <100mg/dL  Above Desirable:  100-129 mg/dL   Borderline High:  130-159 mg/dL    High:  160-189 mg/dL   Very High:  >= 190 mg/dL    Non HDL Cholesterol  Desirable:  130 mg/dL  Above Desirable:  130-159 mg/dL  Borderline High:  160-189 mg/dL  High:  190-219 mg/dL  Very High:  Greater than or equal to 220 mg/dL   Vitamin B12   Result Value Ref Range    Vitamin B12 441 193 - 986 pg/mL       If you have any questions or concerns, please call the clinic at the number listed above.       Sincerely,      Nicole Rojas MD

## 2022-03-03 NOTE — NURSING NOTE
"Chief Complaint   Patient presents with     Diabetes     Lipids     Hypertension       Initial Pulse 98   Temp 98.3  F (36.8  C) (Tympanic)   Resp 16   Ht 1.626 m (5' 4\")   Wt 64.4 kg (142 lb)   SpO2 98%   BMI 24.37 kg/m   Estimated body mass index is 24.37 kg/m  as calculated from the following:    Height as of this encounter: 1.626 m (5' 4\").    Weight as of this encounter: 64.4 kg (142 lb).  Medication Reconciliation: complete  Marylou Weldon MA  "

## 2022-03-03 NOTE — LETTER
March 7, 2022      Aki DUVALL Ramon  [address] [facility] [address]        Dear ,    We are writing to inform you of your test results.        Resulted Orders   ALT   Result Value Ref Range    ALT 25 0 - 70 U/L   Basic metabolic panel   Result Value Ref Range    Sodium 135 133 - 144 mmol/L    Potassium 4.3 3.4 - 5.3 mmol/L    Chloride 102 94 - 109 mmol/L    Carbon Dioxide (CO2) 25 20 - 32 mmol/L    Anion Gap 8 3 - 14 mmol/L    Urea Nitrogen 21 7 - 30 mg/dL    Creatinine 0.78 0.66 - 1.25 mg/dL    Calcium 9.2 8.5 - 10.1 mg/dL    Glucose 66 (L) 70 - 99 mg/dL    GFR Estimate >90 >60 mL/min/1.73m2      Comment:      Effective December 21, 2021 eGFRcr in adults is calculated using the 2021 CKD-EPI creatinine equation which includes age and gender (Kaylie mcadams al., [facility], DOI: 10.1056/DGXNzj8135772)   Hemoglobin A1c   Result Value Ref Range    Estimated Average Glucose 131 mg/dL    Hemoglobin A1C 6.2 (H) 0.0 - 5.6 %      Comment:      Normal <5.7%   Prediabetes 5.7-6.4%    Diabetes 6.5% or higher     Note: Adopted from ADA consensus guidelines.   Lipid Profile   Result Value Ref Range    Cholesterol 99 <200 mg/dL    Triglycerides 91 <150 mg/dL    Direct Measure HDL 47 >=40 mg/dL    LDL Cholesterol Calculated 34 <=100 mg/dL    Non HDL Cholesterol 52 <130 mg/dL    Patient Fasting > 8hrs? No     Narrative    Cholesterol  Desirable:  <200 mg/dL    Triglycerides  Normal:  Less than 150 mg/dL  Borderline High:  150-199 mg/dL  High:  200-499 mg/dL  Very High:  Greater than or equal to 500 mg/dL    Direct Measure HDL  Female:  Greater than or equal to 50 mg/dL   Male:  Greater than or equal to 40 mg/dL    LDL Cholesterol  Desirable:  <100mg/dL  Above Desirable:  100-129 mg/dL   Borderline High:  130-159 mg/dL   High:  160-189 mg/dL   Very High:  >= 190 mg/dL    Non HDL Cholesterol  Desirable:  130 mg/dL  Above Desirable:  130-159 mg/dL  Borderline High:  160-189 mg/dL  High:  190-219 mg/dL  Very High:  Greater than or  equal to 220 mg/dL   Vitamin D Deficiency   Result Value Ref Range    Vitamin D, Total (25-Hydroxy) 41 20 - 75 ug/L    Narrative    Season, race, dietary intake, and treatment affect the concentration of 25-hydroxy-Vitamin D. Values may decrease during winter months and increase during summer months. Values 20-29 ug/L may indicate Vitamin D insufficiency and values <20 ug/L may indicate Vitamin D deficiency.    Vitamin D determination is routinely performed by an immunoassay specific for 25 hydroxyvitamin D3.  If an individual is on vitamin D2(ergocalciferol) supplementation, please specify 25 OH vitamin D2 and D3 level determination by LCMSMS test VITD23.     Vitamin B12   Result Value Ref Range    Vitamin B12 441 193 - 986 pg/mL       If you have any questions or concerns, please call the clinic at the number listed above.       Sincerely,      Nicole Rojas MD

## 2022-03-03 NOTE — PROGRESS NOTES
Assessment & Plan     1. Type 2 diabetes mellitus with hyperglycemia, without long-term current use of insulin (H)  BG reviewed, readings aren't too bad.  Will update labs, adjust medication as needed.  - Hemoglobin A1c; Future  - Vitamin D Deficiency; Future  - Vitamin B12; Future  - Hemoglobin A1c  - Vitamin D Deficiency  - Vitamin B12    2. Hyperlipidemia, unspecified hyperlipidemia type  As above.  - ALT; Future  - Lipid Profile; Future  - Vitamin D Deficiency; Future  - Vitamin B12; Future  - ALT  - Lipid Profile  - Vitamin D Deficiency  - Vitamin B12    3. Essential hypertension, benign  As above.  - Basic metabolic panel; Future  - Vitamin D Deficiency; Future  - Vitamin B12; Future  - Basic metabolic panel  - Vitamin D Deficiency  - Vitamin B12    4. Chronic kidney disease, stage 1    5. Mental retardation  Labs updated  - Vitamin D Deficiency; Future  - Vitamin B12; Future  - Vitamin D Deficiency  - Vitamin B12    6. Dermatitis  Labs updated, patient is encouraged to allow creams to be applied.  - Vitamin D Deficiency; Future  - Vitamin B12; Future  - Vitamin D Deficiency  - Vitamin B12    7. Colon cancer screening  - Vitamin D Deficiency; Future  - Vitamin B12; Future  - Vitamin D Deficiency  - Vitamin B12    8. Long term (current) use of oral hypoglycemic drugs   - Vitamin D Deficiency; Future  - Vitamin D Deficiency    Staff are having problems with hygiene, compliance with creams, cooperation with Cologuard test, etc.  I did explain very simply to the the patient why these are important.    Return in about 6 months (around 9/3/2022) for Diabetic Follow-up, Chronic Disease Management, Medication review.    Nicole Rojas MD  [facility] - [place] NIKA Prabhakar is a 66 year old who presents for the following health issues  accompanied by staff from his residence.    HPI     Diabetes Follow-up    How often are you checking your blood sugar? One time daily  What time of day are  you checking your blood sugars (select all that apply)?  Before meals  Have you had any blood sugars above 200?  No  Have you had any blood sugars below 70?  No    What symptoms do you notice when your blood sugar is low?  None    What concerns do you have today about your diabetes? None     Do you have any of these symptoms? (Select all that apply)  No numbness or tingling in feet.  No redness, sores or blisters on feet.  No complaints of excessive thirst.  No reports of blurry vision.  No significant changes to weight.      Hyperlipidemia Follow-Up      Are you regularly taking any medication or supplement to lower your cholesterol?   Yes- zocor    Are you having muscle aches or other side effects that you think could be caused by your cholesterol lowering medication?  No    Hypertension Follow-up      Do you check your blood pressure regularly outside of the clinic? No     Are you following a low salt diet? No    Are your blood pressures ever more than 140 on the top number (systolic) OR more   than 90 on the bottom number (diastolic), for example 140/90? No    BP Readings from Last 2 Encounters:   09/02/21 126/66   11/16/20 110/64     Hemoglobin A1C POCT (%)   Date Value   11/16/2020 6.4 (H)   02/12/2020 6.9 (H)     Hemoglobin A1C (%)   Date Value   03/03/2022 6.2 (H)   09/02/2021 6.0 (H)     LDL Cholesterol Calculated (mg/dL)   Date Value   03/03/2022 34   09/02/2021 59   11/16/2020 47   02/12/2020 21         How many servings of fruits and vegetables do you eat daily?  4 or more    On average, how many sweetened beverages do you drink each day (Examples: soda, juice, sweet tea, etc.  Do NOT count diet or artificially sweetened beverages)?   0    How many days per week do you exercise enough to make your heart beat faster? 7    How many minutes a day do you exercise enough to make your heart beat faster? 30 - 60    How many days per week do you miss taking your medication? 0    Staff are having problems with  "patient allowing creams to be applied for itchy skin.    Patient is due for repeat Cologuard screening, but he refuses to allow stool sample to be collected.      Review of Systems   Constitutional, HEENT, cardiovascular, pulmonary, gi and gu systems are negative, except as otherwise noted.      Objective    Pulse 98   Temp 98.3  F (36.8  C) (Tympanic)   Resp 16   Ht 1.626 m (5' 4\")   Wt 64.4 kg (142 lb)   SpO2 98%   BMI 24.37 kg/m    Body mass index is 24.37 kg/m .  Physical Exam   GENERAL: alert and no distress  SKIN: limited exam of skin shows irritation and excoriation  PSYCH: affect normal            "

## 2022-03-03 NOTE — TELEPHONE ENCOUNTER
Anti-Itch (menthol-camphor) 0.5 %-0.5 % lotion  Last Written Prescription Date:  1/31/2022  Last Fill Quantity: 222ml,   # refills: 1  Last Office Visit: 9/2/2021  Future Office visit:       Routing refill request to provider for review/approval because:

## 2022-03-04 DIAGNOSIS — L23.7 CONTACT DERMATITIS DUE TO POISON IVY: ICD-10-CM

## 2022-03-04 LAB — DEPRECATED CALCIDIOL+CALCIFEROL SERPL-MC: 41 UG/L (ref 20–75)

## 2022-03-04 RX ORDER — TRIAMCINOLONE ACETONIDE 1 MG/G
CREAM TOPICAL
Qty: 80 G | Refills: 11 | Status: SHIPPED | OUTPATIENT
Start: 2022-03-04 | End: 2023-01-25

## 2022-03-08 PROBLEM — N18.1 CHRONIC KIDNEY DISEASE, STAGE 1: Status: ACTIVE | Noted: 2022-03-08

## 2022-03-16 DIAGNOSIS — E11.65 TYPE 2 DIABETES MELLITUS WITH HYPERGLYCEMIA, WITHOUT LONG-TERM CURRENT USE OF INSULIN (H): ICD-10-CM

## 2022-03-16 RX ORDER — ALPRAZOLAM 1 MG/1
TABLET ORAL
Qty: 60 STRIP | Refills: 7 | Status: SHIPPED | OUTPATIENT
Start: 2022-03-16 | End: 2023-04-26

## 2022-03-21 DIAGNOSIS — E11.9 TYPE 2 DIABETES MELLITUS WITHOUT COMPLICATION, WITHOUT LONG-TERM CURRENT USE OF INSULIN (H): ICD-10-CM

## 2022-03-21 DIAGNOSIS — I10 ESSENTIAL HYPERTENSION: ICD-10-CM

## 2022-03-22 RX ORDER — GLIPIZIDE 5 MG/1
TABLET, FILM COATED, EXTENDED RELEASE ORAL
Qty: 30 TABLET | Refills: 5 | Status: SHIPPED | OUTPATIENT
Start: 2022-03-22 | End: 2022-09-19

## 2022-03-22 RX ORDER — RAMIPRIL 5 MG/1
CAPSULE ORAL
Qty: 30 CAPSULE | Refills: 5 | Status: SHIPPED | OUTPATIENT
Start: 2022-03-22 | End: 2022-09-19

## 2022-03-22 NOTE — TELEPHONE ENCOUNTER
Metformin      Last Written Prescription Date:  9/21/21  Last Fill Quantity: 60,   # refills: 5  Last Office Visit: 3/3/22  Future Office visit:       Routing refill request to provider for review/approval because:      Glipizide      Last Written Prescription Date:  8/26/21  Last Fill Quantity: 30,   # refills: 5  Last Office Visit: 3/3/22  Future Office visit:       Routing refill request to provider for review/approval because:      Altace      Last Written Prescription Date:  9/21/21  Last Fill Quantity: 30,   # refills: 5  Last Office Visit: 3/3/22  Future Office visit:       Routing refill request to provider for review/approval because:

## 2022-06-08 DIAGNOSIS — L30.9 DERMATITIS: ICD-10-CM

## 2022-06-08 DIAGNOSIS — Z00.00 HEALTHCARE MAINTENANCE: ICD-10-CM

## 2022-06-09 RX ORDER — CAMPHOR, MENTHOL .5; .5 G/100G; G/100G
LOTION TOPICAL
Qty: 222 ML | Refills: 11 | Status: SHIPPED | OUTPATIENT
Start: 2022-06-09 | End: 2023-07-17

## 2022-06-09 RX ORDER — CHLORHEXIDINE GLUCONATE ORAL RINSE 1.2 MG/ML
SOLUTION DENTAL
Qty: 473 ML | Refills: 11 | Status: SHIPPED | OUTPATIENT
Start: 2022-06-09 | End: 2023-01-25

## 2022-09-15 DIAGNOSIS — I10 ESSENTIAL HYPERTENSION: ICD-10-CM

## 2022-09-15 DIAGNOSIS — E11.9 TYPE 2 DIABETES MELLITUS WITHOUT COMPLICATION, WITHOUT LONG-TERM CURRENT USE OF INSULIN (H): ICD-10-CM

## 2022-09-19 RX ORDER — GLIPIZIDE 5 MG/1
TABLET, FILM COATED, EXTENDED RELEASE ORAL
Qty: 30 TABLET | Refills: 5 | Status: SHIPPED | OUTPATIENT
Start: 2022-09-19 | End: 2023-03-15

## 2022-09-19 RX ORDER — RAMIPRIL 5 MG/1
CAPSULE ORAL
Qty: 30 CAPSULE | Refills: 5 | Status: SHIPPED | OUTPATIENT
Start: 2022-09-19 | End: 2023-03-15

## 2022-09-19 NOTE — TELEPHONE ENCOUNTER
GLIPIZIDE 5MG      METFORMIN 100MG     AND   ALTACE 5MG      Last Written Prescription Date:  3-  Last Fill Quantity: 1 MONTH ,   # refills: 5  Last Office Visit: 3-3-2022  Future Office visit:    Next 5 appointments (look out 90 days)    Sep 29, 2022  9:00 AM  (Arrive by 8:45 AM)  PHYSICAL with Nicole Roajs MD  [facility] ([facility] ) [address]  [facility] [address]  [phone]           Routing refill request to provider for review/approval because:

## 2022-09-29 ENCOUNTER — OFFICE VISIT (OUTPATIENT)
Dept: FAMILY MEDICINE | Facility: OTHER | Age: 67
End: 2022-09-29
Attending: FAMILY MEDICINE
Payer: MEDICARE

## 2022-09-29 VITALS
DIASTOLIC BLOOD PRESSURE: 66 MMHG | RESPIRATION RATE: 16 BRPM | HEIGHT: 64 IN | HEART RATE: 79 BPM | SYSTOLIC BLOOD PRESSURE: 106 MMHG | TEMPERATURE: 96.6 F | BODY MASS INDEX: 23.18 KG/M2 | OXYGEN SATURATION: 98 % | WEIGHT: 135.8 LBS

## 2022-09-29 DIAGNOSIS — F79 INTELLECTUAL DISABILITY: ICD-10-CM

## 2022-09-29 DIAGNOSIS — E78.5 HYPERLIPIDEMIA, UNSPECIFIED HYPERLIPIDEMIA TYPE: ICD-10-CM

## 2022-09-29 DIAGNOSIS — N18.1 CHRONIC KIDNEY DISEASE, STAGE 1: ICD-10-CM

## 2022-09-29 DIAGNOSIS — Z12.11 COLON CANCER SCREENING: ICD-10-CM

## 2022-09-29 DIAGNOSIS — Z00.00 ROUTINE GENERAL MEDICAL EXAMINATION AT A HEALTH CARE FACILITY: Primary | ICD-10-CM

## 2022-09-29 DIAGNOSIS — I10 ESSENTIAL HYPERTENSION, BENIGN: ICD-10-CM

## 2022-09-29 DIAGNOSIS — Z12.5 SCREENING FOR MALIGNANT NEOPLASM OF PROSTATE: ICD-10-CM

## 2022-09-29 DIAGNOSIS — E11.65 TYPE 2 DIABETES MELLITUS WITH HYPERGLYCEMIA, WITHOUT LONG-TERM CURRENT USE OF INSULIN (H): ICD-10-CM

## 2022-09-29 LAB
ALBUMIN SERPL-MCNC: 3.5 G/DL (ref 3.4–5)
ALP SERPL-CCNC: 65 U/L (ref 40–150)
ALT SERPL W P-5'-P-CCNC: 22 U/L (ref 0–70)
ANION GAP SERPL CALCULATED.3IONS-SCNC: 6 MMOL/L (ref 3–14)
AST SERPL W P-5'-P-CCNC: 12 U/L (ref 0–45)
BILIRUB SERPL-MCNC: 0.3 MG/DL (ref 0.2–1.3)
BUN SERPL-MCNC: 26 MG/DL (ref 7–30)
CALCIUM SERPL-MCNC: 8.7 MG/DL (ref 8.5–10.1)
CHLORIDE BLD-SCNC: 103 MMOL/L (ref 94–109)
CHOLEST SERPL-MCNC: 100 MG/DL
CO2 SERPL-SCNC: 28 MMOL/L (ref 20–32)
CREAT SERPL-MCNC: 0.82 MG/DL (ref 0.66–1.25)
CREAT UR-MCNC: 116 MG/DL
ERYTHROCYTE [DISTWIDTH] IN BLOOD BY AUTOMATED COUNT: 13 % (ref 10–15)
EST. AVERAGE GLUCOSE BLD GHB EST-MCNC: 137 MG/DL
FASTING STATUS PATIENT QL REPORTED: YES
GFR SERPL CREATININE-BSD FRML MDRD: >90 ML/MIN/1.73M2
GLUCOSE BLD-MCNC: 96 MG/DL (ref 70–99)
HBA1C MFR BLD: 6.4 % (ref 0–5.6)
HCT VFR BLD AUTO: 37.1 % (ref 40–53)
HDLC SERPL-MCNC: 48 MG/DL
HGB BLD-MCNC: 12.9 G/DL (ref 13.3–17.7)
LDLC SERPL CALC-MCNC: 38 MG/DL
MCH RBC QN AUTO: 31.2 PG (ref 26.5–33)
MCHC RBC AUTO-ENTMCNC: 34.8 G/DL (ref 31.5–36.5)
MCV RBC AUTO: 90 FL (ref 78–100)
MICROALBUMIN UR-MCNC: 16 MG/L
MICROALBUMIN/CREAT UR: 13.79 MG/G CR (ref 0–17)
NONHDLC SERPL-MCNC: 52 MG/DL
PLATELET # BLD AUTO: 306 10E3/UL (ref 150–450)
POTASSIUM BLD-SCNC: 4.5 MMOL/L (ref 3.4–5.3)
PROT SERPL-MCNC: 7.1 G/DL (ref 6.8–8.8)
PSA SERPL-MCNC: 1.02 UG/L (ref 0–4)
RBC # BLD AUTO: 4.14 10E6/UL (ref 4.4–5.9)
SODIUM SERPL-SCNC: 137 MMOL/L (ref 133–144)
TRIGL SERPL-MCNC: 72 MG/DL
TSH SERPL DL<=0.005 MIU/L-ACNC: 3.36 MU/L (ref 0.4–4)
WBC # BLD AUTO: 5 10E3/UL (ref 4–11)

## 2022-09-29 PROCEDURE — 36415 COLL VENOUS BLD VENIPUNCTURE: CPT | Mod: ZL | Performed by: FAMILY MEDICINE

## 2022-09-29 PROCEDURE — 80053 COMPREHEN METABOLIC PANEL: CPT | Mod: ZL | Performed by: FAMILY MEDICINE

## 2022-09-29 PROCEDURE — 84443 ASSAY THYROID STIM HORMONE: CPT | Mod: ZL | Performed by: FAMILY MEDICINE

## 2022-09-29 PROCEDURE — G0438 PPPS, INITIAL VISIT: HCPCS | Performed by: FAMILY MEDICINE

## 2022-09-29 PROCEDURE — 85018 HEMOGLOBIN: CPT | Mod: ZL | Performed by: FAMILY MEDICINE

## 2022-09-29 PROCEDURE — 80061 LIPID PANEL: CPT | Mod: ZL | Performed by: FAMILY MEDICINE

## 2022-09-29 PROCEDURE — G0103 PSA SCREENING: HCPCS | Mod: ZL | Performed by: FAMILY MEDICINE

## 2022-09-29 PROCEDURE — 83036 HEMOGLOBIN GLYCOSYLATED A1C: CPT | Mod: ZL | Performed by: FAMILY MEDICINE

## 2022-09-29 PROCEDURE — 82043 UR ALBUMIN QUANTITATIVE: CPT | Mod: ZL | Performed by: FAMILY MEDICINE

## 2022-09-29 ASSESSMENT — PAIN SCALES - GENERAL: PAINLEVEL: NO PAIN (0)

## 2022-09-29 NOTE — PATIENT INSTRUCTIONS
Psychologists/ Counselors                        [place]          ...            ..[phone]  [facility]                    ..  [phone]  [facility]                 [phone]  [facility] (kids)       .         [phone]  [facility](teens)                ..[phone]  [facility]                    [phone]  [facility]                   [phone]  [facility]           ...      .. [phone]  [facility]                ...[phone]  [facility]     ...          ...[phone]  [facility]               [phone]   [facility]..            [phone]  [facility]                       [phone]  [facility]               .    [phone]  [facility]                ..[phone]  [facility]     .      . ..[phone]     [facility]              .   [phone]  [facility]                   [phone]              [facility]              .   [phone]  [facility]              .  ...[phone]  [facility]              .   ..[phone]  [facility]           ...      .. [phone]  [facility]              . ..[phone]  [facility]              .    ..[phone]  [facility]              .     .. [phone]  [facility]                 ... [phone]  [facility]                         .[phone]  [facility]     .      . ..[phone]            [facility]              ..  [phone]                   [facility]             . ... ..[phone]  [facility]              .      ..[phone]  [facility]              .     ..[phone]  [facility]        .      . [phone]  [facility]           .[phone]  [facility]         .    ..[phone]  [facility]             . ..  ..  [phone]  [facility]            [phone]  [facility]              ..[phone]  [facility]                     [phone]    [facility]           ..     [phone]     [facility]    ...     ...[phone]     [place]  [facility]            [phone]                                                  [place]  [facility]                ..  .  [phone]  [facility]         .     .  [phone]  [facility]              . [phone]  [facility]  ..        [phone]  [facility]              .    [phone]    [facility]               ...  .  [phone]  [facility]             . [phone]  [facility]            [phone]  [facility]     .      . ..[phone]         *Facilities in bold italics indicate medication management  Services are offered.     Crisis support  Mobile crisis line- [phone]  [facility]: Free online resources including therapy for Mental Health and substance use problems: Http://thriverange.org     Crisis Text Line  Text HOME to 730-487 - The Crisis Text Line serves anyone, in any type of crisis, providing access to free, 24/7 support and information via the medium people already use and trust  http://www.crisistextline.org   Here's how it works:  Text HOME to 831-220 from anywhere in the [place], anytime, about any type of crisis.  A live, trained Crisis Counselor receives the text and responds quickly.  The volunteer Crisis Counselor will help you move from a 'hot moment to a cool moment'

## 2022-09-29 NOTE — NURSING NOTE
"Chief Complaint   Patient presents with     Physical       Initial /66 (BP Location: Right arm, Patient Position: Sitting, Cuff Size: Adult Regular)   Pulse 79   Temp (!) 96.6  F (35.9  C) (Tympanic)   Resp 16   Ht 1.626 m (5' 4\")   Wt 61.6 kg (135 lb 12.8 oz)   SpO2 98%   BMI 23.31 kg/m   Estimated body mass index is 23.31 kg/m  as calculated from the following:    Height as of this encounter: 1.626 m (5' 4\").    Weight as of this encounter: 61.6 kg (135 lb 12.8 oz).  Medication Reconciliation: complete  Marylou Weldon MA  "

## 2022-10-04 DIAGNOSIS — E11.65 TYPE 2 DIABETES MELLITUS WITH HYPERGLYCEMIA, WITHOUT LONG-TERM CURRENT USE OF INSULIN (H): ICD-10-CM

## 2022-10-04 NOTE — TELEPHONE ENCOUNTER
Easy Touch Lancets      Last Written Prescription Date:  12/9/21  Last Fill Quantity: 100,   # refills: 2  Last Office Visit: 9/29/22  Future Office visit:       Routing refill request to provider for review/approval because:

## 2022-10-05 RX ORDER — LANCETS 28 GAUGE
EACH MISCELLANEOUS
Qty: 100 EACH | Refills: 3 | Status: SHIPPED | OUTPATIENT
Start: 2022-10-05 | End: 2023-05-11

## 2022-12-08 NOTE — PROGRESS NOTES
SUBJECTIVE:   Aki is a 66 year old who presents for Preventive Visit.    Patient has been advised of split billing requirements and indicates understanding: Yes  Are you in the first 12 months of your Medicare coverage?  No    HPI    Have you ever done Advance Care Planning? (For example, a Health Directive, POLST, or a discussion with a medical provider or your loved ones about your wishes): No, advance care planning information given to patient to review.  Patient declined advance care planning discussion at this time.      Fall risk  Fallen 2 or more times in the past year?: No  Any fall with injury in the past year?: No    Cognitive Screening Not appropriate due to mental handicap    Do you have sleep apnea, excessive snoring or daytime drowsiness?: no    Reviewed and updated as needed this visit by clinical staff   Tobacco  Allergies  Meds  Problems  Med Hx  Surg Hx  Fam Hx  Soc   Hx          Reviewed and updated as needed this visit by Provider   Tobacco  Allergies  Meds  Problems  Med Hx  Surg Hx  Fam Hx           Social History     Tobacco Use     Smoking status: Never Smoker     Smokeless tobacco: Never Used   Substance Use Topics     Alcohol use: Yes     Comment: 1 beer occasionally     If you drink alcohol do you typically have >3 drinks per day or >7 drinks per week? No    Alcohol Use 9/29/2022   Prescreen: >3 drinks/day or >7 drinks/week? No       Diabetes Follow-up    How often are you checking your blood sugar? One time daily  What time of day are you checking your blood sugars (select all that apply)?  Before meals  Have you had any blood sugars above 200?  No  Have you had any blood sugars below 70?  No    What symptoms do you notice when your blood sugar is low?  Not applicable    What concerns do you have today about your diabetes? None        BP Readings from Last 2 Encounters:   09/29/22 106/66   09/02/21 126/66     Hemoglobin A1C (%)   Date Value   09/29/2022 6.4 (H)    03/03/2022 6.2 (H)   11/16/2020 6.4 (H)   02/12/2020 6.9 (H)     LDL Cholesterol Calculated (mg/dL)   Date Value   09/29/2022 38   03/03/2022 34   11/16/2020 47   02/12/2020 21       Hyperlipidemia Follow-Up      Are you regularly taking any medication or supplement to lower your cholesterol?   Yes- simvastatin    Are you having muscle aches or other side effects that you think could be caused by your cholesterol lowering medication?  No    Hypertension Follow-up      Do you check your blood pressure regularly outside of the clinic? No     Are you following a low salt diet? No    Are your blood pressures ever more than 140 on the top number (systolic) OR more   than 90 on the bottom number (diastolic), for example 140/90? No    Chronic Kidney Disease Follow-up    Do you take any over the counter pain medicine?: No      Current providers sharing in care for this patient include:   Patient Care Team:  Nicole Rojas MD as PCP - General  Nicole Rojas MD as Assigned PCP    The following health maintenance items are reviewed in Epic and correct as of today:  Health Maintenance   Topic Date Due     HIV SCREENING  Never done     ZOSTER IMMUNIZATION (1 of 2) Never done     DIABETIC FOOT EXAM  05/24/2019     COLORECTAL CANCER SCREENING  03/07/2021     PHQ-2 (once per calendar year)  01/01/2022     EYE EXAM  08/24/2022     INFLUENZA VACCINE (1) 09/01/2022     COVID-19 Vaccine (5 - Booster for Moderna series) 09/05/2022     DTAP/TDAP/TD IMMUNIZATION (3 - Td or Tdap) 09/19/2022     Pneumococcal Vaccine: Pediatrics (0 to 5 Years) and At-Risk Patients (6 to 64 Years) (3 - PPSV23 or PCV20) 10/29/2022     A1C  03/29/2023     MEDICARE ANNUAL WELLNESS VISIT  09/29/2023     BMP  09/29/2023     LIPID  09/29/2023     MICROALBUMIN  09/29/2023     ADVANCE CARE PLANNING  09/29/2027     HEPATITIS C SCREENING  Completed     URINALYSIS  Completed     IPV IMMUNIZATION  Aged Out     MENINGITIS IMMUNIZATION  Aged Out     Patient  Active Problem List   Diagnosis     Advanced care planning/counseling discussion     Diabetes mellitus, type 2 (H)     Essential hypertension, benign     Hyperlipidemia     Intellectual disability     Arthritis     Chronic kidney disease, stage 1     Past Surgical History:   Procedure Laterality Date     MOUTH SURGERY  2008     undescended testicle surgery         Social History     Tobacco Use     Smoking status: Never Smoker     Smokeless tobacco: Never Used   Substance Use Topics     Alcohol use: Yes     Comment: 1 beer occasionally     Family History   Problem Relation Age of Onset     Cancer Mother         brain cancer - cause of death     Diabetes Mother 60     Cancer Father 76        renal cancer - cause of death     Diabetes Sister 50         Current Outpatient Medications   Medication Sig Dispense Refill     ACETAMINOPHEN PO Take 325 mg by mouth as needed for pain (4-6 hours)        cetirizine (ZYRTEC) 10 MG tablet TAKE ONE (1) TABLET BY MOUTH DAILY 30 tablet 11     chlorhexidine (PERIDEX) 0.12 % solution RINSE WITH 15 MLS ORALLY TWICE DAILY 473 mL 11     Easy Touch Lancets 28G/Twist MISC Use to test blood sugar twice daily 100 each 2     EASY TOUCH TEST test strip USE TO test blood sugar TWICE DAILY 60 strip 7     famotidine (PEPCID) 40 MG tablet Take 1 tablet (40 mg) by mouth daily 30 tablet 11     glipiZIDE (GLUCOTROL XL) 5 MG 24 hr tablet Take 1 tablet (5 mg) by mouth daily 30 tablet 5     GNP ANTI-ITCH 0.5-0.5 % external lotion APPLY TO THE AFFECTED AREA(S) Topically AS DIRECTED AS NEEDED 222 mL 11     metFORMIN (GLUCOPHAGE) 1000 MG tablet TAKE ONE (1) TABLET BY MOUTH TWICE DAILY 60 tablet 5     Multiple Vitamins-Minerals (CENTROVITE) TABS TAKE ONE (1) TABLET BY MOUTH DAILY 100 tablet 3     order for DME Equipment being ordered: Diabetic shoes dx: DM type 2, controlled, history of pressure wound 1 Device 0     order for DME Equipment being ordered: Wheeled walker with seat    Patient has frequent  "falls, and need to rest often (needs seat) 1 Device 0     order for DME Equipment being ordered: 4 wheeled Walker with Seat 1 Units 0     order for DME Equipment being ordered: order for DME   Sig: Equipment being ordered: Diabetic shoes dx: DM type 2, controlled, last visit 2.18.18 , poor circulation as comorbidity 1 Device 0     ramipril (ALTACE) 5 MG capsule TAKE ONE (1) CAPSULE BY MOUTH DAILY 30 capsule 5     simvastatin (ZOCOR) 10 MG tablet TAKE ONE (1) TABLET BY MOUTH DAILY 90 tablet 3     triamcinolone (KENALOG) 0.1 % external cream APPLY TO AFFECTED AREA(S) TOPICALLY TWICE DAILY 80 g 11     Allergies   Allergen Reactions     Tape [Adhesive Tape]            Review of Systems  Constitutional, HEENT, cardiovascular, pulmonary, gi and gu systems are negative, except as otherwise noted.    OBJECTIVE:   /66 (BP Location: Right arm, Patient Position: Sitting, Cuff Size: Adult Regular)   Pulse 79   Temp (!) 96.6  F (35.9  C) (Tympanic)   Resp 16   Ht 1.626 m (5' 4\")   Wt 61.6 kg (135 lb 12.8 oz)   SpO2 98%   BMI 23.31 kg/m   Estimated body mass index is 23.31 kg/m  as calculated from the following:    Height as of this encounter: 1.626 m (5' 4\").    Weight as of this encounter: 61.6 kg (135 lb 12.8 oz).  Physical Exam  GENERAL: alert and no distress  EYES: Eyes grossly normal to inspection  HENT: right ear: nonhealing wound on lower ear lobe  RESP: lungs clear to auscultation - no rales, rhonchi or wheezes  CV: regular rates and rhythm, normal S1 S2, no S3 or S4 and no murmur, click or rub  MS: no edema  SKIN: as above (ear)  PSYCH: affect flat    Diagnostic Test Results:  See orders    ASSESSMENT / PLAN:       ICD-10-CM    1. Routine general medical examination at a health care facility  Z00.00    2. Intellectual disability  F79    3. Type 2 diabetes mellitus with hyperglycemia, without long-term current use of insulin (H)  E11.65 Albumin Random Urine Quantitative with Creat Ratio     Hemoglobin A1c    " " TSH with free T4 reflex     Hemoglobin A1c     TSH with free T4 reflex     Albumin Random Urine Quantitative with Creat Ratio   4. Hyperlipidemia, unspecified hyperlipidemia type  E78.5 Lipid Profile     Comprehensive metabolic panel     Lipid Profile     Comprehensive metabolic panel   5. Essential hypertension, benign  I10 CBC with platelets     Comprehensive metabolic panel     CBC with platelets     Comprehensive metabolic panel   6. Chronic kidney disease, stage 1  N18.1 Comprehensive metabolic panel     Comprehensive metabolic panel   7. Colon cancer screening  Z12.11    8. Screening for malignant neoplasm of prostate  Z12.5 PSA, screen     PSA, screen         COUNSELING:  Reviewed preventive health counseling, as reflected in patient instructions    Estimated body mass index is 23.31 kg/m  as calculated from the following:    Height as of this encounter: 1.626 m (5' 4\").    Weight as of this encounter: 61.6 kg (135 lb 12.8 oz).        He reports that he has never smoked. He has never used smokeless tobacco.      Appropriate preventive services were discussed with this patient, including applicable screening as appropriate for cardiovascular disease, diabetes, osteopenia/osteoporosis, and glaucoma.  As appropriate for age/gender, discussed screening for colorectal cancer, prostate cancer, breast cancer, and cervical cancer. Checklist reviewing preventive services available has been given to the patient.    Reviewed patients plan of care and provided an AVS. The Basic Care Plan (routine screening as documented in Health Maintenance) for Aki meets the Care Plan requirement. This Care Plan has been established and reviewed with the Patient and caregiver.    Counseling Resources:  ATP IV Guidelines  Pooled Cohorts Equation Calculator  Breast Cancer Risk Calculator  Breast Cancer: Medication to Reduce Risk  FRAX Risk Assessment  ICSI Preventive Guidelines  Dietary Guidelines for Americans, 2010  USDA's " MyPlate  ASA Prophylaxis  Lung CA Screening    Nicole Rojas MD  [facility] [place] [facility]    Identified Health Risks:   on the discharge service for the patient. I have reviewed and made amendments to the documentation where necessary.

## 2022-12-16 DIAGNOSIS — L50.9 HIVES: ICD-10-CM

## 2022-12-16 RX ORDER — CETIRIZINE HYDROCHLORIDE 10 MG/1
TABLET ORAL
Qty: 30 TABLET | Refills: 11 | Status: SHIPPED | OUTPATIENT
Start: 2022-12-16 | End: 2023-12-14

## 2022-12-16 NOTE — TELEPHONE ENCOUNTER
Zyrtec       Last Written Prescription Date:  12/21/21  Last Fill Quantity: 30,   # refills: 11  Last Office Visit: 9/29/22  Future Office visit:

## 2022-12-27 DIAGNOSIS — Z00.00 ENCOUNTER FOR GENERAL ADULT MEDICAL EXAMINATION WITHOUT ABNORMAL FINDINGS: ICD-10-CM

## 2022-12-28 RX ORDER — MULTIVITAMIN/IRON/FOLIC ACID 18MG-0.4MG
TABLET ORAL
Qty: 100 TABLET | Refills: 3 | Status: SHIPPED | OUTPATIENT
Start: 2022-12-28

## 2022-12-28 NOTE — TELEPHONE ENCOUNTER
One Daily Plus Iron 18 mg-400 mcg tablet        Last Written Prescription Date:  1/11/22 ([facility]arm[facility])  Last Fill Quantity: 100,   # refills: 3  Last Office Visit: 9/29/22  Future Office visit:       Routing refill request to provider for review/approval because:    Vitamin Supplements (Adult) Protocol Failed 12/27/2022 03:58 PM   Protocol Details  Medication is active on med list

## 2023-01-24 DIAGNOSIS — Z00.00 HEALTHCARE MAINTENANCE: ICD-10-CM

## 2023-01-24 DIAGNOSIS — L23.7 CONTACT DERMATITIS DUE TO POISON IVY: ICD-10-CM

## 2023-01-25 RX ORDER — TRIAMCINOLONE ACETONIDE 1 MG/G
CREAM TOPICAL
Qty: 80 G | Refills: 11 | Status: SHIPPED | OUTPATIENT
Start: 2023-01-25 | End: 2024-01-29

## 2023-01-25 RX ORDER — CHLORHEXIDINE GLUCONATE ORAL RINSE 1.2 MG/ML
SOLUTION DENTAL
Qty: 473 ML | Refills: 11 | Status: SHIPPED | OUTPATIENT
Start: 2023-01-25 | End: 2023-09-22

## 2023-01-25 NOTE — TELEPHONE ENCOUNTER
Peridex       Last Written Prescription Date:  6/9/22  Last Fill Quantity: 473mL,   # refills: 11    Kenalog       Last Written Prescription Date:  3/4/22  Last Fill Quantity: 80g,   # refills: 11  Last Office Visit: 9/29/22  Future Office visit:    Next 5 appointments (look out 90 days)    Mar 30, 2023  9:45 AM  (Arrive by 9:30 AM)  SHORT with Nicole Rojas MD  [facility] ([facility] ) [address]  [place] [address]  [phone]

## 2023-02-14 DIAGNOSIS — K21.9 GASTROESOPHAGEAL REFLUX DISEASE, UNSPECIFIED WHETHER ESOPHAGITIS PRESENT: ICD-10-CM

## 2023-02-14 DIAGNOSIS — E78.2 MIXED HYPERLIPIDEMIA: ICD-10-CM

## 2023-02-15 NOTE — TELEPHONE ENCOUNTER
pepcid      Last Written Prescription Date:  2/21/22  Last Fill Quantity: 30,   # refills: 11  Last Office Visit: 9/29/22  Future Office visit:    Next 5 appointments (look out 90 days)    Mar 30, 2023  9:45 AM  (Arrive by 9:30 AM)  SHORT with Nicole Rojas MD  [facility] ([facility] ) [address]  [facility] [address]  [phone]         zocor      Last Written Prescription Date:  2/21/22  Last Fill Quantity: 90,   # refills: 3  Last Office Visit: 9/29/22  Future Office visit:

## 2023-02-16 RX ORDER — FAMOTIDINE 40 MG/1
40 TABLET, FILM COATED ORAL DAILY
Qty: 90 TABLET | Refills: 1 | Status: SHIPPED | OUTPATIENT
Start: 2023-02-16 | End: 2023-08-15

## 2023-02-16 RX ORDER — SIMVASTATIN 10 MG
TABLET ORAL
Qty: 90 TABLET | Refills: 1 | Status: SHIPPED | OUTPATIENT
Start: 2023-02-16 | End: 2023-08-15

## 2023-03-13 DIAGNOSIS — E11.9 TYPE 2 DIABETES MELLITUS WITHOUT COMPLICATION, WITHOUT LONG-TERM CURRENT USE OF INSULIN (H): ICD-10-CM

## 2023-03-13 DIAGNOSIS — I10 ESSENTIAL HYPERTENSION: ICD-10-CM

## 2023-03-15 RX ORDER — RAMIPRIL 5 MG/1
CAPSULE ORAL
Qty: 30 CAPSULE | Refills: 0 | Status: SHIPPED | OUTPATIENT
Start: 2023-03-15 | End: 2023-04-17

## 2023-03-15 RX ORDER — GLIPIZIDE 5 MG/1
TABLET, FILM COATED, EXTENDED RELEASE ORAL
Qty: 30 TABLET | Refills: 0 | Status: SHIPPED | OUTPATIENT
Start: 2023-03-15 | End: 2023-04-17

## 2023-03-30 ENCOUNTER — OFFICE VISIT (OUTPATIENT)
Dept: FAMILY MEDICINE | Facility: OTHER | Age: 68
End: 2023-03-30
Attending: FAMILY MEDICINE
Payer: COMMERCIAL

## 2023-03-30 VITALS
OXYGEN SATURATION: 98 % | TEMPERATURE: 97.3 F | DIASTOLIC BLOOD PRESSURE: 62 MMHG | SYSTOLIC BLOOD PRESSURE: 116 MMHG | HEIGHT: 64 IN | BODY MASS INDEX: 25.16 KG/M2 | HEART RATE: 87 BPM | WEIGHT: 147.4 LBS | RESPIRATION RATE: 16 BRPM
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
## 2023-03-30 DIAGNOSIS — Z53.20 COLON CANCER SCREENING DECLINED: ICD-10-CM

## 2023-03-30 DIAGNOSIS — N18.1 CHRONIC KIDNEY DISEASE, STAGE 1: ICD-10-CM

## 2023-03-30 DIAGNOSIS — I10 ESSENTIAL HYPERTENSION, BENIGN: ICD-10-CM

## 2023-03-30 DIAGNOSIS — M19.90 ARTHRITIS: ICD-10-CM

## 2023-03-30 DIAGNOSIS — E11.9 TYPE 2 DIABETES MELLITUS WITHOUT COMPLICATION, WITHOUT LONG-TERM CURRENT USE OF INSULIN (H): Primary | ICD-10-CM
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
## 2023-03-30 DIAGNOSIS — E78.5 HYPERLIPIDEMIA, UNSPECIFIED HYPERLIPIDEMIA TYPE: ICD-10-CM

## 2023-03-30 DIAGNOSIS — L30.9 DERMATITIS: ICD-10-CM

## 2023-03-30 LAB
ALT SERPL W P-5'-P-CCNC: 21 U/L (ref 10–50)
ANION GAP SERPL CALCULATED.3IONS-SCNC: 11 MMOL/L (ref 7–15)
BUN SERPL-MCNC: 25.1 MG/DL (ref 8–23)
CALCIUM SERPL-MCNC: 9.2 MG/DL (ref 8.8–10.2)
CHLORIDE SERPL-SCNC: 101 MMOL/L (ref 98–107)
CHOLEST SERPL-MCNC: 104 MG/DL
CREAT SERPL-MCNC: 0.83 MG/DL (ref 0.67–1.17)
DEPRECATED HCO3 PLAS-SCNC: 27 MMOL/L (ref 22–29)
EST. AVERAGE GLUCOSE BLD GHB EST-MCNC: 140 MG/DL
GFR SERPL CREATININE-BSD FRML MDRD: >90 ML/MIN/1.73M2
GLUCOSE SERPL-MCNC: 65 MG/DL (ref 70–99)
HBA1C MFR BLD: 6.5 %
HDLC SERPL-MCNC: 43 MG/DL
HOLD SPECIMEN: NORMAL
LDLC SERPL CALC-MCNC: 39 MG/DL
NONHDLC SERPL-MCNC: 61 MG/DL
POTASSIUM SERPL-SCNC: 4.4 MMOL/L (ref 3.4–5.3)
SODIUM SERPL-SCNC: 139 MMOL/L (ref 136–145)
TRIGL SERPL-MCNC: 109 MG/DL

## 2023-03-30 PROCEDURE — 80061 LIPID PANEL: CPT | Mod: ZL | Performed by: FAMILY MEDICINE

## 2023-03-30 PROCEDURE — G0463 HOSPITAL OUTPT CLINIC VISIT: HCPCS

## 2023-03-30 PROCEDURE — 80048 BASIC METABOLIC PNL TOTAL CA: CPT | Mod: ZL | Performed by: FAMILY MEDICINE

## 2023-03-30 PROCEDURE — 83036 HEMOGLOBIN GLYCOSYLATED A1C: CPT | Mod: ZL | Performed by: FAMILY MEDICINE

## 2023-03-30 PROCEDURE — 84460 ALANINE AMINO (ALT) (SGPT): CPT | Mod: ZL | Performed by: FAMILY MEDICINE

## 2023-03-30 PROCEDURE — 99214 OFFICE O/P EST MOD 30 MIN: CPT | Performed by: FAMILY MEDICINE

## 2023-03-30 PROCEDURE — 36415 COLL VENOUS BLD VENIPUNCTURE: CPT | Mod: ZL | Performed by: FAMILY MEDICINE

## 2023-03-30 RX ORDER — CELECOXIB 200 MG/1
200 CAPSULE ORAL DAILY
Qty: 30 CAPSULE | Refills: 5 | Status: SHIPPED | OUTPATIENT
Start: 2023-03-30 | End: 2023-09-14

## 2023-03-30 RX ORDER — MINERAL OIL/HYDROPHIL PETROLAT
OINTMENT (GRAM) TOPICAL PRN
Qty: 396 G | Refills: 1 | Status: SHIPPED | OUTPATIENT
Start: 2023-03-30

## 2023-03-30 ASSESSMENT — PAIN SCALES - GENERAL: PAINLEVEL: NO PAIN (0)

## 2023-03-30 NOTE — LETTER
March 31, 2023      Aki DUVALL Ramon  [address] [facility] [address]        Dear Edgardmarci,    We are writing to inform you of your test results.    Normal/stable labs, no medication changes recommended.       Resulted Orders   ALT   Result Value Ref Range    ALT 21 10 - 50 U/L   Basic metabolic panel   Result Value Ref Range    Sodium 139 136 - 145 mmol/L    Potassium 4.4 3.4 - 5.3 mmol/L    Chloride 101 98 - 107 mmol/L    Carbon Dioxide (CO2) 27 22 - 29 mmol/L    Anion Gap 11 7 - 15 mmol/L    Urea Nitrogen 25.1 (H) 8.0 - 23.0 mg/dL    Creatinine 0.83 0.67 - 1.17 mg/dL    Calcium 9.2 8.8 - 10.2 mg/dL    Glucose 65 (L) 70 - 99 mg/dL    GFR Estimate >90 >60 mL/min/1.73m2      Comment:      eGFR calculated using 2021 CKD-EPI equation.   Hemoglobin A1c   Result Value Ref Range    Estimated Average Glucose 140 mg/dL    Hemoglobin A1C 6.5 (H) <5.7 %      Comment:      Normal <5.7%   Prediabetes 5.7-6.4%    Diabetes 6.5% or higher     Note: Adopted from ADA consensus guidelines.   Lipid Profile   Result Value Ref Range    Cholesterol 104 <200 mg/dL    Triglycerides 109 <150 mg/dL    Direct Measure HDL 43 >=40 mg/dL    LDL Cholesterol Calculated 39 <=100 mg/dL    Non HDL Cholesterol 61 <130 mg/dL    Narrative    Cholesterol  Desirable:  <200 mg/dL    Triglycerides  Normal:  Less than 150 mg/dL  Borderline High:  150-199 mg/dL  High:  200-499 mg/dL  Very High:  Greater than or equal to 500 mg/dL    Direct Measure HDL  Female:  Greater than or equal to 50 mg/dL   Male:  Greater than or equal to 40 mg/dL    LDL Cholesterol  Desirable:  <100mg/dL  Above Desirable:  100-129 mg/dL   Borderline High:  130-159 mg/dL   High:  160-189 mg/dL   Very High:  >= 190 mg/dL    Non HDL Cholesterol  Desirable:  130 mg/dL  Above Desirable:  130-159 mg/dL  Borderline High:  160-189 mg/dL  High:  190-219 mg/dL  Very High:  Greater than or equal to 220 mg/dL       If you have any questions or concerns, please call the clinic at the number  listed above.       Sincerely,      Nicole Rojas MD

## 2023-03-30 NOTE — PROGRESS NOTES
"  Assessment & Plan     1. Type 2 diabetes mellitus without complication, without long-term current use of insulin (H)  Labs updated, will make adjustments as needed.  - Hemoglobin A1c; Future  - Hemoglobin A1c    2. Hyperlipidemia, unspecified hyperlipidemia type  As above   - ALT; Future  - Lipid Profile; Future  - ALT  - Lipid Profile    3. Essential hypertension, benign  - Basic metabolic panel; Future  - Basic metabolic panel    4. Chronic kidney disease, stage 1  No changes    5. Fall in home, initial encounter    6. Arthritis  Will start daily medication to help with perceived arthritis pain  - celecoxib (CELEBREX) 200 MG capsule; Take 1 capsule (200 mg) by mouth daily  Dispense: 30 capsule; Refill: 5    7. Dermatitis  Sent as a prescription  - mineral oil-hydrophilic petrolatum (AQUAPHOR) external ointment; Apply topically as needed  Dispense: 396 g; Refill: 1    8. Colon cancer screening declined  Unable to collect sample and will not cooperate with prep        BMI:   Estimated body mass index is 25.3 kg/m  as calculated from the following:    Height as of this encounter: 1.626 m (5' 4\").    Weight as of this encounter: 66.9 kg (147 lb 6.4 oz).     Return in about 6 months (around 9/30/2023) for Physical Exam, Diabetic Follow-up, Chronic Disease Management, Medication review.    Nicole Rojas MD  [facility] Prabhakar is a 67 year old, presenting for the following health issues:  Diabetes, Lipids, and Hypertension  No flowsheet data found.  HPI     Diabetes Follow-up    How often are you checking your blood sugar? One time daily  What time of day are you checking your blood sugars (select all that apply)?  Before meals  Have you had any blood sugars above 200?  No  Have you had any blood sugars below 70?  No    What symptoms do you notice when your blood sugar is low?  None    What concerns do you have today about your diabetes? None     Do you have any of these " "symptoms? (Select all that apply)  No numbness or tingling in feet.  No redness, sores or blisters on feet.  No complaints of excessive thirst.  No reports of blurry vision.  No significant changes to weight.    Have you had a diabetic eye exam in the last 12 months? No      Hyperlipidemia Follow-Up      Are you regularly taking any medication or supplement to lower your cholesterol?   Yes- zocor    Are you having muscle aches or other side effects that you think could be caused by your cholesterol lowering medication?  No    Hypertension Follow-up      Do you check your blood pressure regularly outside of the clinic? No     Are you following a low salt diet? Yes    Are your blood pressures ever more than 140 on the top number (systolic) OR more   than 90 on the bottom number (diastolic), for example 140/90? No    BP Readings from Last 2 Encounters:   03/30/23 116/62   09/29/22 106/66     Hemoglobin A1C (%)   Date Value   03/30/2023 6.5 (H)   09/29/2022 6.4 (H)   11/16/2020 6.4 (H)   02/12/2020 6.9 (H)     LDL Cholesterol Calculated (mg/dL)   Date Value   03/30/2023 39   09/29/2022 38   11/16/2020 47   02/12/2020 21         Patient has fallen a few times, never witness, but he has been found on the floor.  Staff feels that maybe his arthritis is acting up and he is feeling more pain.  He has known arthritis and he is not on anything for pain.    They have tried numerous times to collect a stool sample for the Cologuard test, but patient is not cooperative.  He will not drink the colonoscopy prep either.      Review of Systems   Constitutional, HEENT, cardiovascular, pulmonary, gi and gu systems are negative, except as otherwise noted.      Objective    /62 (BP Location: Right arm, Patient Position: Sitting, Cuff Size: Adult Regular)   Pulse 87   Temp 97.3  F (36.3  C) (Tympanic)   Resp 16   Ht 1.626 m (5' 4\")   Wt 66.9 kg (147 lb 6.4 oz)   SpO2 98%   BMI 25.30 kg/m    Body mass index is 25.3 " kg/m .  Physical Exam   GENERAL: alert and no distress  PSYCH: affect normal  Right at the end of the appointment, patient became impatient and got up to leave the room.  He usually indicates he has to use the bathroom, which is his way of saying he is done with the appointment.

## 2023-04-12 ENCOUNTER — PATIENT OUTREACH (OUTPATIENT)
Dept: OTHER | Facility: HOSPITAL | Age: 68
End: 2023-04-12

## 2023-04-12 NOTE — TELEPHONE ENCOUNTER
Patient Quality Outreach    Patient is due for the following:   Colon Cancer Screening    Next Steps:   No follow up needed at this time.  Per Dr. Nath's documentation on 3/30/2023: Unable to collect sample and will not cooperate with prep.     Type of outreach:    PCP addressed on 3/30/2023.      Questions for provider review:    None     Devora Palomares RN

## 2023-04-15 DIAGNOSIS — I10 ESSENTIAL HYPERTENSION: ICD-10-CM

## 2023-04-15 DIAGNOSIS — E11.9 TYPE 2 DIABETES MELLITUS WITHOUT COMPLICATION, WITHOUT LONG-TERM CURRENT USE OF INSULIN (H): ICD-10-CM

## 2023-04-17 RX ORDER — GLIPIZIDE 5 MG/1
TABLET, FILM COATED, EXTENDED RELEASE ORAL
Qty: 30 TABLET | Refills: 5 | Status: SHIPPED | OUTPATIENT
Start: 2023-04-17 | End: 2023-10-17

## 2023-04-17 RX ORDER — RAMIPRIL 5 MG/1
CAPSULE ORAL
Qty: 30 CAPSULE | Refills: 5 | Status: SHIPPED | OUTPATIENT
Start: 2023-04-17 | End: 2023-10-17

## 2023-04-17 NOTE — TELEPHONE ENCOUNTER
glipiZIDE (GLUCOTROL XL) 5 MG 24 hr tablet    metFORMIN (GLUCOPHAGE) 1000 MG tablet    ramipril (ALTACE) 5 MG capsule        Last Written Prescription Date:  3-15-23  Last Fill Quantity: 1 MONTH,   # refills: 0  Last Office Visit: 3-30-23  Future Office visit:       Routing refill request to provider for review/approval because:  3 MEDICATIONS

## 2023-05-09 ENCOUNTER — TELEPHONE (OUTPATIENT)
Dept: FAMILY MEDICINE | Facility: OTHER | Age: 68
End: 2023-05-09

## 2023-05-09 DIAGNOSIS — E11.9 TYPE 2 DIABETES MELLITUS WITHOUT COMPLICATION, WITHOUT LONG-TERM CURRENT USE OF INSULIN (H): Primary | ICD-10-CM

## 2023-05-09 NOTE — TELEPHONE ENCOUNTER
Patient's nurse at the [facility] said the pharmacy ([facility]) told her that the Easy Strip test strips are no longer covered by his insurance.  He need's a new monitor and tests strips in a different brand.      EASY TOUCH TEST test strip 60 strip 7 4/26/2023  No   Sig: USE TO test blood sugar TWICE DAILY   Sent to pharmacy as: Easy Touch Test In Vitro Strip (blood glucose)   Class: E-Prescribe   Order: [id number]   E-Prescribing Status: Receipt confirmed by pharmacy (4/26/2023  2:51 PM CDT)

## 2023-05-11 RX ORDER — BLOOD-GLUCOSE METER
EACH MISCELLANEOUS
Qty: 1 KIT | Refills: 0 | Status: SHIPPED | OUTPATIENT
Start: 2023-05-11

## 2023-07-13 DIAGNOSIS — L30.9 DERMATITIS: ICD-10-CM

## 2023-07-17 RX ORDER — CAMPHOR, MENTHOL .5; .5 G/100G; G/100G
LOTION TOPICAL
Qty: 222 ML | Refills: 4 | Status: SHIPPED | OUTPATIENT
Start: 2023-07-17 | End: 2024-04-24

## 2023-07-17 NOTE — TELEPHONE ENCOUNTER
Anti-Itch Lotion      Last Written Prescription Date:  4.4.23  Last Fill Quantity: #222mL,   # refills: 0  Last Office Visit: 3.30.23  Future Office visit:    Next 5 appointments (look out 90 days)    Oct 02, 2023  9:00 AM  (Arrive by 8:45 AM)  PHYSICAL with Nicole Rojas MD  [facility] ([facility] ) [address]  [facility] [address]  [phone]           Routing refill request to provider for review/approval because:  Drug not on the [facility], P or [facility] refill protocol or controlled substance

## 2023-08-02 DIAGNOSIS — Z00.00 HEALTHCARE MAINTENANCE: ICD-10-CM

## 2023-08-02 NOTE — TELEPHONE ENCOUNTER
Multiple Vitamins-Minerals (Centrovite) tablets    Last Written Prescription Date:  01/11/2022  Last Fill Quantity: 100,   # refills: 3  Last Office Visit: 03/30/2023

## 2023-08-04 RX ORDER — GLUCOSA SU 2KCL/CHONDROITIN SU 500-400 MG
CAPSULE ORAL
Qty: 100 TABLET | Refills: 3 | Status: SHIPPED | OUTPATIENT
Start: 2023-08-04 | End: 2024-09-19

## 2023-08-04 NOTE — TELEPHONE ENCOUNTER
Current Epic med list MTV one daily plus iron.    Last filled 100 with #3 . LOV 3.30.2023.    Pended.    Nilam DEGROOT RN

## 2023-08-14 DIAGNOSIS — K21.9 GASTROESOPHAGEAL REFLUX DISEASE, UNSPECIFIED WHETHER ESOPHAGITIS PRESENT: ICD-10-CM

## 2023-08-14 DIAGNOSIS — E78.2 MIXED HYPERLIPIDEMIA: ICD-10-CM

## 2023-08-15 RX ORDER — SIMVASTATIN 10 MG
TABLET ORAL
Qty: 90 TABLET | Refills: 1 | Status: SHIPPED | OUTPATIENT
Start: 2023-08-15 | End: 2024-02-13

## 2023-08-15 RX ORDER — FAMOTIDINE 40 MG/1
40 TABLET, FILM COATED ORAL DAILY
Qty: 90 TABLET | Refills: 1 | Status: SHIPPED | OUTPATIENT
Start: 2023-08-15 | End: 2024-02-13

## 2023-08-26 DIAGNOSIS — Z00.00 HEALTHCARE MAINTENANCE: ICD-10-CM

## 2023-08-28 RX ORDER — CHLORHEXIDINE GLUCONATE ORAL RINSE 1.2 MG/ML
SOLUTION DENTAL
Qty: 473 ML | Refills: 11 | OUTPATIENT
Start: 2023-08-28

## 2023-08-31 ENCOUNTER — TELEPHONE (OUTPATIENT)
Dept: FAMILY MEDICINE | Facility: OTHER | Age: 68
End: 2023-08-31

## 2023-08-31 DIAGNOSIS — R26.9 ABNORMAL GAIT: ICD-10-CM

## 2023-08-31 DIAGNOSIS — F79 INTELLECTUAL DISABILITY: Primary | ICD-10-CM

## 2023-09-12 ENCOUNTER — TRANSFERRED RECORDS (OUTPATIENT)
Dept: HEALTH INFORMATION MANAGEMENT | Facility: CLINIC | Age: 68
End: 2023-09-12

## 2023-09-12 LAB — RETINOPATHY: NORMAL

## 2023-09-13 DIAGNOSIS — M19.90 ARTHRITIS: ICD-10-CM

## 2023-09-14 RX ORDER — CELECOXIB 200 MG/1
200 CAPSULE ORAL DAILY
Qty: 30 CAPSULE | Refills: 5 | Status: SHIPPED | OUTPATIENT
Start: 2023-09-14 | End: 2024-03-12

## 2023-09-14 NOTE — TELEPHONE ENCOUNTER
Celebrex      Last Written Prescription Date:  3/30/23  Last Fill Quantity: 30,   # refills: 5  Last Office Visit: 3/30/23  Future Office visit:    Next 5 appointments (look out 90 days)      Oct 02, 2023  9:00 AM  (Arrive by 8:45 AM)  PHYSICAL with Nicole Rojas MD  [facility] ([facility] ) [address]  [facility] [address]  [phone]             Routing refill request to provider for review/approval because:

## 2023-09-14 NOTE — TELEPHONE ENCOUNTER
NSAID Medications Failed      Patient is age 6-64 years      Normal CBC on file in past 12 months        Recent Labs   Lab Test 09/29/22  0958   WBC 5.0   RBC 4.14*   HGB 12.9*   HCT 37.1*

## 2023-09-21 ENCOUNTER — TRANSFERRED RECORDS (OUTPATIENT)
Dept: HEALTH INFORMATION MANAGEMENT | Facility: CLINIC | Age: 68
End: 2023-09-21

## 2023-09-21 DIAGNOSIS — Z00.00 HEALTHCARE MAINTENANCE: ICD-10-CM

## 2023-09-22 RX ORDER — CHLORHEXIDINE GLUCONATE ORAL RINSE 1.2 MG/ML
SOLUTION DENTAL
Qty: 473 ML | Refills: 0 | Status: SHIPPED | OUTPATIENT
Start: 2023-09-22 | End: 2023-10-30

## 2023-09-22 NOTE — TELEPHONE ENCOUNTER
Peridex      Last Written Prescription Date:  8.26.23  Last Fill Quantity: #473mL,   # refills: 0  Last Office Visit: 3.30.23  Future Office visit:    Next 5 appointments (look out 90 days)      Oct 02, 2023  9:00 AM  (Arrive by 8:45 AM)  PHYSICAL with Nicole Rojas MD  [facility] ([facility] ) [address]  [facility] [address]  [phone]             Routing refill request to provider for review/approval because:  Drug not on the [facility], [facility] or [facility] refill protocol or controlled substance

## 2023-09-28 ENCOUNTER — TELEPHONE (OUTPATIENT)
Dept: FAMILY MEDICINE | Facility: OTHER | Age: 68
End: 2023-09-28

## 2023-09-28 DIAGNOSIS — F79 INTELLECTUAL DISABILITY: Primary | ICD-10-CM

## 2023-09-28 DIAGNOSIS — R26.9 ABNORMAL GAIT: ICD-10-CM

## 2023-10-02 ENCOUNTER — OFFICE VISIT (OUTPATIENT)
Dept: FAMILY MEDICINE | Facility: OTHER | Age: 68
End: 2023-10-02
Attending: FAMILY MEDICINE
Payer: COMMERCIAL

## 2023-10-02 VITALS
BODY MASS INDEX: 25.97 KG/M2 | HEART RATE: 82 BPM | SYSTOLIC BLOOD PRESSURE: 110 MMHG | OXYGEN SATURATION: 98 % | TEMPERATURE: 97.2 F | DIASTOLIC BLOOD PRESSURE: 70 MMHG | WEIGHT: 151.3 LBS

## 2023-10-02 DIAGNOSIS — F79 INTELLECTUAL DISABILITY: ICD-10-CM

## 2023-10-02 DIAGNOSIS — E78.5 HYPERLIPIDEMIA, UNSPECIFIED HYPERLIPIDEMIA TYPE: ICD-10-CM

## 2023-10-02 DIAGNOSIS — Z13.6 SCREENING FOR AAA (ABDOMINAL AORTIC ANEURYSM): ICD-10-CM

## 2023-10-02 DIAGNOSIS — I10 ESSENTIAL HYPERTENSION, BENIGN: ICD-10-CM

## 2023-10-02 DIAGNOSIS — N18.1 CHRONIC KIDNEY DISEASE, STAGE 1: ICD-10-CM

## 2023-10-02 DIAGNOSIS — Z00.00 ROUTINE GENERAL MEDICAL EXAMINATION AT A HEALTH CARE FACILITY: Primary | ICD-10-CM

## 2023-10-02 DIAGNOSIS — E11.9 TYPE 2 DIABETES MELLITUS WITHOUT COMPLICATION, WITHOUT LONG-TERM CURRENT USE OF INSULIN (H): ICD-10-CM

## 2023-10-02 DIAGNOSIS — Z12.5 SCREENING FOR MALIGNANT NEOPLASM OF PROSTATE: ICD-10-CM

## 2023-10-02 LAB
ALBUMIN SERPL BCG-MCNC: 4.2 G/DL (ref 3.5–5.2)
ALP SERPL-CCNC: 77 U/L (ref 40–129)
ALT SERPL W P-5'-P-CCNC: 22 U/L (ref 0–70)
ANION GAP SERPL CALCULATED.3IONS-SCNC: 14 MMOL/L (ref 7–15)
AST SERPL W P-5'-P-CCNC: 17 U/L (ref 0–45)
BILIRUB SERPL-MCNC: 0.3 MG/DL
BUN SERPL-MCNC: 29.4 MG/DL (ref 8–23)
CALCIUM SERPL-MCNC: 9.4 MG/DL (ref 8.8–10.2)
CHLORIDE SERPL-SCNC: 100 MMOL/L (ref 98–107)
CHOLEST SERPL-MCNC: 124 MG/DL
CREAT SERPL-MCNC: 1.01 MG/DL (ref 0.67–1.17)
DEPRECATED HCO3 PLAS-SCNC: 25 MMOL/L (ref 22–29)
EGFRCR SERPLBLD CKD-EPI 2021: 83 ML/MIN/1.73M2
ERYTHROCYTE [DISTWIDTH] IN BLOOD BY AUTOMATED COUNT: 12.8 % (ref 10–15)
EST. AVERAGE GLUCOSE BLD GHB EST-MCNC: 146 MG/DL
GLUCOSE SERPL-MCNC: 150 MG/DL (ref 70–99)
HBA1C MFR BLD: 6.7 %
HCT VFR BLD AUTO: 40.7 % (ref 40–53)
HDLC SERPL-MCNC: 44 MG/DL
HGB BLD-MCNC: 13.9 G/DL (ref 13.3–17.7)
LDLC SERPL CALC-MCNC: 59 MG/DL
MCH RBC QN AUTO: 30.1 PG (ref 26.5–33)
MCHC RBC AUTO-ENTMCNC: 34.2 G/DL (ref 31.5–36.5)
MCV RBC AUTO: 88 FL (ref 78–100)
NONHDLC SERPL-MCNC: 80 MG/DL
PLATELET # BLD AUTO: 266 10E3/UL (ref 150–450)
POTASSIUM SERPL-SCNC: 4.4 MMOL/L (ref 3.4–5.3)
PROT SERPL-MCNC: 7.4 G/DL (ref 6.4–8.3)
PSA SERPL DL<=0.01 NG/ML-MCNC: 1.66 NG/ML (ref 0–4.5)
RBC # BLD AUTO: 4.62 10E6/UL (ref 4.4–5.9)
SODIUM SERPL-SCNC: 139 MMOL/L (ref 135–145)
TRIGL SERPL-MCNC: 105 MG/DL
TSH SERPL DL<=0.005 MIU/L-ACNC: 3.93 UIU/ML (ref 0.3–4.2)
WBC # BLD AUTO: 5.3 10E3/UL (ref 4–11)

## 2023-10-02 PROCEDURE — 85041 AUTOMATED RBC COUNT: CPT | Mod: ZL | Performed by: FAMILY MEDICINE

## 2023-10-02 PROCEDURE — G0463 HOSPITAL OUTPT CLINIC VISIT: HCPCS

## 2023-10-02 PROCEDURE — 80053 COMPREHEN METABOLIC PANEL: CPT | Mod: ZL | Performed by: FAMILY MEDICINE

## 2023-10-02 PROCEDURE — 99397 PER PM REEVAL EST PAT 65+ YR: CPT | Performed by: FAMILY MEDICINE

## 2023-10-02 PROCEDURE — 83036 HEMOGLOBIN GLYCOSYLATED A1C: CPT | Mod: ZL | Performed by: FAMILY MEDICINE

## 2023-10-02 PROCEDURE — 99213 OFFICE O/P EST LOW 20 MIN: CPT | Mod: 25 | Performed by: FAMILY MEDICINE

## 2023-10-02 PROCEDURE — 80061 LIPID PANEL: CPT | Mod: ZL | Performed by: FAMILY MEDICINE

## 2023-10-02 PROCEDURE — 84443 ASSAY THYROID STIM HORMONE: CPT | Mod: ZL | Performed by: FAMILY MEDICINE

## 2023-10-02 PROCEDURE — G0103 PSA SCREENING: HCPCS | Mod: ZL | Performed by: FAMILY MEDICINE

## 2023-10-02 PROCEDURE — 36415 COLL VENOUS BLD VENIPUNCTURE: CPT | Mod: ZL | Performed by: FAMILY MEDICINE

## 2023-10-02 ASSESSMENT — ENCOUNTER SYMPTOMS
WEAKNESS: 0
HEMATOCHEZIA: 0
HEMATURIA: 0
CHILLS: 0
ARTHRALGIAS: 0
HEARTBURN: 0
DIZZINESS: 0
NERVOUS/ANXIOUS: 0
JOINT SWELLING: 0
EYE PAIN: 0
HEADACHES: 0
FEVER: 0
ABDOMINAL PAIN: 0
PARESTHESIAS: 0
SHORTNESS OF BREATH: 0
SORE THROAT: 0
FREQUENCY: 0
NAUSEA: 0
COUGH: 0
PALPITATIONS: 0
DYSURIA: 0
CONSTIPATION: 0
MYALGIAS: 0
DIARRHEA: 0

## 2023-10-02 ASSESSMENT — ACTIVITIES OF DAILY LIVING (ADL)
CURRENT_FUNCTION: PREPARING MEALS REQUIRES ASSISTANCE
CURRENT_FUNCTION: SHOPPING REQUIRES ASSISTANCE
CURRENT_FUNCTION: BATHING REQUIRES ASSISTANCE
CURRENT_FUNCTION: HOUSEWORK REQUIRES ASSISTANCE
CURRENT_FUNCTION: LAUNDRY REQUIRES ASSISTANCE
CURRENT_FUNCTION: MEDICATION ADMINISTRATION REQUIRES ASSISTANCE
CURRENT_FUNCTION: NO ASSISTANCE NEEDED
CURRENT_FUNCTION: TRANSPORTATION REQUIRES ASSISTANCE
CURRENT_FUNCTION: TELEPHONE REQUIRES ASSISTANCE

## 2023-10-02 ASSESSMENT — PAIN SCALES - GENERAL: PAINLEVEL: NO PAIN (0)

## 2023-10-02 NOTE — PROGRESS NOTES
"SUBJECTIVE:   CC: Aki is an 67 year old who presents for preventative health visit.       10/2/2023     8:53 AM   Additional Questions   Roomed by Miroslava WALTON LPN   Accompanied by care taker/aide       Healthy Habits:     In general, how would you rate your overall health?  Good    Frequency of exercise:  None    Do you usually eat at least 4 servings of fruit and vegetables a day, include whole grains    & fiber and avoid regularly eating high fat or \"junk\" foods?  Yes    Taking medications regularly:  Yes    Medication side effects:  Not applicable and None    Ability to successfully perform activities of daily living:  Telephone requires assistance, transportation requires assistance, shopping requires assistance, preparing meals requires assistance, housework requires assistance, bathing requires assistance, laundry requires assistance, medication administration requires assistance and no assistance needed    Home Safety:  No safety concerns identified    Hearing Impairment:  No hearing concerns    In the past 6 months, have you been bothered by leaking of urine?  No    In general, how would you rate your overall mental or emotional health?  Good    Additional concerns today:  No      Diabetes Follow-up    How often are you checking your blood sugar? One time daily  What time of day are you checking your blood sugars (select all that apply)?  Before meals  Have you had any blood sugars above 200?  No  Have you had any blood sugars below 70?  No  What symptoms do you notice when your blood sugar is low?  Not applicable  What concerns do you have today about your diabetes? None   Do you have any of these symptoms? (Select all that apply)  Unsure, patient is nonverbal  Have you had a diabetic eye exam in the last 12 months? I believe patient has had an exam this year - will check        Hyperlipidemia Follow-Up    Are you regularly taking any medication or supplement to lower your cholesterol?   Yes- " Simvastatin  Are you having muscle aches or other side effects that you think could be caused by your cholesterol lowering medication?  No    Hypertension Follow-up    Do you check your blood pressure regularly outside of the clinic? No   Are you following a low salt diet? Yes  Are your blood pressures ever more than 140 on the top number (systolic) OR more   than 90 on the bottom number (diastolic), for example 140/90? No    BP Readings from Last 2 Encounters:   10/02/23 110/70   03/30/23 116/62     Hemoglobin A1C (%)   Date Value   03/30/2023 6.5 (H)   09/29/2022 6.4 (H)   11/16/2020 6.4 (H)   02/12/2020 6.9 (H)     LDL Cholesterol Calculated (mg/dL)   Date Value   03/30/2023 39   09/29/2022 38   11/16/2020 47   02/12/2020 21         Chronic Kidney Disease Follow-up  Do you take any over the counter pain medicine?: No      Social History     Tobacco Use     Smoking status: Never     Smokeless tobacco: Never   Substance Use Topics     Alcohol use: Yes     Comment: 1 beer occasionally             10/2/2023     8:47 AM   Alcohol Use   Prescreen: >3 drinks/day or >7 drinks/week? No       Last PSA:   PSA   Date Value Ref Range Status   02/08/2018 1.20 0 - 4 ug/L Final     Comment:     Assay Method:  Chemiluminescence using Siemens Vista analyzer     Prostate Specific Antigen Screen   Date Value Ref Range Status   09/29/2022 1.02 0.00 - 4.00 ug/L Final       Reviewed orders with patient. Reviewed health maintenance and updated orders accordingly - Yes  Patient Active Problem List   Diagnosis     Advanced care planning/counseling discussion     Diabetes mellitus, type 2 (H)     Essential hypertension, benign     Hyperlipidemia     Intellectual disability     Arthritis     Chronic kidney disease, stage 1     Past Surgical History:   Procedure Laterality Date     MOUTH SURGERY  2008     undescended testicle surgery         Social History     Tobacco Use     Smoking status: Never     Smokeless tobacco: Never   Substance Use  Topics     Alcohol use: Yes     Comment: 1 beer occasionally     Family History   Problem Relation Age of Onset     Cancer Mother         brain cancer - cause of death     Diabetes Mother 60     Cancer Father 76        renal cancer - cause of death     Diabetes Sister 50         Current Outpatient Medications   Medication Sig Dispense Refill     ACETAMINOPHEN PO Take 325 mg by mouth as needed for pain (4-6 hours)        blood glucose (NO BRAND SPECIFIED) test strip Use to test blood sugar 2 times daily or as directed. 200 strip 3     blood glucose monitoring (ONE TOUCH ULTRA 2) meter device kit Use to test blood sugar 2 times daily or as directed. 1 kit 0     celecoxib (CELEBREX) 200 MG capsule Take 1 capsule (200 mg) by mouth daily 30 capsule 5     cetirizine (ZYRTEC) 10 MG tablet TAKE ONE (1) TABLET BY MOUTH DAILY 30 tablet 11     chlorhexidine (PERIDEX) 0.12 % solution RINSE WITH 15 MLS ORALLY TWICE DAILY 473 mL 0     famotidine (PEPCID) 40 MG tablet Take 1 tablet (40 mg) by mouth daily 90 tablet 1     glipiZIDE (GLUCOTROL XL) 5 MG 24 hr tablet Take 1 tablet (5 mg) by mouth daily 30 tablet 5     GNP ANTI-ITCH 0.5-0.5 % external lotion APPLY TO THE AFFECTED AREA(S) Topically AS DIRECTED AS NEEDED 222 mL 4     metFORMIN (GLUCOPHAGE) 1000 MG tablet TAKE ONE (1) TABLET BY MOUTH TWICE DAILY 60 tablet 5     mineral oil-hydrophilic petrolatum (AQUAPHOR) external ointment Apply topically as needed 396 g 1     Multiple Vitamins-Iron (GNP ONE DAILY PLUS IRON) TABS TAKE ONE (1) TABLET BY MOUTH DAILY 100 tablet 3     Multiple Vitamins-Minerals (SENTRY) TABS TAKE ONE (1) TABLET BY MOUTH DAILY 100 tablet 3     order for DME Equipment being ordered: Diabetic shoes dx: DM type 2, controlled, history of pressure wound 1 Device 0     order for DME Equipment being ordered: Wheeled walker with seat    Patient has frequent falls, and need to rest often (needs seat) 1 Device 0     ramipril (ALTACE) 5 MG capsule TAKE ONE (1) CAPSULE BY  MOUTH DAILY 30 capsule 5     simvastatin (ZOCOR) 10 MG tablet TAKE ONE (1) TABLET BY MOUTH DAILY 90 tablet 1     triamcinolone (KENALOG) 0.1 % external cream APPLY TO AFFECTED AREA(S) TOPICALLY TWICE DAILY 80 g 11     Allergies   Allergen Reactions     Tape [Adhesive Tape]        Reviewed and updated as needed this visit by clinical staff   Tobacco  Allergies  Meds  Problems  Med Hx  Surg Hx  Fam Hx          Reviewed and updated as needed this visit by Provider   Tobacco  Allergies  Meds  Problems  Med Hx  Surg Hx  Fam Hx           Review of Systems   Constitutional:  Negative for chills and fever.   HENT:  Positive for hearing loss. Negative for congestion, ear pain and sore throat.    Eyes:  Negative for pain and visual disturbance.   Respiratory:  Negative for cough and shortness of breath.    Cardiovascular:  Negative for chest pain, palpitations and peripheral edema.   Gastrointestinal:  Negative for abdominal pain, constipation, diarrhea, heartburn, hematochezia and nausea.   Genitourinary:  Negative for dysuria, frequency, genital sores, hematuria, impotence, penile discharge and urgency.   Musculoskeletal:  Negative for arthralgias, joint swelling and myalgias.   Skin:  Negative for rash.   Neurological:  Negative for dizziness, weakness, headaches and paresthesias.   Psychiatric/Behavioral:  Negative for mood changes. The patient is not nervous/anxious.          OBJECTIVE:   /70 (BP Location: Left arm, Patient Position: Sitting, Cuff Size: Adult Regular)   Pulse 82   Temp 97.2  F (36.2  C) (Tympanic)   Wt 68.6 kg (151 lb 4.8 oz)   SpO2 98%   BMI 25.97 kg/m      Physical Exam  GENERAL: healthy, alert and no distress  EYES: Eyes grossly normal to inspection, PERRL and conjunctivae and sclerae normal  HENT: ear canals and TM's normal, nose and mouth without ulcers or lesions  NECK: no adenopathy  RESP: lungs clear to auscultation - no rales, rhonchi or wheezes  CV: regular rates and  "rhythm, normal S1 S2, no S3 or S4, and no murmur, click or rub  SKIN: no suspicious lesions or rashes  NEURO: Normal strength and tone  PSYCH: affect normal    Diagnostic Test Results:  See orders    ASSESSMENT/PLAN:       ICD-10-CM    1. Routine general medical examination at a health care facility  Z00.00       2. Intellectual disability  F79       3. Type 2 diabetes mellitus without complication, without long-term current use of insulin (H)  E11.9 Albumin Random Urine Quantitative with Creat Ratio     Hemoglobin A1c     TSH with free T4 reflex     Hemoglobin A1c     TSH with free T4 reflex      4. Hyperlipidemia, unspecified hyperlipidemia type  E78.5 Lipid Profile     Comprehensive metabolic panel     Lipid Profile     Comprehensive metabolic panel     CANCELED: ALT      5. Essential hypertension, benign  I10 CBC with platelets     Comprehensive metabolic panel     CBC with platelets     Comprehensive metabolic panel     CANCELED: Basic metabolic panel      6. Chronic kidney disease, stage 1  N18.1 Comprehensive metabolic panel     Comprehensive metabolic panel     CANCELED: Basic metabolic panel      7. Screening for AAA (abdominal aortic aneurysm)  Z13.6 Abdominal Aortic Aneurysm Screening/Tracking      8. Screening for malignant neoplasm of prostate  Z12.5 PSA, screen     PSA, screen             COUNSELING:   Reviewed preventive health counseling, as reflected in patient instructions       Immunizations  Influenza and PCV 20 recommended        BMI:   Estimated body mass index is 25.97 kg/m  as calculated from the following:    Height as of 3/30/23: 1.626 m (5' 4\").    Weight as of this encounter: 68.6 kg (151 lb 4.8 oz).       He reports that he has never smoked. He has never used smokeless tobacco.            Nicole Rojas MD  [facility]  "

## 2023-10-02 NOTE — Clinical Note
Not sure who to send this too, so I apologize.  I think patient had his eye exam with Dr. Livingston, can we try to get that record?

## 2023-10-14 DIAGNOSIS — E11.9 TYPE 2 DIABETES MELLITUS WITHOUT COMPLICATION, WITHOUT LONG-TERM CURRENT USE OF INSULIN (H): ICD-10-CM

## 2023-10-14 DIAGNOSIS — I10 ESSENTIAL HYPERTENSION: ICD-10-CM

## 2023-10-16 DIAGNOSIS — E11.65 TYPE 2 DIABETES MELLITUS WITH HYPERGLYCEMIA, WITHOUT LONG-TERM CURRENT USE OF INSULIN (H): ICD-10-CM

## 2023-10-16 NOTE — TELEPHONE ENCOUNTER
Glipizide      Last Written Prescription Date:  4/17/23  Last Fill Quantity: 30,   # refills: 5  Last Office Visit: 10/2/23  Future Office visit:       Routing refill request to provider for review/approval because:    Metformin  Last Written Prescription Date:  4/17/23  Last Fill Quantity: 60,   # refills: 5  Last Office Visit: 10/2/23  Future Office visit:       Routing refill request to provider for review/approval because:    Ramipril      Last Written Prescription Date:  4/17/23  Last Fill Quantity: 30,   # refills: 5  Last Office Visit: 10/2/23  Future Office visit:       Routing refill request to provider for review/approval because:

## 2023-10-17 RX ORDER — RAMIPRIL 5 MG/1
CAPSULE ORAL
Qty: 30 CAPSULE | Refills: 11 | Status: SHIPPED | OUTPATIENT
Start: 2023-10-17

## 2023-10-17 RX ORDER — GLIPIZIDE 5 MG/1
TABLET, FILM COATED, EXTENDED RELEASE ORAL
Qty: 30 TABLET | Refills: 5 | Status: SHIPPED | OUTPATIENT
Start: 2023-10-17 | End: 2024-04-11

## 2023-10-18 RX ORDER — LANCETS 28 GAUGE
EACH MISCELLANEOUS
Qty: 100 EACH | Refills: 0 | Status: SHIPPED | OUTPATIENT
Start: 2023-10-18 | End: 2024-01-16

## 2023-10-18 NOTE — TELEPHONE ENCOUNTER
Lancets      Last Written Prescription Date:  10/5/22  Last Fill Quantity: 100,   # refills: 3  Last Office Visit: 10/2/23  Future Office visit:       Routing refill request to provider for review/approval because:  Drug not active on patient's medication list

## 2023-10-26 DIAGNOSIS — Z00.00 HEALTHCARE MAINTENANCE: ICD-10-CM

## 2023-10-27 NOTE — TELEPHONE ENCOUNTER
Peridex      Last Written Prescription Date:  9/22/23  Last Fill Quantity: 473mL,   # refills: 0  Last Office Visit: 10/2/23  Future Office visit:       Routing refill request to provider for review/approval because:

## 2023-10-30 RX ORDER — CHLORHEXIDINE GLUCONATE ORAL RINSE 1.2 MG/ML
SOLUTION DENTAL
Qty: 473 ML | Refills: 0 | Status: SHIPPED | OUTPATIENT
Start: 2023-10-30 | End: 2023-11-13

## 2023-11-13 DIAGNOSIS — Z00.00 HEALTHCARE MAINTENANCE: ICD-10-CM

## 2023-11-13 RX ORDER — CHLORHEXIDINE GLUCONATE ORAL RINSE 1.2 MG/ML
SOLUTION DENTAL
Qty: 473 ML | Refills: 0 | Status: SHIPPED | OUTPATIENT
Start: 2023-11-13 | End: 2023-12-08

## 2023-11-13 NOTE — TELEPHONE ENCOUNTER
Peridex      Last Written Prescription Date:  10/30/23  Last Fill Quantity: 473mL,   # refills: 0  Last Office Visit: 10/2/23  Future Office visit:       Routing refill request to provider for review/approval because:

## 2023-12-07 DIAGNOSIS — Z00.00 HEALTHCARE MAINTENANCE: ICD-10-CM

## 2023-12-08 RX ORDER — CHLORHEXIDINE GLUCONATE ORAL RINSE 1.2 MG/ML
SOLUTION DENTAL
Qty: 473 ML | Refills: 1 | Status: SHIPPED | OUTPATIENT
Start: 2023-12-08 | End: 2023-12-22

## 2023-12-08 NOTE — TELEPHONE ENCOUNTER
Peridex      Last Written Prescription Date:  11/13/23  Last Fill Quantity: 473mL,   # refills: 0  Last Office Visit: 10/2/23  Future Office visit:       Routing refill request to provider for review/approval because:

## 2023-12-12 DIAGNOSIS — L50.9 HIVES: ICD-10-CM

## 2023-12-12 NOTE — TELEPHONE ENCOUNTER
Zyrtec      Last Written Prescription Date:  12/16/22  Last Fill Quantity: 30,   # refills: 11  Last Office Visit: 10/2/23  Future Office visit:       Routing refill request to provider for review/approval because:

## 2023-12-13 NOTE — TELEPHONE ENCOUNTER
Antihistamines Protocol Failed      Patient is 3-64 years of age    Apply weight-based dosing for peds patients age 3 - 12 years of age.

## 2023-12-14 RX ORDER — CETIRIZINE HYDROCHLORIDE 10 MG/1
TABLET ORAL
Qty: 30 TABLET | Refills: 11 | Status: SHIPPED | OUTPATIENT
Start: 2023-12-14

## 2023-12-22 DIAGNOSIS — Z00.00 HEALTHCARE MAINTENANCE: ICD-10-CM

## 2023-12-22 RX ORDER — CHLORHEXIDINE GLUCONATE ORAL RINSE 1.2 MG/ML
SOLUTION DENTAL
Qty: 473 ML | Refills: 1 | Status: SHIPPED | OUTPATIENT
Start: 2023-12-22 | End: 2024-02-19

## 2023-12-22 NOTE — TELEPHONE ENCOUNTER
Peridex       Last Written Prescription Date:  12/08/2023  Last Fill Quantity: 473mL,   # refills: 1  Last Office Visit: 10/02/2023

## 2024-01-15 DIAGNOSIS — E11.65 TYPE 2 DIABETES MELLITUS WITH HYPERGLYCEMIA, WITHOUT LONG-TERM CURRENT USE OF INSULIN (H): ICD-10-CM

## 2024-01-15 NOTE — TELEPHONE ENCOUNTER
lancets  Last Written Prescription Date: 10/18/23  Last Fill Quantity: 100 # of Refills: 0  Last Office Visit: 10/2/23

## 2024-01-16 RX ORDER — LANCETS 28 GAUGE
EACH MISCELLANEOUS
Qty: 100 EACH | Refills: 3 | Status: SHIPPED | OUTPATIENT
Start: 2024-01-16

## 2024-01-26 DIAGNOSIS — L23.7 CONTACT DERMATITIS DUE TO POISON IVY: ICD-10-CM

## 2024-01-26 NOTE — TELEPHONE ENCOUNTER
Kenalog Cream  Last Written Prescription Date: 1/25/23  Last Fill Quantity: 80 g # of Refills: 11  Last Office Visit: 10/2/23

## 2024-01-29 RX ORDER — TRIAMCINOLONE ACETONIDE 1 MG/G
CREAM TOPICAL
Qty: 80 G | Refills: 11 | Status: SHIPPED | OUTPATIENT
Start: 2024-01-29

## 2024-02-12 DIAGNOSIS — K21.9 GASTROESOPHAGEAL REFLUX DISEASE, UNSPECIFIED WHETHER ESOPHAGITIS PRESENT: ICD-10-CM

## 2024-02-12 DIAGNOSIS — E78.2 MIXED HYPERLIPIDEMIA: ICD-10-CM

## 2024-02-12 NOTE — TELEPHONE ENCOUNTER
Pepcid       Last Written Prescription Date:  8/15/2023  Last Fill Quantity: 90,   # refills: 1  Last Office Visit: 10/02/2023  Future Office visit:    Next 5 appointments (look out 90 days)      Apr 02, 2024  9:00 AM  (Arrive by 8:45 AM)  SHORT with Nicole Rojas MD  [facility] ([facility] ) [address]  [place] [address]  [phone]

## 2024-02-12 NOTE — TELEPHONE ENCOUNTER
Simvastatin (Zocor) 10 MG tablet    Last Written Prescription Date:  08/15/2023  Last Fill Quantity: 90,   # refills: 1  Last Office Visit: 10/02/2023

## 2024-02-13 RX ORDER — SIMVASTATIN 10 MG
TABLET ORAL
Qty: 90 TABLET | Refills: 1 | Status: SHIPPED | OUTPATIENT
Start: 2024-02-13 | End: 2024-08-06

## 2024-02-13 RX ORDER — FAMOTIDINE 40 MG/1
40 TABLET, FILM COATED ORAL DAILY
Qty: 90 TABLET | Refills: 1 | Status: SHIPPED | OUTPATIENT
Start: 2024-02-13 | End: 2024-08-06

## 2024-02-19 DIAGNOSIS — Z00.00 HEALTHCARE MAINTENANCE: ICD-10-CM

## 2024-02-19 RX ORDER — CHLORHEXIDINE GLUCONATE ORAL RINSE 1.2 MG/ML
SOLUTION DENTAL
Qty: 473 ML | Refills: 1 | Status: SHIPPED | OUTPATIENT
Start: 2024-02-19 | End: 2024-03-28

## 2024-02-19 NOTE — TELEPHONE ENCOUNTER
Routing refill request to provider for review/approval because:    Drug not on the [facility], [facility] or [facility] refill protocol or controlled substance

## 2024-02-19 NOTE — TELEPHONE ENCOUNTER
chlorhexidine (PERIDEX) 0.12 % solution 473 mL 1 12/22/2023     Last Office Visit: 10/02/2023     Future Office visit:    Next 5 appointments (look out 90 days)      Apr 02, 2024  9:00 AM  (Arrive by 8:45 AM)  SHORT with Nicole Rojas MD  [facility] ([facility] ) [address]  [facility] [address]  [phone]             Routing refill request to provider for review/approval because:

## 2024-02-27 ENCOUNTER — TELEPHONE (OUTPATIENT)
Dept: FAMILY MEDICINE | Facility: OTHER | Age: 69
End: 2024-02-27

## 2024-03-12 DIAGNOSIS — M19.90 ARTHRITIS: ICD-10-CM

## 2024-03-12 RX ORDER — CELECOXIB 200 MG/1
200 CAPSULE ORAL DAILY
Qty: 30 CAPSULE | Refills: 5 | Status: SHIPPED | OUTPATIENT
Start: 2024-03-12 | End: 2024-09-09

## 2024-03-27 DIAGNOSIS — Z00.00 HEALTHCARE MAINTENANCE: ICD-10-CM

## 2024-03-28 RX ORDER — CHLORHEXIDINE GLUCONATE ORAL RINSE 1.2 MG/ML
SOLUTION DENTAL
Qty: 473 ML | Refills: 1 | Status: SHIPPED | OUTPATIENT
Start: 2024-03-28 | End: 2024-05-23

## 2024-03-28 NOTE — PROGRESS NOTES
"  Assessment & Plan     1. Type 2 diabetes mellitus with stage 1 chronic kidney disease, without long-term current use of insulin (H)  Labs updated, will make adjustments if needed.  Follow-up in six months, sooner as needed.  Will send clinical note to Iqra for appointment today  - Hemoglobin A1c; Future  - Hemoglobin A1c    2. Hyperlipidemia, unspecified hyperlipidemia type  As above  - ALT; Future  - Lipid Profile; Future  - ALT  - Lipid Profile    3. Essential hypertension, benign  As above  - Basic metabolic panel; Future  - Basic metabolic panel    4. Chronic kidney disease, stage 1  As above  - Basic metabolic panel; Future  - Basic metabolic panel        The longitudinal plan of care for the diagnosis(es)/condition(s) as documented were addressed during this visit. Due to the added complexity in care, I will continue to support Aki in the subsequent management and with ongoing continuity of care.       BMI  Estimated body mass index is 27.31 kg/m  as calculated from the following:    Height as of this encounter: 1.626 m (5' 4\").    Weight as of this encounter: 72.2 kg (159 lb 1.6 oz).     Return in about 6 months (around 10/2/2024) for Diabetic Follow-up, Chronic Disease Management, Medication review.      Subjective   Aki is a 68 year old, presenting for the following health issues:  Hypertension, Lipids, Diabetes, and chronic kidney disease     HPI     Diabetes Follow-up    How often are you checking your blood sugar? One time daily  What time of day are you checking your blood sugars (select all that apply)?  Before meals  Have you had any blood sugars above 200?  Yes once in awhile   Have you had any blood sugars below 70?  No  What symptoms do you notice when your blood sugar is low?  None  What concerns do you have today about your diabetes? None   Do you have any of these symptoms? (Select all that apply)  No numbness or tingling in feet.  No redness, sores or blisters on feet.  No " "complaints of excessive thirst.  No reports of blurry vision.  No significant changes to weight. Staff states does not seem to have issues   Patient has appointment at [facility] today for diabetic shoes    Hyperlipidemia Follow-Up    Are you regularly taking any medication or supplement to lower your cholesterol?   Yes- Simvastatin 10 mg  Are you having muscle aches or other side effects that you think could be caused by your cholesterol lowering medication?  No    Hypertension Follow-up    Do you check your blood pressure regularly outside of the clinic? No   Are you following a low salt diet? Yes  Are your blood pressures ever more than 140 on the top number (systolic) OR more   than 90 on the bottom number (diastolic), for example 140/90? No    BP Readings from Last 2 Encounters:   04/02/24 132/70   10/02/23 110/70     Hemoglobin A1C (%)   Date Value   10/02/2023 6.7 (H)   03/30/2023 6.5 (H)   11/16/2020 6.4 (H)   02/12/2020 6.9 (H)     LDL Cholesterol Calculated (mg/dL)   Date Value   10/02/2023 59   03/30/2023 39   11/16/2020 47   02/12/2020 21         Chronic Kidney Disease Follow-up    Do you take any over the counter pain medicine?: Yes  What over the counter medicine are you taking for your pain?:  Tylenol     How often do you take this medicine?:   about once every 6 months or so           Review of Systems  Constitutional, HEENT, cardiovascular, pulmonary, gi and gu systems are negative, except as otherwise noted.      Objective    /70 (BP Location: Right arm, Patient Position: Chair, Cuff Size: Adult Regular)   Pulse 80   Temp 97.6  F (36.4  C) (Tympanic)   Resp 18   Ht 1.626 m (5' 4\")   Wt 72.2 kg (159 lb 1.6 oz)   SpO2 97%   BMI 27.31 kg/m    Body mass index is 27.31 kg/m .  Physical Exam   GENERAL: alert and no distress  PSYCH: mentation appears normal, affect normal/bright          Signed Electronically by: Nicole Rojas MD    "

## 2024-03-28 NOTE — TELEPHONE ENCOUNTER
chlorhexidine gluconate 0.12 % mouthwash         Last Written Prescription Date:  2/19/24  Last Fill Quantity: 473 mL,   # refills: 1  Last Office Visit: 10/2/23  Future Office visit:    Next 5 appointments (look out 90 days)      Apr 02, 2024  9:00 AM  (Arrive by 8:45 AM)  SHORT with Nicole Rojas MD  [facility] ([facility] ) [address]  [facility] [address]  [phone]             Routing refill request to provider for review/approval because:  Drug not on the [facility], [facility] or  [facility] refill protocol or controlled substance

## 2024-04-02 ENCOUNTER — OFFICE VISIT (OUTPATIENT)
Dept: FAMILY MEDICINE | Facility: OTHER | Age: 69
End: 2024-04-02
Attending: FAMILY MEDICINE
Payer: COMMERCIAL

## 2024-04-02 VITALS
WEIGHT: 159.1 LBS | TEMPERATURE: 97.6 F | DIASTOLIC BLOOD PRESSURE: 70 MMHG | BODY MASS INDEX: 27.16 KG/M2 | HEART RATE: 80 BPM | OXYGEN SATURATION: 97 % | RESPIRATION RATE: 18 BRPM | SYSTOLIC BLOOD PRESSURE: 132 MMHG | HEIGHT: 64 IN

## 2024-04-02 DIAGNOSIS — E78.5 HYPERLIPIDEMIA, UNSPECIFIED HYPERLIPIDEMIA TYPE: ICD-10-CM

## 2024-04-02 DIAGNOSIS — E11.22 TYPE 2 DIABETES MELLITUS WITH STAGE 1 CHRONIC KIDNEY DISEASE, WITHOUT LONG-TERM CURRENT USE OF INSULIN (H): Primary | ICD-10-CM

## 2024-04-02 DIAGNOSIS — N18.1 TYPE 2 DIABETES MELLITUS WITH STAGE 1 CHRONIC KIDNEY DISEASE, WITHOUT LONG-TERM CURRENT USE OF INSULIN (H): Primary | ICD-10-CM

## 2024-04-02 DIAGNOSIS — I10 ESSENTIAL HYPERTENSION, BENIGN: ICD-10-CM

## 2024-04-02 DIAGNOSIS — E11.9 TYPE 2 DIABETES MELLITUS WITHOUT COMPLICATION, WITHOUT LONG-TERM CURRENT USE OF INSULIN (H): ICD-10-CM

## 2024-04-02 DIAGNOSIS — N18.1 CHRONIC KIDNEY DISEASE, STAGE 1: ICD-10-CM

## 2024-04-02 LAB
ALT SERPL W P-5'-P-CCNC: 23 U/L (ref 0–70)
ANION GAP SERPL CALCULATED.3IONS-SCNC: 13 MMOL/L (ref 7–15)
BUN SERPL-MCNC: 25.1 MG/DL (ref 8–23)
CALCIUM SERPL-MCNC: 9.1 MG/DL (ref 8.8–10.2)
CHLORIDE SERPL-SCNC: 100 MMOL/L (ref 98–107)
CHOLEST SERPL-MCNC: 101 MG/DL
CREAT SERPL-MCNC: 0.97 MG/DL (ref 0.67–1.17)
CREAT UR-MCNC: 162.5 MG/DL
DEPRECATED HCO3 PLAS-SCNC: 24 MMOL/L (ref 22–29)
EGFRCR SERPLBLD CKD-EPI 2021: 86 ML/MIN/1.73M2
EST. AVERAGE GLUCOSE BLD GHB EST-MCNC: 163 MG/DL
FASTING STATUS PATIENT QL REPORTED: NO
GLUCOSE SERPL-MCNC: 154 MG/DL (ref 70–99)
HBA1C MFR BLD: 7.3 %
HDLC SERPL-MCNC: 39 MG/DL
LDLC SERPL CALC-MCNC: 33 MG/DL
MICROALBUMIN UR-MCNC: 17.5 MG/L
MICROALBUMIN/CREAT UR: 10.77 MG/G CR (ref 0–17)
NONHDLC SERPL-MCNC: 62 MG/DL
POTASSIUM SERPL-SCNC: 4.4 MMOL/L (ref 3.4–5.3)
SODIUM SERPL-SCNC: 137 MMOL/L (ref 135–145)
TRIGL SERPL-MCNC: 144 MG/DL

## 2024-04-02 PROCEDURE — 80061 LIPID PANEL: CPT | Mod: ZL | Performed by: FAMILY MEDICINE

## 2024-04-02 PROCEDURE — 82570 ASSAY OF URINE CREATININE: CPT | Mod: ZL | Performed by: FAMILY MEDICINE

## 2024-04-02 PROCEDURE — 84460 ALANINE AMINO (ALT) (SGPT): CPT | Mod: ZL | Performed by: FAMILY MEDICINE

## 2024-04-02 PROCEDURE — 83036 HEMOGLOBIN GLYCOSYLATED A1C: CPT | Mod: ZL | Performed by: FAMILY MEDICINE

## 2024-04-02 PROCEDURE — 80048 BASIC METABOLIC PNL TOTAL CA: CPT | Mod: ZL | Performed by: FAMILY MEDICINE

## 2024-04-02 PROCEDURE — 99214 OFFICE O/P EST MOD 30 MIN: CPT | Performed by: FAMILY MEDICINE

## 2024-04-02 PROCEDURE — 36415 COLL VENOUS BLD VENIPUNCTURE: CPT | Mod: ZL | Performed by: FAMILY MEDICINE

## 2024-04-02 PROCEDURE — G0463 HOSPITAL OUTPT CLINIC VISIT: HCPCS

## 2024-04-02 PROCEDURE — G2211 COMPLEX E/M VISIT ADD ON: HCPCS | Performed by: FAMILY MEDICINE

## 2024-04-02 ASSESSMENT — PAIN SCALES - GENERAL: PAINLEVEL: NO PAIN (0)

## 2024-04-05 DIAGNOSIS — N18.1 TYPE 2 DIABETES MELLITUS WITH STAGE 1 CHRONIC KIDNEY DISEASE, WITHOUT LONG-TERM CURRENT USE OF INSULIN (H): Primary | ICD-10-CM

## 2024-04-05 DIAGNOSIS — E11.22 TYPE 2 DIABETES MELLITUS WITH STAGE 1 CHRONIC KIDNEY DISEASE, WITHOUT LONG-TERM CURRENT USE OF INSULIN (H): Primary | ICD-10-CM

## 2024-04-10 DIAGNOSIS — E11.9 TYPE 2 DIABETES MELLITUS WITHOUT COMPLICATION, WITHOUT LONG-TERM CURRENT USE OF INSULIN (H): ICD-10-CM

## 2024-04-10 NOTE — TELEPHONE ENCOUNTER
Metformin      Last Written Prescription Date:  10/17/23  Last Fill Quantity: 60,   # refills: 5  Last Office Visit: 4/2/24  Future Office visit:       Routing refill request to provider for review/approval because:      Glipizide      Last Written Prescription Date:  10/17/23  Last Fill Quantity: 30,   # refills: 5  Last Office Visit: 4/2/24  Future Office visit:       Routing refill request to provider for review/approval because:

## 2024-04-11 RX ORDER — GLIPIZIDE 5 MG/1
TABLET, FILM COATED, EXTENDED RELEASE ORAL
Qty: 30 TABLET | Refills: 5 | Status: SHIPPED | OUTPATIENT
Start: 2024-04-11

## 2024-04-23 DIAGNOSIS — L30.9 DERMATITIS: ICD-10-CM

## 2024-04-23 NOTE — TELEPHONE ENCOUNTER
Dermasarra     Last Written Prescription Date:  Not previously prescribed  Last Fill Quantity: -,   # refills: -  Last Office Visit: 4/2/24  Future Office visit:       Routing refill request to provider for review/approval because:  Medication is reported/historical

## 2024-04-24 NOTE — TELEPHONE ENCOUNTER
Routing refill request to provider for review/approval because:  Drug not active on patient's medication list

## 2024-04-29 ENCOUNTER — TELEPHONE (OUTPATIENT)
Dept: FAMILY MEDICINE | Facility: OTHER | Age: 69
End: 2024-04-29

## 2024-05-21 DIAGNOSIS — E11.9 TYPE 2 DIABETES MELLITUS WITHOUT COMPLICATION, WITHOUT LONG-TERM CURRENT USE OF INSULIN (H): ICD-10-CM

## 2024-05-21 DIAGNOSIS — Z00.00 HEALTHCARE MAINTENANCE: ICD-10-CM

## 2024-05-22 RX ORDER — BLOOD SUGAR DIAGNOSTIC
STRIP MISCELLANEOUS
Qty: 200 STRIP | Refills: 3 | Status: SHIPPED | OUTPATIENT
Start: 2024-05-22

## 2024-05-22 NOTE — TELEPHONE ENCOUNTER
Routing refill request to provider for review/approval because:  Drug not on the [facility], [facility] or [facility] refill protocol or controlled substance

## 2024-05-22 NOTE — TELEPHONE ENCOUNTER
Chlorhexidine (Peridex) 0.12% solution    Last Written Prescription Date:  03/28/2024  Last Fill Quantity: 473mL,   # refills: 1  Last Office Visit: 04/02/2024        One Touch Ultra Test Strips    Last Written Prescription Date:  05/11/2023  Last Fill Quantity: 200,   # refills: 3

## 2024-05-23 RX ORDER — CHLORHEXIDINE GLUCONATE ORAL RINSE 1.2 MG/ML
SOLUTION DENTAL
Qty: 473 ML | Refills: 1 | Status: SHIPPED | OUTPATIENT
Start: 2024-05-23 | End: 2024-07-02

## 2024-06-07 ENCOUNTER — TELEPHONE (OUTPATIENT)
Dept: FAMILY MEDICINE | Facility: OTHER | Age: 69
End: 2024-06-07

## 2024-06-07 NOTE — TELEPHONE ENCOUNTER
12:34 PM    Reason for Call: Phone Call    Description: Patient's sister called in stating that the patient is refusing to go to his appointment later today 6-7-24 with Dr Rojas and is wondering if the paperwork for his orthopedic shoes has arrived from [facility] and if Dr Rojas would be willing to fill it out without the patient being at his appointment. Please call patient's sister back.     Was an appointment offered for this call? No  If yes : Appointment type              Date    Preferred method for responding to this message: Telephone Call  What is your phone number ? [phone]     If we cannot reach you directly, may we leave a detailed response at the number you provided? Yes    Can this message wait until your PCP/provider returns, if available today? Not applicable, PROVIDER IN CLINIC    Margarita Young

## 2024-06-14 NOTE — PROGRESS NOTES
"  Assessment & Plan     1. Type 2 diabetes mellitus with stage 1 chronic kidney disease, without long-term current use of insulin (H)  Order for diabetic shoes has already been submitted, patient also has club foot deformity.  Forms will be completed.    2. Hyperlipidemia, unspecified hyperlipidemia type  Due for recheck at upcoming appointment.    3. Essential hypertension, benign  As above    4. Chronic kidney disease, stage 1  As above    5. Screen for colon cancer  Ordered  - COLOGUARD([employer]); Future    6. Bilateral club feet  As above       The longitudinal plan of care for the diagnosis(es)/condition(s) as documented were addressed during this visit. Due to the added complexity in care, I will continue to support Aki in the subsequent management and with ongoing continuity of care.     BMI  Estimated body mass index is 26.16 kg/m  as calculated from the following:    Height as of this encounter: 1.626 m (5' 4\").    Weight as of this encounter: 69.1 kg (152 lb 6.4 oz).     Return in about 4 months (around 10/17/2024) for Diabetic Follow-up, Chronic Disease Management, Medication review.      Subjective   Aki is a 68 year old, presenting for the following health issues:  Diabetes, Lipids, and Hypertension    HPI     Diabetes Follow-up    How often are you checking your blood sugar? One time daily  What time of day are you checking your blood sugars (select all that apply)?  Before meals  Have you had any blood sugars above 200?  Yes a few times  Have you had any blood sugars below 70?  No  What symptoms do you notice when your blood sugar is low?  None  What concerns do you have today about your diabetes? None   Do you have any of these symptoms? (Select all that apply)  No numbness or tingling in feet.  No redness, sores or blisters on feet.  No complaints of excessive thirst.  No reports of blurry vision.  No significant changes to weight.  Pt needs to renew his shoes.      BP Readings from " "Last 2 Encounters:   06/17/24 126/72   04/02/24 132/70     Hemoglobin A1C (%)   Date Value   04/02/2024 7.3 (H)   10/02/2023 6.7 (H)   11/16/2020 6.4 (H)   02/12/2020 6.9 (H)     LDL Cholesterol Calculated (mg/dL)   Date Value   04/02/2024 33   10/02/2023 59   11/16/2020 47   02/12/2020 21             Hyperlipidemia Follow-Up    Are you regularly taking any medication or supplement to lower your cholesterol?   Yes- Zocor  Are you having muscle aches or other side effects that you think could be caused by your cholesterol lowering medication?  No    Hypertension Follow-up    Do you check your blood pressure regularly outside of the clinic? No   Are you following a low salt diet? No  Are your blood pressures ever more than 140 on the top number (systolic) OR more   than 90 on the bottom number (diastolic), for example 140/90? No        Review of Systems  Constitutional, HEENT, cardiovascular, pulmonary, gi and gu systems are negative, except as otherwise noted.      Objective    /72 (BP Location: Right arm, Patient Position: Sitting, Cuff Size: Adult Regular)   Pulse 95   Temp 97  F (36.1  C) (Tympanic)   Resp 18   Ht 1.626 m (5' 4\")   Wt 69.1 kg (152 lb 6.4 oz)   SpO2 98%   BMI 26.16 kg/m    Body mass index is 26.16 kg/m .  Physical Exam   GENERAL: alert and no distress  PSYCH: mentation appears normal, affect normal/bright  Diabetic foot exam: patient does not allow exam today          Signed Electronically by: Nicole Rojas MD    "

## 2024-06-17 ENCOUNTER — ORDERS ONLY (AUTO-RELEASED) (OUTPATIENT)
Dept: FAMILY MEDICINE | Facility: OTHER | Age: 69
End: 2024-06-17

## 2024-06-17 ENCOUNTER — OFFICE VISIT (OUTPATIENT)
Dept: FAMILY MEDICINE | Facility: OTHER | Age: 69
End: 2024-06-17
Attending: FAMILY MEDICINE
Payer: COMMERCIAL

## 2024-06-17 VITALS
TEMPERATURE: 97 F | WEIGHT: 152.4 LBS | OXYGEN SATURATION: 98 % | RESPIRATION RATE: 18 BRPM | BODY MASS INDEX: 26.02 KG/M2 | HEIGHT: 64 IN | SYSTOLIC BLOOD PRESSURE: 126 MMHG | DIASTOLIC BLOOD PRESSURE: 72 MMHG | HEART RATE: 95 BPM

## 2024-06-17 DIAGNOSIS — Z12.11 SCREEN FOR COLON CANCER: ICD-10-CM

## 2024-06-17 DIAGNOSIS — E11.22 TYPE 2 DIABETES MELLITUS WITH STAGE 1 CHRONIC KIDNEY DISEASE, WITHOUT LONG-TERM CURRENT USE OF INSULIN (H): Primary | ICD-10-CM

## 2024-06-17 DIAGNOSIS — Q66.89 BILATERAL CLUB FEET: ICD-10-CM

## 2024-06-17 DIAGNOSIS — I10 ESSENTIAL HYPERTENSION, BENIGN: ICD-10-CM

## 2024-06-17 DIAGNOSIS — E78.5 HYPERLIPIDEMIA, UNSPECIFIED HYPERLIPIDEMIA TYPE: ICD-10-CM

## 2024-06-17 DIAGNOSIS — N18.1 CHRONIC KIDNEY DISEASE, STAGE 1: ICD-10-CM

## 2024-06-17 DIAGNOSIS — N18.1 TYPE 2 DIABETES MELLITUS WITH STAGE 1 CHRONIC KIDNEY DISEASE, WITHOUT LONG-TERM CURRENT USE OF INSULIN (H): Primary | ICD-10-CM

## 2024-06-17 PROCEDURE — G2211 COMPLEX E/M VISIT ADD ON: HCPCS | Performed by: FAMILY MEDICINE

## 2024-06-17 PROCEDURE — G0463 HOSPITAL OUTPT CLINIC VISIT: HCPCS

## 2024-06-17 PROCEDURE — 99213 OFFICE O/P EST LOW 20 MIN: CPT | Performed by: FAMILY MEDICINE

## 2024-06-17 RX ORDER — RESPIRATORY SYNCYTIAL VIRUS VACCINE 120MCG/0.5
0.5 KIT INTRAMUSCULAR ONCE
Qty: 1 EACH | Refills: 0 | Status: CANCELLED | OUTPATIENT
Start: 2024-06-17 | End: 2024-06-17

## 2024-06-17 ASSESSMENT — PAIN SCALES - GENERAL: PAINLEVEL: NO PAIN (0)

## 2024-07-01 ENCOUNTER — MEDICAL CORRESPONDENCE (OUTPATIENT)
Dept: HEALTH INFORMATION MANAGEMENT | Facility: HOSPITAL | Age: 69
End: 2024-07-01

## 2024-07-02 DIAGNOSIS — Z00.00 HEALTHCARE MAINTENANCE: ICD-10-CM

## 2024-07-02 RX ORDER — CHLORHEXIDINE GLUCONATE ORAL RINSE 1.2 MG/ML
SOLUTION DENTAL
Qty: 473 ML | Refills: 1 | Status: SHIPPED | OUTPATIENT
Start: 2024-07-02 | End: 2024-08-19

## 2024-07-02 NOTE — TELEPHONE ENCOUNTER
Peridex      Last Written Prescription Date:  7.2.24  Last Fill Quantity: #473mL,   # refills: 0  Last Office Visit: 6.17.24  Future Office visit:    Next 5 appointments (look out 90 days)      Jul 29, 2024 9:00 AM  (Arrive by 8:45 AM)  Provider Visit with Nicole Rojas MD  [facility] ([facility] ) [address]  [facility] [address]  [phone]             Routing refill request to provider for review/approval because:  Drug not on the [facility], [facility] or [facility] refill protocol or controlled substance

## 2024-07-19 ENCOUNTER — MEDICAL CORRESPONDENCE (OUTPATIENT)
Dept: HEALTH INFORMATION MANAGEMENT | Facility: CLINIC | Age: 69
End: 2024-07-19

## 2024-08-05 DIAGNOSIS — E78.2 MIXED HYPERLIPIDEMIA: ICD-10-CM

## 2024-08-05 DIAGNOSIS — K21.9 GASTROESOPHAGEAL REFLUX DISEASE, UNSPECIFIED WHETHER ESOPHAGITIS PRESENT: ICD-10-CM

## 2024-08-06 RX ORDER — SIMVASTATIN 10 MG
TABLET ORAL
Qty: 90 TABLET | Refills: 1 | Status: SHIPPED | OUTPATIENT
Start: 2024-08-06

## 2024-08-06 RX ORDER — FAMOTIDINE 40 MG/1
40 TABLET, FILM COATED ORAL DAILY
Qty: 90 TABLET | Refills: 1 | Status: SHIPPED | OUTPATIENT
Start: 2024-08-06

## 2024-08-19 DIAGNOSIS — Z00.00 HEALTHCARE MAINTENANCE: ICD-10-CM

## 2024-08-19 RX ORDER — CHLORHEXIDINE GLUCONATE ORAL RINSE 1.2 MG/ML
SOLUTION DENTAL
Qty: 473 ML | Refills: 1 | Status: SHIPPED | OUTPATIENT
Start: 2024-08-19 | End: 2024-09-19

## 2024-08-19 NOTE — TELEPHONE ENCOUNTER
chlorhexidine (PERIDEX) 0.12 % solution   Last Written Prescription Date:  7/02/2024  Last Fill Quantity: 47mL,   # refills: 1  Last Office Visit: 6/17/2024  Future Office visit:    Next 5 appointments (look out 90 days)      Oct 03, 2024 9:30 AM  (Arrive by 9:15 AM)  Adult Preventative Visit with Nicole Rojas MD  [facility] ([facility] ) [address]  [facility] [address]  [phone]

## 2024-08-19 NOTE — TELEPHONE ENCOUNTER
Routing refill request to provider for review/approval because:  Drug not on the [facility], [facility] or [facility] refill protocol or controlled substance

## 2024-09-09 DIAGNOSIS — M19.90 ARTHRITIS: ICD-10-CM

## 2024-09-09 DIAGNOSIS — E11.22 TYPE 2 DIABETES MELLITUS WITH STAGE 1 CHRONIC KIDNEY DISEASE, WITHOUT LONG-TERM CURRENT USE OF INSULIN (H): ICD-10-CM

## 2024-09-09 DIAGNOSIS — N18.1 TYPE 2 DIABETES MELLITUS WITH STAGE 1 CHRONIC KIDNEY DISEASE, WITHOUT LONG-TERM CURRENT USE OF INSULIN (H): ICD-10-CM

## 2024-09-09 RX ORDER — EMPAGLIFLOZIN 10 MG/1
10 TABLET, FILM COATED ORAL DAILY
Qty: 30 TABLET | Refills: 3 | Status: SHIPPED | OUTPATIENT
Start: 2024-09-09

## 2024-09-09 RX ORDER — CELECOXIB 200 MG/1
200 CAPSULE ORAL DAILY
Qty: 30 CAPSULE | Refills: 5 | Status: SHIPPED | OUTPATIENT
Start: 2024-09-09

## 2024-09-09 NOTE — TELEPHONE ENCOUNTER
celecoxib 200 mg capsule         Last Written Prescription Date:  3/12/24  Last Fill Quantity: 30,   # refills: 5  Last Office Visit: 6/17/24  Future Office visit:    Next 5 appointments (look out 90 days)      Oct 03, 2024 9:30 AM  (Arrive by 9:15 AM)  Adult Preventative Visit with Nicole Rojas MD  [facility] ([facility] ) [address]  [place] [address]  [phone]             Routing refill request to provider for review/approval because:    NSAID Medications [id number]/2024 08:44 AM   Protocol Details Patient is age 6-64 years    Always Fail Criteria - Chart Review Required

## 2024-09-18 ENCOUNTER — TRANSFERRED RECORDS (OUTPATIENT)
Dept: HEALTH INFORMATION MANAGEMENT | Facility: CLINIC | Age: 69
End: 2024-09-18

## 2024-09-18 LAB — RETINOPATHY: NORMAL

## 2024-09-19 DIAGNOSIS — Z00.00 HEALTHCARE MAINTENANCE: ICD-10-CM

## 2024-09-19 RX ORDER — CHLORHEXIDINE GLUCONATE ORAL RINSE 1.2 MG/ML
SOLUTION DENTAL
Qty: 473 ML | Refills: 1 | Status: SHIPPED | OUTPATIENT
Start: 2024-09-19

## 2024-09-19 RX ORDER — GLUCOSA SU 2KCL/CHONDROITIN SU 500-400 MG
CAPSULE ORAL
Qty: 100 TABLET | Refills: 3 | Status: SHIPPED | OUTPATIENT
Start: 2024-09-19

## 2024-09-19 NOTE — TELEPHONE ENCOUNTER
Peridex      Last Written Prescription Date:  8/19/24  Last Fill Quantity: 473mL,   # refills: 1  Last Office Visit: 6/17/24  Future Office visit:    Next 5 appointments (look out 90 days)      Oct 03, 2024 9:30 AM  (Arrive by 9:15 AM)  Adult Preventative Visit with Nicole Rojas MD  [facility] ([facility] ) [address]  [place] [address]  [phone]             Routing refill request to provider for review/approval because:      Sentry      Last Written Prescription Date:  8/4/23  Last Fill Quantity: 100,   # refills: 3  Last Office Visit: 6/17/24  Future Office visit:    Next 5 appointments (look out 90 days)      Oct 03, 2024 9:30 AM  (Arrive by 9:15 AM)  Adult Preventative Visit with Nicole Rojas MD  [facility] ([facility] ) [address]  [place] [address]  [phone]             Routing refill request to provider for review/approval because:

## 2024-10-03 ENCOUNTER — OFFICE VISIT (OUTPATIENT)
Dept: FAMILY MEDICINE | Facility: OTHER | Age: 69
End: 2024-10-03
Attending: FAMILY MEDICINE
Payer: COMMERCIAL

## 2024-10-03 VITALS
BODY MASS INDEX: 24.92 KG/M2 | RESPIRATION RATE: 16 BRPM | DIASTOLIC BLOOD PRESSURE: 70 MMHG | SYSTOLIC BLOOD PRESSURE: 124 MMHG | HEIGHT: 64 IN | HEART RATE: 88 BPM | TEMPERATURE: 96.4 F | WEIGHT: 146 LBS | OXYGEN SATURATION: 98 %

## 2024-10-03 DIAGNOSIS — N18.1 CHRONIC KIDNEY DISEASE, STAGE 1: ICD-10-CM

## 2024-10-03 DIAGNOSIS — E78.5 HYPERLIPIDEMIA, UNSPECIFIED HYPERLIPIDEMIA TYPE: ICD-10-CM

## 2024-10-03 DIAGNOSIS — E11.9 TYPE 2 DIABETES MELLITUS WITHOUT COMPLICATION, WITHOUT LONG-TERM CURRENT USE OF INSULIN (H): ICD-10-CM

## 2024-10-03 DIAGNOSIS — F79 INTELLECTUAL DISABILITY: ICD-10-CM

## 2024-10-03 DIAGNOSIS — Z00.00 ENCOUNTER FOR MEDICARE ANNUAL WELLNESS EXAM: Primary | ICD-10-CM

## 2024-10-03 DIAGNOSIS — Z12.5 SCREENING FOR MALIGNANT NEOPLASM OF PROSTATE: ICD-10-CM

## 2024-10-03 DIAGNOSIS — R46.89 COMPULSIVE BEHAVIORS: ICD-10-CM

## 2024-10-03 DIAGNOSIS — R45.1 AGITATION: ICD-10-CM

## 2024-10-03 DIAGNOSIS — I10 ESSENTIAL HYPERTENSION, BENIGN: ICD-10-CM

## 2024-10-03 DIAGNOSIS — Z23 NEED FOR VACCINATION: ICD-10-CM

## 2024-10-03 LAB
ALBUMIN SERPL BCG-MCNC: 4.2 G/DL (ref 3.5–5.2)
ALP SERPL-CCNC: 81 U/L (ref 40–150)
ALT SERPL W P-5'-P-CCNC: 20 U/L (ref 0–70)
ANION GAP SERPL CALCULATED.3IONS-SCNC: 15 MMOL/L (ref 7–15)
AST SERPL W P-5'-P-CCNC: 20 U/L (ref 0–45)
BILIRUB SERPL-MCNC: 0.3 MG/DL
BUN SERPL-MCNC: 21.5 MG/DL (ref 8–23)
CALCIUM SERPL-MCNC: 9 MG/DL (ref 8.8–10.4)
CHLORIDE SERPL-SCNC: 98 MMOL/L (ref 98–107)
CHOLEST SERPL-MCNC: 92 MG/DL
CREAT SERPL-MCNC: 0.91 MG/DL (ref 0.67–1.17)
EGFRCR SERPLBLD CKD-EPI 2021: >90 ML/MIN/1.73M2
ERYTHROCYTE [DISTWIDTH] IN BLOOD BY AUTOMATED COUNT: 12.9 % (ref 10–15)
EST. AVERAGE GLUCOSE BLD GHB EST-MCNC: 148 MG/DL
FASTING STATUS PATIENT QL REPORTED: YES
FASTING STATUS PATIENT QL REPORTED: YES
GLUCOSE SERPL-MCNC: 125 MG/DL (ref 70–99)
HBA1C MFR BLD: 6.8 %
HCO3 SERPL-SCNC: 24 MMOL/L (ref 22–29)
HCT VFR BLD AUTO: 41.1 % (ref 40–53)
HDLC SERPL-MCNC: 43 MG/DL
HGB BLD-MCNC: 14 G/DL (ref 13.3–17.7)
LDLC SERPL CALC-MCNC: 34 MG/DL
MCH RBC QN AUTO: 29.7 PG (ref 26.5–33)
MCHC RBC AUTO-ENTMCNC: 34.1 G/DL (ref 31.5–36.5)
MCV RBC AUTO: 87 FL (ref 78–100)
NONHDLC SERPL-MCNC: 49 MG/DL
PLATELET # BLD AUTO: 258 10E3/UL (ref 150–450)
POTASSIUM SERPL-SCNC: 4.4 MMOL/L (ref 3.4–5.3)
PROT SERPL-MCNC: 7.3 G/DL (ref 6.4–8.3)
PSA SERPL DL<=0.01 NG/ML-MCNC: 1.48 NG/ML (ref 0–4.5)
RBC # BLD AUTO: 4.72 10E6/UL (ref 4.4–5.9)
SODIUM SERPL-SCNC: 137 MMOL/L (ref 135–145)
TRIGL SERPL-MCNC: 73 MG/DL
WBC # BLD AUTO: 4.6 10E3/UL (ref 4–11)

## 2024-10-03 PROCEDURE — G0009 ADMIN PNEUMOCOCCAL VACCINE: HCPCS

## 2024-10-03 PROCEDURE — G0008 ADMIN INFLUENZA VIRUS VAC: HCPCS

## 2024-10-03 PROCEDURE — 80053 COMPREHEN METABOLIC PANEL: CPT | Mod: ZL | Performed by: FAMILY MEDICINE

## 2024-10-03 PROCEDURE — G0103 PSA SCREENING: HCPCS | Mod: ZL | Performed by: FAMILY MEDICINE

## 2024-10-03 PROCEDURE — 85027 COMPLETE CBC AUTOMATED: CPT | Mod: ZL | Performed by: FAMILY MEDICINE

## 2024-10-03 PROCEDURE — 90677 PCV20 VACCINE IM: CPT

## 2024-10-03 PROCEDURE — 99214 OFFICE O/P EST MOD 30 MIN: CPT | Mod: 25 | Performed by: FAMILY MEDICINE

## 2024-10-03 PROCEDURE — G0463 HOSPITAL OUTPT CLINIC VISIT: HCPCS | Mod: 25

## 2024-10-03 PROCEDURE — G0439 PPPS, SUBSEQ VISIT: HCPCS | Performed by: FAMILY MEDICINE

## 2024-10-03 PROCEDURE — 83036 HEMOGLOBIN GLYCOSYLATED A1C: CPT | Mod: ZL | Performed by: FAMILY MEDICINE

## 2024-10-03 PROCEDURE — 36415 COLL VENOUS BLD VENIPUNCTURE: CPT | Mod: ZL | Performed by: FAMILY MEDICINE

## 2024-10-03 PROCEDURE — 80061 LIPID PANEL: CPT | Mod: ZL | Performed by: FAMILY MEDICINE

## 2024-10-03 SDOH — HEALTH STABILITY: PHYSICAL HEALTH: ON AVERAGE, HOW MANY DAYS PER WEEK DO YOU ENGAGE IN MODERATE TO STRENUOUS EXERCISE (LIKE A BRISK WALK)?: 0 DAYS

## 2024-10-03 ASSESSMENT — PAIN SCALES - GENERAL: PAINLEVEL: NO PAIN (0)

## 2024-10-03 ASSESSMENT — SOCIAL DETERMINANTS OF HEALTH (SDOH): HOW OFTEN DO YOU GET TOGETHER WITH FRIENDS OR RELATIVES?: NEVER

## 2024-10-03 NOTE — LETTER
October 3, 2024      Aki DUVALL Ramon  [address] [facility] [address]        Dear ,    We are writing to inform you of your test results.    Normal/stable labs.  HgbA1c improved.     Resulted Orders   Hemoglobin A1c   Result Value Ref Range    Estimated Average Glucose 148 (H) <117 mg/dL    Hemoglobin A1C 6.8 (H) <5.7 %      Comment:      Normal <5.7%   Prediabetes 5.7-6.4%    Diabetes 6.5% or higher     Note: Adopted from ADA consensus guidelines.   Lipid Profile   Result Value Ref Range    Cholesterol 92 <200 mg/dL    Triglycerides 73 <150 mg/dL    Direct Measure HDL 43 >=40 mg/dL    LDL Cholesterol Calculated 34 <100 mg/dL    Non HDL Cholesterol 49 <130 mg/dL    Patient Fasting > 8hrs? Yes     Narrative    Cholesterol  Desirable: < 200 mg/dL  Borderline High: 200 - 239 mg/dL  High: >= 240 mg/dL    Triglycerides  Normal: < 150 mg/dL  Borderline High: 150 - 199 mg/dL  High: 200-499 mg/dL  Very High: >= 500 mg/dL    Direct Measure HDL  Female: >= 50 mg/dL   Male: >= 40 mg/dL    LDL Cholesterol  Desirable: < 100 mg/dL  Above Desirable: 100 - 129 mg/dL   Borderline High: 130 - 159 mg/dL   High:  160 - 189 mg/dL   Very High: >= 190 mg/dL    Non HDL Cholesterol  Desirable: < 130 mg/dL  Above Desirable: 130 - 159 mg/dL  Borderline High: 160 - 189 mg/dL  High: 190 - 219 mg/dL  Very High: >= 220 mg/dL   PSA, screen   Result Value Ref Range    Prostate Specific Antigen Screen 1.48 0.00 - 4.50 ng/mL    Narrative    This result is obtained using the Roche Elecsys total PSA method on the hood e601 immunoassay analyzer, which is an ultrasensitive method. Results obtained with different assay methods or kits cannot be used interchangeably.  This test is intended for initial prostate cancer screening. PSA values exceeding the age-specific limits are suspicious for prostate disease, but additional testing, such as prostate biopsy, is needed to diagnose prostate pathology. The American Cancer Society recommends annual  examination with digital rectal examination and serum PSA beginning at age 50 and for men with a life expectancy of at least 10 years after detection of prostate cancer. For men in high-risk groups, such as  Americans or men with a first-degree relative diagnosed at a younger age, testing should begin at a younger age. It is generally recommended that information be provided to patients about the benefits and limitations of testing and treatment so they can make informed decisions.   CBC with platelets   Result Value Ref Range    WBC Count 4.6 4.0 - 11.0 10e3/uL    RBC Count 4.72 4.40 - 5.90 10e6/uL    Hemoglobin 14.0 13.3 - 17.7 g/dL    Hematocrit 41.1 40.0 - 53.0 %    MCV 87 78 - 100 fL    MCH 29.7 26.5 - 33.0 pg    MCHC 34.1 31.5 - 36.5 g/dL    RDW 12.9 10.0 - 15.0 %    Platelet Count 258 150 - 450 10e3/uL   Comprehensive metabolic panel   Result Value Ref Range    Sodium 137 135 - 145 mmol/L    Potassium 4.4 3.4 - 5.3 mmol/L    Carbon Dioxide (CO2) 24 22 - 29 mmol/L    Anion Gap 15 7 - 15 mmol/L    Urea Nitrogen 21.5 8.0 - 23.0 mg/dL    Creatinine 0.91 0.67 - 1.17 mg/dL    GFR Estimate >90 >60 mL/min/1.73m2      Comment:      eGFR calculated using 2021 CKD-EPI equation.    Calcium 9.0 8.8 - 10.4 mg/dL      Comment:      Reference intervals for this test were updated on 7/16/2024 to reflect our healthy population more accurately. There may be differences in the flagging of prior results with similar values performed with this method. Those prior results can be interpreted in the context of the updated reference intervals.    Chloride 98 98 - 107 mmol/L    Glucose 125 (H) 70 - 99 mg/dL    Alkaline Phosphatase 81 40 - 150 U/L    AST 20 0 - 45 U/L    ALT 20 0 - 70 U/L    Protein Total 7.3 6.4 - 8.3 g/dL    Albumin 4.2 3.5 - 5.2 g/dL    Bilirubin Total 0.3 <=1.2 mg/dL    Patient Fasting > 8hrs? Yes        If you have any questions or concerns, please call the clinic at the number listed above.        Sincerely,      Nicole Rojas MD

## 2024-10-03 NOTE — PROGRESS NOTES
"Preventive Care Visit  [facility]  Nicole Rojas MD, Family Medicine  Oct 3, 2024      Assessment & Plan       ICD-10-CM    1. Encounter for Medicare annual wellness exam  Z00.00       2. Intellectual disability  F79 Adult Mental Health  Referral      3. Type 2 diabetes mellitus without complication, without long-term current use of insulin (H)  E11.9 Hemoglobin A1c     Hemoglobin A1c      4. Hyperlipidemia, unspecified hyperlipidemia type  E78.5 Lipid Profile     Comprehensive metabolic panel     Lipid Profile     Comprehensive metabolic panel     CANCELED: ALT      5. Essential hypertension, benign  I10 CBC with platelets     Comprehensive metabolic panel     CBC with platelets     Comprehensive metabolic panel     CANCELED: Basic metabolic panel      6. Chronic kidney disease, stage 1  N18.1 Comprehensive metabolic panel     Comprehensive metabolic panel     CANCELED: Basic metabolic panel      7. Need for vaccination  Z23 Pneumococcal 20 Valent Conjugate (PCV20)     INFLUENZA HIGH DOSE, TRIVALENT, PF (FLUZONE)      8. Agitation  R45.1 Adult Mental Health  Referral      9. Compulsive behaviors  R46.89 Adult Mental Health  Referral      10. Screening for malignant neoplasm of prostate  Z12.5 PSA, screen     PSA, screen         Chronic conditions are overall stable.  Labs are updated, will adjust medication if needed.   Screening labs completed  Vaccines updated  Mental Health referral ordered, see below    Patient has been advised of split billing requirements and indicates understanding: Yes        BMI  Estimated body mass index is 25.06 kg/m  as calculated from the following:    Height as of this encounter: 1.626 m (5' 4\").    Weight as of this encounter: 66.2 kg (146 lb).       Counseling  Appropriate preventive services were addressed with this patient via screening, questionnaire, or discussion as appropriate for fall prevention, nutrition, physical activity, Tobacco-use " cessation, social engagement, weight loss and cognition.  Checklist reviewing preventive services available has been given to the patient.  Reviewed patient's diet, addressing concerns and/or questions.   Patient is at risk for social isolation and has been provided with information about the benefit of social connection.   He is at risk for psychosocial distress and has been provided with information to reduce risk.   Discussed possible causes of fatigue. Updated plan of care.  Patient reported difficulty with activities of daily living were addressed today.The patient was provided with written information regarding signs of hearing loss.       No follow-ups on file.      Mahogany Prabhakar is a 68 year old, presenting for the following:  Physical          Health Care Directive  Patient does not have a Health Care Directive or Living Will: Discussed advance care planning with patient; however, patient declined at this time.    HPI    Diabetes Follow-up    How often are you checking your blood sugar? One time daily  What time of day are you checking your blood sugars (select all that apply)?  Before meals  Have you had any blood sugars above 200?  No  Have you had any blood sugars below 70?  No  What symptoms do you notice when your blood sugar is low?  Not applicable  What concerns do you have today about your diabetes? None   Do you have any of these symptoms? (Select all that apply)  No numbness or tingling in feet.  No redness, sores or blisters on feet.  No complaints of excessive thirst.  No reports of blurry vision.  No significant changes to weight.      BP Readings from Last 2 Encounters:   10/03/24 124/70   06/17/24 126/72     Hemoglobin A1C (%)   Date Value   10/03/2024 6.8 (H)   04/02/2024 7.3 (H)   11/16/2020 6.4 (H)   02/12/2020 6.9 (H)     LDL Cholesterol Calculated (mg/dL)   Date Value   10/03/2024 34   04/02/2024 33   11/16/2020 47   02/12/2020 21             Hyperlipidemia Follow-Up    Are you  regularly taking any medication or supplement to lower your cholesterol?   Yes- simvastatin  Are you having muscle aches or other side effects that you think could be caused by your cholesterol lowering medication?  No    Hypertension Follow-up    Do you check your blood pressure regularly outside of the clinic? Yes   Are you following a low salt diet? Yes  Are your blood pressures ever more than 140 on the top number (systolic) OR more   than 90 on the bottom number (diastolic), for example 140/90? No    Chronic Kidney Disease Follow-up    Do you take any over the counter pain medicine?: No    Staff is still struggling with behaviors.  Patient has started to display some aggressive behaviors towards staff and other house residence.  He is becoming more noncompliant with daily hygiene routines (showering, etc).  Staff is starting to be concerned about patient's safety and the comfort and safety of the other residents in the house.  They wonder about possible medication to help with noncompliant and aggressive behaviors.  Thy also wonder about possible dementia, but they understand that assessment would be difficult as patient is nonverbal.        10/3/2024   General Health   How would you rate your overall physical health? (!) FAIR   Feel stress (tense, anxious, or unable to sleep) Only a little      (!) STRESS CONCERN      10/3/2024   Nutrition   Diet: Low fat/cholesterol            10/3/2024   Exercise   Days per week of moderate/strenous exercise 0 days      (!) EXERCISE CONCERN      10/3/2024   Social Factors   Frequency of gathering with friends or relatives Never   Worry food won't last until get money to buy more No   Food not last or not have enough money for food? No   Do you have housing? (Housing is defined as stable permanent housing and does not include staying ouside in a car, in a tent, in an abandoned building, in an overnight shelter, or couch-surfing.) Yes   Are you worried about losing your  housing? No   Lack of transportation? No   Unable to get utilities (heat,electricity)? No      (!) SOCIAL CONNECTIONS CONCERN      10/3/2024   Fall Risk   Fallen 2 or more times in the past year? Yes    Yes   Trouble with walking or balance? Yes    Yes       Multiple values from one day are sorted in reverse-chronological order           10/3/2024   Activities of Daily Living- Home Safety   Needs help with the following daily activites Telephone use    Transportation    Shopping    Preparing meals    Housework    Bathing    Laundry    Medication administration    Money management   Safety concerns in the home None of the above       Multiple values from one day are sorted in reverse-chronological order         10/3/2024   Dental   Dentist two times every year? Yes            10/3/2024   Hearing Screening   Hearing concerns? (!) I FEEL THAT PEOPLE ARE MUMBLING OR NOT SPEAKING CLEARLY.            10/3/2024   Driving Risk Screening   Patient/family members have concerns about driving No            10/3/2024   General Alertness/Fatigue Screening   Have you been more tired than usual lately? (!) YES            10/3/2024   Urinary Incontinence Screening   Bothered by leaking urine in past 6 months No            10/3/2024   TB Screening   Were you born outside of the [place]? No                  10/3/2024   Substance Use   Alcohol more than 3/day or more than 7/wk Not Applicable   Do you have a current opioid prescription? No   How severe/bad is pain from 1 to 10? 3/10   Do you use any other substances recreationally? No        Social History     Tobacco Use    Smoking status: Never    Smokeless tobacco: Never   Vaping Use    Vaping status: Never Used   Substance Use Topics    Alcohol use: Yes     Comment: 1 beer occasionally    Drug use: No           10/3/2024   AAA Screening   Family history of Abdominal Aortic Aneurysm (AAA)? Unsure      Last PSA:   PSA   Date Value Ref Range Status   02/08/2018 1.20 0 - 4 ug/L Final      Comment:     Assay Method:  Chemiluminescence using Siemens Vista analyzer     Prostate Specific Antigen Screen   Date Value Ref Range Status   10/03/2024 1.48 0.00 - 4.50 ng/mL Final   2022 1.02 0.00 - 4.00 ug/L Final     ASCVD Risk   The ASCVD Risk score (Abdiaziz GONZALEZ, et al., 2019) failed to calculate for the following reasons:    The valid total cholesterol range is 130 to 320 mg/dL    Fracture Risk Assessment Tool  Link to Frax Calculator  Use the information below to complete the Frax calculator  : 1955  Sex: male  Weight (kg): 66.2 kg (actual weight)  Height (cm): 162.6 cm  Previous Fragility Fracture:  No  History of parent with fractured hip:  No  Current Smoking:  No  Patient has been on glucocorticoids for more than 3 months (5mg/day or more): No  Rheumatoid Arthritis on Problem List:  No  Secondary Osteoporosis on Problem List:  No  Consumes 3 or more units of alcohol per day: No  Femoral Neck BMD (g/cm2)            Reviewed and updated as needed this visit by Provider   Tobacco  Allergies  Meds  Problems  Med Hx  Surg Hx  Fam Hx            Patient Active Problem List   Diagnosis    Advanced care planning/counseling discussion    Diabetes mellitus, type 2 (H)    Essential hypertension, benign    Hyperlipidemia    Intellectual disability    Arthritis    Chronic kidney disease, stage 1     Past Surgical History:   Procedure Laterality Date    MOUTH SURGERY      undescended testicle surgery         Social History     Tobacco Use    Smoking status: Never    Smokeless tobacco: Never   Substance Use Topics    Alcohol use: Yes     Comment: 1 beer occasionally     Family History   Problem Relation Age of Onset    Cancer Mother         brain cancer - cause of death    Diabetes Mother 60    Cancer Father 76        renal cancer - cause of death    Diabetes Sister 50         Current Outpatient Medications   Medication Sig Dispense Refill    ACETAMINOPHEN PO Take 325 mg by mouth as  needed for pain (4-6 hours)       blood glucose monitoring (ONE TOUCH ULTRA 2) meter device kit Use to test blood sugar 2 times daily or as directed. 1 kit 0    camphor-menthol (DERMASARRA) 0.5-0.5 % external lotion APPLY TO THE AFFECTED AREA(S) Topically AS DIRECTED AS NEEDED 222 mL 4    celecoxib (CELEBREX) 200 MG capsule Take 1 capsule (200 mg) by mouth daily 30 capsule 5    cetirizine (ZYRTEC) 10 MG tablet TAKE ONE (1) TABLET BY MOUTH DAILY 30 tablet 11    chlorhexidine (PERIDEX) 0.12 % solution RINSE WITH 15 MLS ORALLY TWICE DAILY 473 mL 1    Easy Touch Safety Lancets 28G MISC Use to test blood sugar twice daily 100 each 3    empagliflozin (JARDIANCE) 10 MG TABS tablet Take 1 tablet (10 mg) by mouth daily 30 tablet 3    famotidine (PEPCID) 40 MG tablet Take 1 tablet (40 mg) by mouth daily 90 tablet 1    glipiZIDE (GLUCOTROL XL) 5 MG 24 hr tablet Take 1 tablet (5 mg) by mouth daily 30 tablet 5    metFORMIN (GLUCOPHAGE) 1000 MG tablet TAKE ONE (1) TABLET BY MOUTH TWICE DAILY 60 tablet 5    mineral oil-hydrophilic petrolatum (AQUAPHOR) external ointment Apply topically as needed 396 g 1    Multiple Vitamins-Iron (GNP ONE DAILY PLUS IRON) TABS TAKE ONE (1) TABLET BY MOUTH DAILY 100 tablet 3    Multiple Vitamins-Minerals (SENTRY) TABS TAKE ONE (1) TABLET BY MOUTH DAILY 100 tablet 3    ONETOUCH ULTRA TEST test strip Use to test blood sugar 2 times daily or as directed. 200 strip 3    order for DME Equipment being ordered: Wheeled walker with seat    Patient has frequent falls, and need to rest often (needs seat) 1 Device 0    ramipril (ALTACE) 5 MG capsule TAKE ONE (1) CAPSULE BY MOUTH DAILY 30 capsule 11    simvastatin (ZOCOR) 10 MG tablet TAKE ONE (1) TABLET BY MOUTH DAILY 90 tablet 1    triamcinolone (KENALOG) 0.1 % external cream APPLY TO AFFECTED AREA(S) TOPICALLY TWICE DAILY 80 g 11     Allergies   Allergen Reactions    Tape [Adhesive Tape]      Current providers sharing in care for this patient  "include:  Patient Care Team:  Nicole Rojas MD as PCP - General  Nicole Rojas MD as Assigned PCP    The following health maintenance items are reviewed in Epic and correct as of today:  Health Maintenance   Topic Date Due    ZOSTER IMMUNIZATION (1 of 2) Never done    RSV VACCINE (1 - Risk 60-74 years 1-dose series) Never done    COLORECTAL CANCER SCREENING  03/07/2021    DTAP/TDAP/TD IMMUNIZATION (2 - Td or Tdap) 09/19/2022    COVID-19 Vaccine (6 - 2024-25 season) 09/01/2024    MEDICARE ANNUAL WELLNESS VISIT  10/02/2024    MICROALBUMIN  04/02/2025    DIABETIC FOOT EXAM  04/02/2025    A1C  04/03/2025    EYE EXAM  09/18/2025    BMP  10/03/2025    LIPID  10/03/2025    FALL RISK ASSESSMENT  10/03/2025    ADVANCE CARE PLANNING  10/04/2027    HEPATITIS C SCREENING  Completed    PHQ-2 (once per calendar year)  Completed    INFLUENZA VACCINE  Completed    Pneumococcal Vaccine: 65+ Years  Completed    URINALYSIS  Completed    HPV IMMUNIZATION  Aged Out    MENINGITIS IMMUNIZATION  Aged Out    RSV MONOCLONAL ANTIBODY  Aged Out         Review of Systems  Constitutional, HEENT, cardiovascular, pulmonary, gi and gu systems are negative, except as otherwise noted.     Objective    Exam  /70 (BP Location: Right arm, Patient Position: Sitting, Cuff Size: Adult Regular)   Pulse 88   Temp (!) 96.4  F (35.8  C) (Tympanic)   Resp 16   Ht 1.626 m (5' 4\")   Wt 66.2 kg (146 lb)   SpO2 98%   BMI 25.06 kg/m     Estimated body mass index is 25.06 kg/m  as calculated from the following:    Height as of this encounter: 1.626 m (5' 4\").    Weight as of this encounter: 66.2 kg (146 lb).    Physical Exam  GENERAL: alert, no distress, and fairly cooperative with exam today  EYES: Eyes grossly normal to inspection, PERRL and conjunctivae and sclerae normal  HENT: normal cephalic/atraumatic, nose and mouth without ulcers or lesions, oropharynx clear, and oral mucous membranes moist  NECK: no adenopathy  RESP: lungs clear " to auscultation - no rales, rhonchi or wheezes  CV: regular rate and rhythm, normal S1 S2, no S3 or S4, no murmur, click or rub, no peripheral edema  MS: no gross musculoskeletal defects noted, no edema  PSYCH: affect normal        10/3/2024   Mini Cog   Mini-Cog Not Completed (choose reason) Mental handicap                 Signed Electronically by: Nicole Rojas MD

## 2024-10-07 DIAGNOSIS — I10 ESSENTIAL HYPERTENSION: ICD-10-CM

## 2024-10-07 DIAGNOSIS — E11.9 TYPE 2 DIABETES MELLITUS WITHOUT COMPLICATION, WITHOUT LONG-TERM CURRENT USE OF INSULIN (H): ICD-10-CM

## 2024-10-08 RX ORDER — GLIPIZIDE 5 MG/1
TABLET, FILM COATED, EXTENDED RELEASE ORAL
Qty: 30 TABLET | Refills: 11 | Status: SHIPPED | OUTPATIENT
Start: 2024-10-08

## 2024-10-08 RX ORDER — RAMIPRIL 5 MG/1
CAPSULE ORAL
Qty: 30 CAPSULE | Refills: 11 | Status: SHIPPED | OUTPATIENT
Start: 2024-10-08

## 2024-10-22 DIAGNOSIS — Z00.00 HEALTHCARE MAINTENANCE: ICD-10-CM

## 2024-10-22 RX ORDER — CHLORHEXIDINE GLUCONATE ORAL RINSE 1.2 MG/ML
SOLUTION DENTAL
Qty: 473 ML | Refills: 1 | Status: SHIPPED | OUTPATIENT
Start: 2024-10-22

## 2024-10-22 NOTE — TELEPHONE ENCOUNTER
chlorhexidine gluconate 0.12 % mouthwash         Last Written Prescription Date:  9/19/24  Last Fill Quantity: 473 mL,   # refills: 1  Last Office Visit: 10/3/24  Future Office visit:       Routing refill request to provider for review/approval because:  Drug not on the [facility], P or [facility] refill protocol or controlled substance

## 2024-11-08 DIAGNOSIS — E11.65 TYPE 2 DIABETES MELLITUS WITH HYPERGLYCEMIA, WITHOUT LONG-TERM CURRENT USE OF INSULIN (H): ICD-10-CM

## 2024-11-08 RX ORDER — LANCETS 28 GAUGE
EACH MISCELLANEOUS
Qty: 100 EACH | Refills: 3 | Status: SHIPPED | OUTPATIENT
Start: 2024-11-08

## 2024-11-12 DIAGNOSIS — L50.9 HIVES: ICD-10-CM

## 2024-11-12 RX ORDER — CETIRIZINE HYDROCHLORIDE 10 MG/1
TABLET ORAL
Qty: 30 TABLET | Refills: 11 | Status: SHIPPED | OUTPATIENT
Start: 2024-11-12

## 2024-11-12 NOTE — TELEPHONE ENCOUNTER
cetirizine 10 mg tablet       Last Written Prescription Date:  12/14/2023  Last Fill Quantity: 30,   # refills: 11  Last Office Visit: 10/03/2024  Future Office visit:       Routing refill request to provider for review/approval because:    Antihistamines Protocol [id number]/12/2024 01:48 PM   Protocol Details Patient is 3-64 years of age        Kimberly Boecker, RN

## 2024-12-05 ENCOUNTER — OFFICE VISIT (OUTPATIENT)
Dept: PSYCHIATRY | Facility: OTHER | Age: 69
End: 2024-12-05
Payer: MEDICARE

## 2024-12-05 VITALS
DIASTOLIC BLOOD PRESSURE: 62 MMHG | TEMPERATURE: 98.3 F | SYSTOLIC BLOOD PRESSURE: 110 MMHG | HEART RATE: 95 BPM | OXYGEN SATURATION: 98 % | BODY MASS INDEX: 25.06 KG/M2 | RESPIRATION RATE: 16 BRPM | WEIGHT: 146 LBS

## 2024-12-05 DIAGNOSIS — F42.9 OBSESSIVE-COMPULSIVE DISORDER, UNSPECIFIED TYPE: Primary | ICD-10-CM

## 2024-12-05 DIAGNOSIS — F41.9 ANXIETY: ICD-10-CM

## 2024-12-05 DIAGNOSIS — F79 INTELLECTUAL DISABILITY: ICD-10-CM

## 2024-12-05 RX ORDER — FLUVOXAMINE MALEATE 25 MG
25 TABLET ORAL AT BEDTIME
Qty: 30 TABLET | Refills: 2 | Status: SHIPPED | OUTPATIENT
Start: 2024-12-05

## 2024-12-05 ASSESSMENT — PAIN SCALES - GENERAL: PAINLEVEL_OUTOF10: NO PAIN (0)
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
## 2024-12-05 NOTE — PATIENT INSTRUCTIONS
- fluvoxamine (Luvox) 25 mg daily  - Follow up in 4-6 weeks, in person or by telephone  Thank you for allowing CANDE Mcconnell, [facility] to participate in your care.  If you have a scheduling or an appointment question please contact our scheduling department at their direct line [phone].   ALL nursing questions or concerns can be directed to the psychiatry nurses at [phone] or [phone]    Community [facility]  Crisis Text Line: text or call 183  Suicide LifeLine Chat: suicidepreventionlifeline.org/chat  National [place] on Mental Illness (www.shan.org): 463.488.7019 or 611-563-5683.   Mental Health Association (www.mentalhealth.org): 987.752.3015 or 762-440-4994

## 2024-12-19 ENCOUNTER — TELEPHONE (OUTPATIENT)
Dept: PSYCHIATRY | Facility: OTHER | Age: 69
End: 2024-12-19

## 2024-12-19 DIAGNOSIS — F41.9 ANXIETY: ICD-10-CM

## 2024-12-19 DIAGNOSIS — F42.9 OBSESSIVE-COMPULSIVE DISORDER, UNSPECIFIED TYPE: ICD-10-CM

## 2024-12-19 RX ORDER — FLUVOXAMINE MALEATE 50 MG
50 TABLET ORAL AT BEDTIME
Qty: 30 TABLET | Refills: 1 | Status: SHIPPED | OUTPATIENT
Start: 2024-12-19

## 2024-12-19 NOTE — TELEPHONE ENCOUNTER
I called and spoke with Aki's caregiver (Ileana) at [facility] about how the Luvox is going. She states that she believes his anxiety is a bit better. His mood has improved in the recent weeks, but it could also be due to him loving the ximena season as well. He still continues to pick at his skin, but at the same time as the Luvox was prescribed, the facility also changed laundry detergents so Ileana is questioning if the soap/dry season is attributing to that. She states that overall his is not doing worse, but feels that a bump up may be beneficial.

## 2024-12-23 DIAGNOSIS — Z00.00 HEALTHCARE MAINTENANCE: ICD-10-CM

## 2024-12-24 NOTE — TELEPHONE ENCOUNTER
Peridex      Last Written Prescription Date:  12.5.24  Last Fill Quantity: #473mL,   # refills: 0  Last Office Visit: 10.3.24  Future Office visit:    Next 5 appointments (look out 90 days)      Willam 10, 2025 9:30 AM  (Arrive by 9:15 AM)  Return Visit with CANDE Schmidt CNP  [facility] ([facility] )  Arrive at:  PSYCHIATRY [address] [place] [id number]  [phone]             Routing refill request to provider for review/approval because:  Drug not on the [facility], [facility] or  Health refill protocol or controlled substance

## 2024-12-26 RX ORDER — CHLORHEXIDINE GLUCONATE ORAL RINSE 1.2 MG/ML
SOLUTION DENTAL
Qty: 473 ML | Refills: 0 | Status: SHIPPED | OUTPATIENT
Start: 2024-12-26

## 2024-12-27 DIAGNOSIS — L30.9 DERMATITIS: ICD-10-CM

## 2024-12-27 NOTE — TELEPHONE ENCOUNTER
Camphor-Menthol (Dermasarra) 0.5-0.5% external lotion anti itch  Last Written Prescription Date:  04/24/2024  Last Fill Quantity: 222 mL,   # refills: 4  Last Office Visit: 10/03/2024  Future Office visit:    Next 5 appointments (look out 90 days)      Willam 10, 2025 9:30 AM  (Arrive by 9:15 AM)  Return Visit with CANDE Schmidt CNP  [facility] ([facility] )  Arrive at:  PSYCHIATRY [address] [place] [id number] [phone]             Routing refill request to provider for review/approval because:

## 2024-12-30 RX ORDER — CAMPHOR, MENTHOL .5; .5 G/100G; G/100G
LOTION TOPICAL
Qty: 222 ML | Refills: 3 | Status: SHIPPED | OUTPATIENT
Start: 2024-12-30

## 2025-01-06 DIAGNOSIS — N18.1 TYPE 2 DIABETES MELLITUS WITH STAGE 1 CHRONIC KIDNEY DISEASE, WITHOUT LONG-TERM CURRENT USE OF INSULIN (H): ICD-10-CM

## 2025-01-06 DIAGNOSIS — E11.22 TYPE 2 DIABETES MELLITUS WITH STAGE 1 CHRONIC KIDNEY DISEASE, WITHOUT LONG-TERM CURRENT USE OF INSULIN (H): ICD-10-CM

## 2025-01-06 RX ORDER — EMPAGLIFLOZIN 10 MG/1
10 TABLET, FILM COATED ORAL DAILY
Qty: 30 TABLET | Refills: 2 | Status: SHIPPED | OUTPATIENT
Start: 2025-01-06

## 2025-01-08 DIAGNOSIS — Z00.00 HEALTHCARE MAINTENANCE: ICD-10-CM

## 2025-01-08 DIAGNOSIS — L30.9 DERMATITIS: ICD-10-CM

## 2025-01-08 RX ORDER — CHLORHEXIDINE GLUCONATE ORAL RINSE 1.2 MG/ML
SOLUTION DENTAL
Qty: 473 ML | Refills: 0 | Status: SHIPPED | OUTPATIENT
Start: 2025-01-08

## 2025-01-08 NOTE — TELEPHONE ENCOUNTER
chlorhexidine gluconate 0.12 % mouthwash       Last Written Prescription Date:  12/26/24  Last Fill Quantity: 473ml,   # refills: 0  Last Office Visit: 10/3/24  Future Office visit:    Next 5 appointments (look out 90 days)      Willam 10, 2025 9:30 AM  (Arrive by 9:15 AM)  Return Visit with CANDE Schmidt CNP  [facility] ([facility] )  Arrive at:  PSYCHIATRY [address] [place] [id number] [phone]             Routing refill request to provider for review/approval because:  Drug not on the [facility], [facility] or [facility] refill protocol or controlled substance

## 2025-01-09 NOTE — TELEPHONE ENCOUNTER
Aquaphor ointment      Last Written Prescription Date:  12-20-24  Last Fill Quantity: 396 G,   # refills: 0  Last Office Visit: 10-3-24  Future Office visit:    Next 5 appointments (look out 90 days)      Willam 10, 2025 9:30 AM  (Arrive by 9:15 AM)  Return Visit with CANDE Schmidt CNP  [facility] [place] ([facility] [place] )  Arrive at:  PSYCHIATRY [address] [place] [id number] [phone]

## 2025-01-10 ENCOUNTER — OFFICE VISIT (OUTPATIENT)
Dept: PSYCHIATRY | Facility: OTHER | Age: 70
End: 2025-01-10
Payer: COMMERCIAL

## 2025-01-10 VITALS
DIASTOLIC BLOOD PRESSURE: 70 MMHG | RESPIRATION RATE: 18 BRPM | HEART RATE: 100 BPM | TEMPERATURE: 99.2 F | SYSTOLIC BLOOD PRESSURE: 106 MMHG | WEIGHT: 146 LBS | BODY MASS INDEX: 25.06 KG/M2 | OXYGEN SATURATION: 99 %

## 2025-01-10 DIAGNOSIS — F42.9 OBSESSIVE-COMPULSIVE DISORDER, UNSPECIFIED TYPE: ICD-10-CM

## 2025-01-10 DIAGNOSIS — F41.9 ANXIETY: ICD-10-CM

## 2025-01-10 PROCEDURE — 99214 OFFICE O/P EST MOD 30 MIN: CPT

## 2025-01-10 PROCEDURE — G0463 HOSPITAL OUTPT CLINIC VISIT: HCPCS

## 2025-01-10 RX ORDER — FLUVOXAMINE MALEATE 100 MG
100 TABLET ORAL AT BEDTIME
Qty: 30 TABLET | Refills: 2 | Status: SHIPPED | OUTPATIENT
Start: 2025-01-10

## 2025-01-10 RX ORDER — FLUVOXAMINE MALEATE 50 MG
50 TABLET ORAL EVERY MORNING
Qty: 30 TABLET | Refills: 2 | Status: SHIPPED | OUTPATIENT
Start: 2025-01-10

## 2025-01-10 ASSESSMENT — PAIN SCALES - GENERAL: PAINLEVEL_OUTOF10: NO PAIN (0)

## 2025-01-10 NOTE — TELEPHONE ENCOUNTER
Routing refill request to provider for review/approval because:  Drug not on the [facility], [facility] or [facility] refill protocol or controlled substance

## 2025-01-10 NOTE — PROGRESS NOTES
OUTPATIENT PSYCHIATRY: FOLLOW UP ASSESSMENT (ADULT)     Identifying Information:  Aki Navarro MRN# [id number]   Age: 69 year old YOB: 1955     Initial Psychiatry Visit Date: 12/5/24        Assessment & Plan     This is a 69 year old male patient who is seen today for follow up.  Add a morning dose of Luvox 50 mg. Increase bedtime Luvox dose to 100 mg (ok to start once current bubble pack is finished)    (F42.9) Obsessive-compulsive disorder, unspecified type  (primary encounter diagnosis)  Plan: fluvoxaMINE (LUVOX) 25 MG tablet Take 1 tablet (25 mg) by mouth at bedtime        (F79) Intellectual disability  Plan: Continue with community programs, [facility] program      (F41.9) Anxiety  Plan: fluvoxaMINE (LUVOX) 25 MG tablet Take 1 tablet (25 mg) by mouth at bedtime     Laboratory: {NONE:265383}    Referrals: {NONE:356977}    Follow Up: Return for follow up in ***          History of Present Illness     Aki Navarro is a 69 year old male with a reported history of *** who is here today for follow up. Aki attended the appointment with group home staffMc. {[facility] SESSION ATTENDANCE:546911}. Aki was last seen on 12/5/24 when we started Luvox 25 mg daily to target OCD for skin-picking and anxiety.  Checked in 12/19 to see how the Luvox was going - [facility] staff believed anxiety was a bit better, mood had improved in recent weeks, but could also be because he loves the Jorge season. Skin picking continued but they also changed laundry detergent and just the dryness of the season. We did bump up the Luvox to 50 mg daily.  He is now going out into the living room, changing the channel to home shopping network and then leave the room.  Otherwise no changes in mood.         Psychiatric Review of Symptoms     Mood/Depression: Difficult to assess because he is mostly non-verbal. Per staff, they do not witness any depressed mood or depressive symptoms.     Anxiety: Aki does not  describe any anxious symptoms due to being mostly non-verbal but he does exhibit anxious behaviors as described, including OCD-type behaviors.     OCD: Picking behaviors - shoulders, knees. Uses lotions but it keeps reopening scratches. Aki is a very anxious individual and exhibits obsessive-compulsive behaviors. He goes around the house, and at his [facility] program touching items, checking things, putting things away, has a routine he has to go through. Stocking things, he used to always want to fill everything.     Panic Attacks: Staff have not witnessed panic attacks.     Sleep: generally seems to sleep quite well, gets up 1-2 times during the night to go to the bathroom, but goes right back to bed.     Appetite: picky eater, but otherwise eats great. No weight concerns.     Concentration/Distractibility: Per staff, does not appear to be problematic     Activity/Energy: constantly moving, but occasionally does nap during the day, or after work.     Current psychosocial stressors: unable to assess    Substance Use: No change. Current use includes: none    Suicidal Ideation: unable to assess but there have been no exhibited symptoms     Homicidal Ideation: unable to assess but there have been no exhibited symptoms     Therapy: No         Past Medical/Surgical History     Medical diagnoses:   Past Medical History:   Diagnosis Date    Allergic rhinitis, cause unspecified 1/11/2011    Arthritis 9/24/2013    Essential hypertension, benign 1/11/2011    Impacted cerumen of both ears 10/12/2012    Other and unspecified hyperlipidemia 1/11/2011    Type II or unspecified type diabetes mellitus without mention of complication 4/28/2005    Unspecified intellectual disabilities 1/11/2011     Surgical history:   Past Surgical History:   Procedure Laterality Date    MOUTH SURGERY  2008    undescended testicle surgery              Medical Review of Systems     Allergies: Tape [adhesive tape]          Vital Signs: There were no  vitals taken for this visit.          Weight: 0 lbs 0 oz  BMI: There is no height or weight on file to calculate BMI.    Physical Exam: Please refer to physical exam completed by primary care provider.    10 point review of systems is otherwise negative unless noted above.           Mental Status Examination     ***         Labs     No results found for this or any previous visit (from the past 24 hours).    Labs were reviewed, no concerns.         Medications                                                                  BOLD psych meds     I have reviewed this patient's current medications.    Current Outpatient Medications   Medication Sig Dispense Refill    ACETAMINOPHEN PO Take 325 mg by mouth as needed for pain (4-6 hours)       blood glucose monitoring (ONE TOUCH ULTRA 2) meter device kit Use to test blood sugar 2 times daily or as directed. 1 kit 0    camphor-menthol (GNP ANTI-ITCH) 0.5-0.5 % external lotion APPLY TO THE AFFECTED AREA(S) Topically AS DIRECTED AS NEEDED 222 mL 3    celecoxib (CELEBREX) 200 MG capsule Take 1 capsule (200 mg) by mouth daily 30 capsule 5    cetirizine (ZYRTEC) 10 MG tablet TAKE ONE (1) TABLET BY MOUTH DAILY 30 tablet 11    chlorhexidine (PERIDEX) 0.12 % solution RINSE WITH 15 MLS ORALLY TWICE DAILY 473 mL 0    Easy Touch Lancets 28G MISC Use to test blood sugar twice daily 100 each 3    empagliflozin (JARDIANCE) 10 MG TABS tablet Take 1 tablet (10 mg) by mouth daily 30 tablet 2    famotidine (PEPCID) 40 MG tablet Take 1 tablet (40 mg) by mouth daily 90 tablet 1    fluvoxaMINE (LUVOX) 50 MG tablet Take 1 tablet (50 mg) by mouth at bedtime. 30 tablet 1    glipiZIDE (GLUCOTROL XL) 5 MG 24 hr tablet Take 1 tablet (5 mg) by mouth daily 30 tablet 11    metFORMIN (GLUCOPHAGE) 1000 MG tablet TAKE ONE (1) TABLET BY MOUTH TWICE DAILY 60 tablet 11    mineral oil-hydrophilic petrolatum (AQUAPHOR) external ointment Apply topically as needed 396 g 0    Multiple Vitamins-Iron (GNP ONE DAILY  PLUS IRON) TABS TAKE ONE (1) TABLET BY MOUTH DAILY 100 tablet 3    Multiple Vitamins-Minerals (SENTRY) TABS TAKE ONE (1) TABLET BY MOUTH DAILY 100 tablet 3    ONETOUCH ULTRA TEST test strip Use to test blood sugar 2 times daily or as directed. 200 strip 3    order for DME Equipment being ordered: Wheeled walker with seat    Patient has frequent falls, and need to rest often (needs seat) 1 Device 0    ramipril (ALTACE) 5 MG capsule TAKE ONE (1) CAPSULE BY MOUTH DAILY 30 capsule 11    simvastatin (ZOCOR) 10 MG tablet TAKE ONE (1) TABLET BY MOUTH DAILY 90 tablet 1    triamcinolone (KENALOG) 0.1 % external cream APPLY TO AFFECTED AREA(S) TOPICALLY TWICE DAILY 80 g 11     No current facility-administered medications for this visit.       Reviewed PDMP: ***          PHQ-9 / EREN-7                       Reviewed PHQ-9 and EREN-7 screenings.         9/2/2021     9:00 AM   PHQ-9 SCORE   PHQ-9 Total Score 1         9/2/2021     9:00 AM   EREN-7 SCORE   Total Score 5                *** minutes spent by me on the date of the encounter doing {2021 E&M time in:582423}     CANDE Mcconnell, [facility]-BC    Patient was instructed to monitor for worsening symptoms and side effects from medications. If there is a serious deterioration of mood or any dangerous-type behaviors (suicidal/homicidal ideations) patient is advised to call 911 or report directly to the nearest Emergency Department.     Treatment Risk Statement: The risks, benefits, alternatives and potential adverse effects have been explained and are understood by the patient. The patient agrees to the treatment plan with the ability to do so. The patient knows to call the clinic for any problems or access emergency care if needed.     screenings.         9/2/2021     9:00 AM   PHQ-9 SCORE   PHQ-9 Total Score 1         9/2/2021     9:00 AM   EREN-7 SCORE   Total Score 5              36 minutes spent by me on the date of the encounter doing chart review, patient visit, documentation, and discussion with other provider(s)     CANDE Mcconnell, FRANCINE-BC    Patient was instructed to monitor for worsening symptoms and side effects from medications. If there is a serious deterioration of mood or any dangerous-type behaviors (suicidal/homicidal ideations) patient is advised to call 911 or report directly to the nearest Emergency Department.     Treatment Risk Statement: The risks, benefits, alternatives and potential adverse effects have been explained and are understood by the patient. The patient agrees to the treatment plan with the ability to do so. The patient knows to call the clinic for any problems or access emergency care if needed.

## 2025-01-13 RX ORDER — MINERAL OIL/HYDROPHIL PETROLAT
OINTMENT (GRAM) TOPICAL PRN
Qty: 396 G | Refills: 0 | Status: SHIPPED | OUTPATIENT
Start: 2025-01-13

## 2025-02-03 DIAGNOSIS — Z00.00 HEALTHCARE MAINTENANCE: ICD-10-CM

## 2025-02-03 DIAGNOSIS — K21.9 GASTROESOPHAGEAL REFLUX DISEASE, UNSPECIFIED WHETHER ESOPHAGITIS PRESENT: ICD-10-CM

## 2025-02-03 DIAGNOSIS — E78.2 MIXED HYPERLIPIDEMIA: ICD-10-CM

## 2025-02-04 RX ORDER — FAMOTIDINE 40 MG/1
40 TABLET, FILM COATED ORAL DAILY
Qty: 90 TABLET | Refills: 1 | Status: SHIPPED | OUTPATIENT
Start: 2025-02-04

## 2025-02-04 RX ORDER — SIMVASTATIN 10 MG
TABLET ORAL
Qty: 90 TABLET | Refills: 1 | Status: SHIPPED | OUTPATIENT
Start: 2025-02-04

## 2025-02-04 NOTE — TELEPHONE ENCOUNTER
chlorhexidine (PERIDEX) 0.12 % solution 473 mL 0 1/8/2025     Last Office Visit: 10/3/24     Future Office visit:    Next 5 appointments (look out 90 days)      Mar 07, 2025 10:30 AM  (Arrive by 10:15 AM)  Return Visit with CANDE Schmidt CNP  [facility] ([facility] )  Arrive at:  PSYCHIATRY [address] [facility] [address] [phone]             Routing refill request to provider for review/approval because:

## 2025-02-05 RX ORDER — CHLORHEXIDINE GLUCONATE ORAL RINSE 1.2 MG/ML
SOLUTION DENTAL
Qty: 473 ML | Refills: 0 | Status: SHIPPED | OUTPATIENT
Start: 2025-02-05

## 2025-02-05 NOTE — TELEPHONE ENCOUNTER
chlorhexidine gluconate 0.12 % mouthwash       Routing refill request to provider for review/approval because:  Drug not on the [facility] or [facility] refill protocol or controlled substance

## 2025-02-07 ENCOUNTER — HOSPITAL ENCOUNTER (EMERGENCY)
Facility: HOSPITAL | Age: 70
Discharge: HOME | End: 2025-02-07
Attending: INTERNAL MEDICINE
Payer: COMMERCIAL

## 2025-02-07 ENCOUNTER — APPOINTMENT (OUTPATIENT)
Dept: CT IMAGING | Facility: HOSPITAL | Age: 70
End: 2025-02-07
Attending: INTERNAL MEDICINE
Payer: COMMERCIAL

## 2025-02-07 VITALS
BODY MASS INDEX: 26.09 KG/M2 | TEMPERATURE: 98.6 F | RESPIRATION RATE: 16 BRPM | SYSTOLIC BLOOD PRESSURE: 135 MMHG | OXYGEN SATURATION: 98 % | WEIGHT: 152 LBS | DIASTOLIC BLOOD PRESSURE: 86 MMHG | HEART RATE: 87 BPM

## 2025-02-07 DIAGNOSIS — S09.90XA INJURY OF HEAD, INITIAL ENCOUNTER: ICD-10-CM

## 2025-02-07 DIAGNOSIS — S01.01XA SCALP LACERATION, INITIAL ENCOUNTER: ICD-10-CM

## 2025-02-07 PROCEDURE — 70450 CT HEAD/BRAIN W/O DYE: CPT

## 2025-02-07 PROCEDURE — 99284 EMERGENCY DEPT VISIT MOD MDM: CPT | Mod: 25

## 2025-02-07 PROCEDURE — 99283 EMERGENCY DEPT VISIT LOW MDM: CPT | Performed by: INTERNAL MEDICINE

## 2025-02-07 ASSESSMENT — ACTIVITIES OF DAILY LIVING (ADL): ADLS_ACUITY_SCORE: 41

## 2025-02-07 ASSESSMENT — COLUMBIA-SUICIDE SEVERITY RATING SCALE - C-SSRS: IS THE PATIENT NOT ABLE TO COMPLETE C-SSRS: UNABLE TO VERBALIZE

## 2025-02-08 NOTE — ED TRIAGE NOTES
Patient arrives by [facility] from [facility]. EMS reports that patient was in his room and staff stated that they heard a sound from his room and found patient on the floor outside his room with his walker away from him. Staff and EMS deny loss of consciousness. Bleeding controlled on arrival and EMS stated that bleeding was controlled upon their arrival to the scene. Patient escorted by caretaker from [facility]. Patient roomed and connected to Spo2 and BP monitors. Call light within reach. Caretaker at bedside.      Triage Assessment (Adult)       Row Name 02/07/25 2206          Triage Assessment    Airway WDL WDL        Respiratory WDL    Respiratory WDL WDL        Peripheral/Neurovascular WDL    Peripheral Neurovascular WDL WDL

## 2025-02-08 NOTE — ED NOTES
AVS reviewed with caretaker. All questions and concerns answered. Education reviewed, states no further questions at this time.

## 2025-02-09 ASSESSMENT — ENCOUNTER SYMPTOMS
FEVER: 0
COUGH: 0
ABDOMINAL PAIN: 0
SHORTNESS OF BREATH: 0

## 2025-02-09 NOTE — ED PROVIDER NOTES
History     Chief Complaint   Patient presents with    Head Laceration     The history is provided by a caregiver.   Trauma  Mechanism of injury: Fall  Injury location: head/neck  Injury location detail: head  Incident location: home  Time since incident: 1 hour     Fall:       Fall occurred: tripped       Impact surface: hard floor       Point of impact: head    Current symptoms:       Associated symptoms:             Denies abdominal pain and chest pain.         Allergies:  Allergies   Allergen Reactions    Tape [Adhesive Tape]        Problem List:    Patient Active Problem List    Diagnosis Date Noted    Chronic kidney disease, stage 1 03/08/2022     Priority: Medium    Arthritis 09/24/2013     Priority: Medium    Advanced care planning/counseling discussion 09/19/2012     Priority: Medium    Essential hypertension, benign 01/11/2011     Priority: Medium    Hyperlipidemia 01/11/2011     Priority: Medium    Intellectual disability 01/11/2011     Priority: Medium    Diabetes mellitus, type 2 (H) 04/28/2005     Priority: Medium        Past Medical History:    Past Medical History:   Diagnosis Date    Allergic rhinitis, cause unspecified 1/11/2011    Arthritis 9/24/2013    Essential hypertension, benign 1/11/2011    Impacted cerumen of both ears 10/12/2012    Other and unspecified hyperlipidemia 1/11/2011    Type II or unspecified type diabetes mellitus without mention of complication 4/28/2005    Unspecified intellectual disabilities 1/11/2011       Past Surgical History:    Past Surgical History:   Procedure Laterality Date    MOUTH SURGERY  2008    undescended testicle surgery         Family History:    Family History   Problem Relation Age of Onset    Cancer Mother         brain cancer - cause of death    Diabetes Mother 60    Cancer Father 76        renal cancer - cause of death    Diabetes Sister 50       Social History:  Marital Status:  Single [1]  Social History     Tobacco Use    Smoking status: Never     Smokeless tobacco: Never   Vaping Use    Vaping status: Never Used   Substance Use Topics    Alcohol use: Yes     Comment: 1 beer occasionally    Drug use: No        Medications:    ACETAMINOPHEN PO  blood glucose monitoring (ONE TOUCH ULTRA 2) meter device kit  camphor-menthol (GNP ANTI-ITCH) 0.5-0.5 % external lotion  celecoxib (CELEBREX) 200 MG capsule  cetirizine (ZYRTEC) 10 MG tablet  chlorhexidine (PERIDEX) 0.12 % solution  Easy Touch Lancets 28G MISC  empagliflozin (JARDIANCE) 10 MG TABS tablet  famotidine (PEPCID) 40 MG tablet  fluvoxaMINE (LUVOX) 100 MG tablet  fluvoxaMINE (LUVOX) 50 MG tablet  glipiZIDE (GLUCOTROL XL) 5 MG 24 hr tablet  metFORMIN (GLUCOPHAGE) 1000 MG tablet  mineral oil-hydrophilic petrolatum (AQUAPHOR) external ointment  Multiple Vitamins-Iron (GNP ONE DAILY PLUS IRON) TABS  Multiple Vitamins-Minerals (SENTRY) TABS  ONETOUCH ULTRA TEST test strip  order for DME  ramipril (ALTACE) 5 MG capsule  simvastatin (ZOCOR) 10 MG tablet  triamcinolone (KENALOG) 0.1 % external cream          Review of Systems   Constitutional:  Negative for fever.   Respiratory:  Negative for cough and shortness of breath.    Cardiovascular:  Negative for chest pain.   Gastrointestinal:  Negative for abdominal pain.   Skin:  Negative for rash.   All other systems reviewed and are negative.      Physical Exam   BP: (!) 158/86  Pulse: 96  Temp: 98.6  F (37  C)  Resp: 16  Weight: 68.9 kg (152 lb)  SpO2: 95 %      Physical Exam  Vitals and nursing note reviewed.   Constitutional:       Appearance: He is well-developed.   HENT:      Head: Normocephalic. Abrasion and contusion present.     Eyes:      Conjunctiva/sclera: Conjunctivae normal.      Pupils: Pupils are equal, round, and reactive to light.   Neck:      Thyroid: No thyromegaly.      Vascular: No JVD.      Trachea: No tracheal deviation.   Cardiovascular:      Rate and Rhythm: Normal rate and regular rhythm.      Heart sounds: Normal heart sounds. No murmur  heard.     No gallop.   Pulmonary:      Effort: Pulmonary effort is normal. No respiratory distress.      Breath sounds: Normal breath sounds. No stridor. No wheezing or rales.   Chest:      Chest wall: No tenderness.   Abdominal:      General: Bowel sounds are normal. There is no distension.      Palpations: Abdomen is soft. There is no mass.      Tenderness: There is no abdominal tenderness. There is no guarding or rebound.   Musculoskeletal:         General: No tenderness. Normal range of motion.      Cervical back: Normal range of motion and neck supple.   Lymphadenopathy:      Cervical: No cervical adenopathy.   Skin:     General: Skin is warm.      Coloration: Skin is not pale.      Findings: No erythema or rash.   Neurological:      Mental Status: He is alert. Mental status is at baseline.   Psychiatric:         Behavior: Behavior normal.         ED Course        Procedures                No results found for this or any previous visit (from the past 24 hours).    Medications - No data to display    Assessments & Plan (with Medical Decision Making)   Contusion and abrasion of left occipital scalp  Fall , head injury  CT head: no acute finding  D C Home  I have reviewed the nursing notes.    I have reviewed the findings, diagnosis, plan and need for follow up with the patient.        Discharge Medication List as of 2/7/2025 11:18 PM          Final diagnoses:   Injury of head, initial encounter   Scalp laceration, initial encounter       2/7/2025   [facility]       Kian Guadalupe MD  02/09/25 0536

## 2025-02-13 ENCOUNTER — TELEPHONE (OUTPATIENT)
Dept: FAMILY MEDICINE | Facility: OTHER | Age: 70
End: 2025-02-13

## 2025-02-13 NOTE — TELEPHONE ENCOUNTER
Pt caregiver from group home (Ileana) called back and per the ER needed a follow-up from ER visit on 02/07/2025.    Per care giver pt is fine and the laceration looks fine.      Pt scheduled per caregivers request with PCP.  They were offered an earlier appointment and declined.  Caregiver was encouraged if pt has any changes to call back to get an earlier appointment date, caregiver stated she would.

## 2025-02-13 NOTE — TELEPHONE ENCOUNTER
Symptom or reason needing to speak to RN: Heather ( ) called and said that the Pt was seen at the [place] ER on 02/07/25 due to a fall causing a scalp lac, no loss of consciousness, he did get glue to close the wound which is on the back middle/left side. He needs an ER follow appointment made      Best number to return call: [phone] Or [phone] Group sup       Best time to return call: soon as possible

## 2025-02-17 DIAGNOSIS — Z00.00 HEALTHCARE MAINTENANCE: ICD-10-CM

## 2025-02-17 NOTE — PROGRESS NOTES
"  Assessment & Plan     1. Laceration of scalp, subsequent encounter  Doing well, can use Aquaphor or similar to help with scabbing.  Follow-up as needed.    2. Fall in home, subsequent encounter (Primary)        The longitudinal plan of care for the diagnosis(es)/condition(s) as documented were addressed during this visit. Due to the added complexity in care, I will continue to support Aki in the subsequent management and with ongoing continuity of care.     MED REC REQUIRED  Post Medication Reconciliation Status: discharge medications reconciled, continue medications without change  BMI  Estimated body mass index is 25.1 kg/m  as calculated from the following:    Height as of 10/3/24: 1.626 m (5' 4\").    Weight as of this encounter: 66.3 kg (146 lb 3.2 oz).       Return if symptoms worsen or fail to improve.      Subjective   Aki is a 69 year old, presenting for the following health issues:  ER F/U    HPI     ED/UC Followup:    Facility:  [facility]  Date of visit: 02/07/25  Reason for visit: injury of head  Current Status: Staff states he seems good, no change  Patient is doing well.  Staff notes he tries to be independent at times, and if he falls, he won't tell anyone.      Review of Systems  Constitutional, HEENT, cardiovascular, pulmonary, gi and gu systems are negative, except as otherwise noted.      Objective    /62 (BP Location: Right arm, Patient Position: Sitting, Cuff Size: Adult Regular)   Pulse 95   Temp 97.9  F (36.6  C) (Tympanic)   Resp 18   Wt 66.3 kg (146 lb 3.2 oz)   SpO2 98%   BMI 25.10 kg/m    Body mass index is 25.1 kg/m .  Physical Exam   GENERAL: alert and no distress  PSYCH: mentation appears normal, affect normal/bright          Signed Electronically by: Nicole Rojas MD    "

## 2025-02-18 RX ORDER — CHLORHEXIDINE GLUCONATE ORAL RINSE 1.2 MG/ML
SOLUTION DENTAL
Qty: 473 ML | Refills: 0 | Status: SHIPPED | OUTPATIENT
Start: 2025-02-18

## 2025-02-18 NOTE — TELEPHONE ENCOUNTER
Peridex      Last Written Prescription Date:  02/05/25  Last Fill Quantity: 473ml,   # refills: 0  Last Office Visit: 10/03/24  Future Office visit:    Next 5 appointments (look out 90 days)      Feb 21, 2025 11:00 AM  (Arrive by 10:45 AM)  Provider Visit with Nicole Rojas MD  [facility] ([facility] ) [address] [address]  [phone]     Mar 07, 2025 10:30 AM  (Arrive by 10:15 AM)  Return Visit with CANDE Schmidt CNP  [facility] [place] ([facility] )  Arrive at:  PSYCHIATRY [address] [place]  [place] [id number] [phone]

## 2025-02-18 NOTE — TELEPHONE ENCOUNTER
Routing refill request to provider for review/approval because:  Drug not on the [facility], [facility] or [facility] refill protocol or controlled substance

## 2025-02-21 ENCOUNTER — OFFICE VISIT (OUTPATIENT)
Dept: FAMILY MEDICINE | Facility: OTHER | Age: 70
End: 2025-02-21
Attending: FAMILY MEDICINE
Payer: COMMERCIAL

## 2025-02-21 VITALS
TEMPERATURE: 97.9 F | HEART RATE: 95 BPM | BODY MASS INDEX: 25.1 KG/M2 | RESPIRATION RATE: 18 BRPM | OXYGEN SATURATION: 98 % | SYSTOLIC BLOOD PRESSURE: 114 MMHG | DIASTOLIC BLOOD PRESSURE: 62 MMHG | WEIGHT: 146.2 LBS
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
## 2025-02-21 DIAGNOSIS — S01.01XD LACERATION OF SCALP, SUBSEQUENT ENCOUNTER: Primary | ICD-10-CM

## 2025-02-21 PROCEDURE — G2211 COMPLEX E/M VISIT ADD ON: HCPCS | Performed by: FAMILY MEDICINE

## 2025-02-21 PROCEDURE — G0463 HOSPITAL OUTPT CLINIC VISIT: HCPCS

## 2025-02-21 PROCEDURE — 99212 OFFICE O/P EST SF 10 MIN: CPT | Performed by: FAMILY MEDICINE

## 2025-02-21 ASSESSMENT — PAIN SCALES - GENERAL: PAINLEVEL_OUTOF10: NO PAIN (0)

## 2025-02-27 DIAGNOSIS — Z00.00 HEALTHCARE MAINTENANCE: ICD-10-CM

## 2025-02-27 RX ORDER — CHLORHEXIDINE GLUCONATE ORAL RINSE 1.2 MG/ML
SOLUTION DENTAL
Qty: 473 ML | Refills: 3 | Status: SHIPPED | OUTPATIENT
Start: 2025-02-27

## 2025-02-27 NOTE — TELEPHONE ENCOUNTER
Chlorhexidine (Peridex) 0.12% solution    Last Written Prescription Date:  02/18/2025  Last Fill Quantity: 473 mL,   # refills: 0  Last Office Visit: 02/21/2025  Future Office visit:    Next 5 appointments (look out 90 days)      Mar 07, 2025 10:30 AM  (Arrive by 10:15 AM)  Return Visit with CANDE Schmidt CNP  [facility] ([facility] )  Arrive at:  PSYCHIATRY [address] [place] [id number] [phone]     Apr 22, 2025 10:30 AM  (Arrive by 10:15 AM)  Provider Visit with Nicole Rojas MD  [facility] ([facility] ) [address]  [place] [address]  [phone]             Routing refill request to provider for review/approval because:

## 2025-02-27 NOTE — TELEPHONE ENCOUNTER
Routing refill request to provider for review/approval because:  Drug not on the [facility], [facility] or [facility] refill protocol or controlled substance     Yes

## 2025-03-03 DIAGNOSIS — M19.90 ARTHRITIS: ICD-10-CM

## 2025-03-03 RX ORDER — CELECOXIB 200 MG/1
200 CAPSULE ORAL DAILY
Qty: 30 CAPSULE | Refills: 5 | Status: SHIPPED | OUTPATIENT
Start: 2025-03-03

## 2025-03-03 NOTE — TELEPHONE ENCOUNTER
celecoxib 200 mg capsule       Last Written Prescription Date:  9/9/24  Last Fill Quantity: 30,   # refills: 5  Last Office Visit: 2/21/25  Future Office visit:    Next 5 appointments (look out 90 days)      Mar 07, 2025 10:30 AM  (Arrive by 10:15 AM)  Return Visit with CANDE Schmidt CNP  [facility] ([facility] )  Arrive at:  PSYCHIATRY [address] [place] [id number] [phone]     Apr 22, 2025 10:30 AM  (Arrive by 10:15 AM)  Provider Visit with Nicole Rojas MD  [facility] ([facility] ) [address]  [place] [address]  [phone]             Routing refill request to provider for review/approval because:  NSAID Medications [id number]/03/2025 10:34 AM   Protocol Details   Patient is age 6-64 years    Always Fail Criteria - Chart Review Required

## 2025-03-21 ENCOUNTER — TELEPHONE (OUTPATIENT)
Dept: PSYCHIATRY | Facility: OTHER | Age: 70
End: 2025-03-21

## 2025-03-21 NOTE — TELEPHONE ENCOUNTER
A message was left for [facility] staff (Radha) to return call to discuss how his medications have been working. Direct number provided.

## 2025-03-28 NOTE — TELEPHONE ENCOUNTER
I called again and spoke with Mc. He states that he has not seen a difference with the medication changes. Aki is currenlty taking Luvox 100mg BID and Abilify 2mg daily.

## 2025-03-28 NOTE — TELEPHONE ENCOUNTER
Attempted to contact Billy or Shara to discuss tapering and stopping the Luvox. There has been no improvement in behaviors and possibly dizziness with increased falls and possible increased incontinence.

## 2025-03-28 NOTE — TELEPHONE ENCOUNTER
Received a call from Ileana at [facility] returning my previous voicemail's. She states that she's not sure if this was medication related or just how he is, but since starting the medication he has had 2 bowel movements in his pants and has had unsteadiness. She however states that this has happened in the past as well. He had an incident this week where he bumped his cheek on his nightstand while reaching for something and has an abrasion and black eye from it. He also recently went to stand up and fell to his hands and knees. She also states that the unsteadiness has been a long time issue. She can't pinpoint whether either of these issues have gotten worse since the medications were changed or not; she just wanted the provider aware.

## 2025-04-13 ENCOUNTER — HEALTH MAINTENANCE LETTER (OUTPATIENT)
Age: 70
End: 2025-04-13

## 2025-04-21 NOTE — PROGRESS NOTES
"  Assessment & Plan     1. Type 2 diabetes mellitus with stage 1 chronic kidney disease, without long-term current use of insulin (H) (Primary)  Labs updated, will make medication recommendations based on results.  Follow-up in six months, sooner as needed.  - Albumin Random Urine Quantitative with Creat Ratio; Future  - Hemoglobin A1c; Future  - TSH with free T4 reflex; Future  - Albumin Random Urine Quantitative with Creat Ratio  - Hemoglobin A1c  - TSH with free T4 reflex    2. Mixed hyperlipidemia  As above  - ALT; Future  - Lipid Profile; Future  - ALT  - Lipid Profile    3. Essential hypertension, benign  As above  - Basic metabolic panel; Future  - UA Macroscopic with reflex to Microscopic and Culture; Future  - UA Macroscopic with reflex to Microscopic and Culture  - Basic metabolic panel    4. CKD (chronic kidney disease) stage 2, GFR 60-89 ml/min  As above.  - UA Macroscopic with reflex to Microscopic and Culture; Future  - UA Macroscopic with reflex to Microscopic and Culture       The longitudinal plan of care for the diagnosis(es)/condition(s) as documented were addressed during this visit. Due to the added complexity in care, I will continue to support Aki in the subsequent management and with ongoing continuity of care.     BMI  Estimated body mass index is 25.2 kg/m  as calculated from the following:    Height as of 10/3/24: 1.626 m (5' 4\").    Weight as of this encounter: 66.6 kg (146 lb 12.8 oz).       Follow-up  Return in about 6 months (around 10/22/2025) for Physical Exam, Diabetic Follow-up, Chronic Disease Management, Medication review.    Subjective   Aki is a 69 year old, presenting for the following health issues:  Hypertension, Diabetes, Lipids, and Kidney Problem    HPI      Diabetes Follow-up    How often are you checking your blood sugar? One time daily  What time of day are you checking your blood sugars (select all that apply)?  Before meals  Have you had any blood sugars " above 200?  No  Have you had any blood sugars below 70?  No  What symptoms do you notice when your blood sugar is low?  None  What concerns do you have today about your diabetes? None   Do you have any of these symptoms? (Select all that apply)  No numbness or tingling in feet.  No redness, sores or blisters on feet.  No complaints of excessive thirst.  No reports of blurry vision.  No significant changes to weight.          Hyperlipidemia Follow-Up    Are you regularly taking any medication or supplement to lower your cholesterol?   Yes- Zocor 10 mg  Are you having muscle aches or other side effects that you think could be caused by your cholesterol lowering medication?  No    Hypertension Follow-up    Do you check your blood pressure regularly outside of the clinic? No   Are you following a low salt diet? No  Are your blood pressures ever more than 140 on the top number (systolic) OR more   than 90 on the bottom number (diastolic), for example 140/90? No    BP Readings from Last 2 Encounters:   04/22/25 130/70   03/07/25 118/74     Hemoglobin A1C (%)   Date Value   04/22/2025 6.7 (H)   10/03/2024 6.8 (H)   11/16/2020 6.4 (H)   02/12/2020 6.9 (H)     LDL Cholesterol Calculated (mg/dL)   Date Value   10/03/2024 34   04/02/2024 33   11/16/2020 47   02/12/2020 21         Chronic Kidney Disease Follow-up    Do you take any over the counter pain medicine?: No    Patient falls not infrequently.  He has had two stumbles/falls today, and urinary frequency has increased somewhat.        Review of Systems  Constitutional, HEENT, cardiovascular, pulmonary, gi and gu systems are negative, except as otherwise noted.      Objective    /70 (BP Location: Left arm, Patient Position: Sitting, Cuff Size: Adult Regular)   Pulse 107   Temp 96.9  F (36.1  C) (Tympanic)   Resp 20   Wt 66.6 kg (146 lb 12.8 oz)   SpO2 98%   BMI 25.20 kg/m    Body mass index is 25.2 kg/m .  Physical Exam   GENERAL: alert and no distress  SKIN:  slightly reddened bruise along left cheek bone   PSYCH: mentation appears normal, affect normal/bright          Signed Electronically by: Nicole Rojas MD

## 2025-04-22 ENCOUNTER — OFFICE VISIT (OUTPATIENT)
Dept: FAMILY MEDICINE | Facility: OTHER | Age: 70
End: 2025-04-22
Attending: FAMILY MEDICINE
Payer: COMMERCIAL

## 2025-04-22 VITALS
BODY MASS INDEX: 25.2 KG/M2 | DIASTOLIC BLOOD PRESSURE: 70 MMHG | OXYGEN SATURATION: 98 % | WEIGHT: 146.8 LBS | TEMPERATURE: 96.9 F | RESPIRATION RATE: 20 BRPM | SYSTOLIC BLOOD PRESSURE: 130 MMHG | HEART RATE: 107 BPM

## 2025-04-22 DIAGNOSIS — E11.22 TYPE 2 DIABETES MELLITUS WITH STAGE 1 CHRONIC KIDNEY DISEASE, WITHOUT LONG-TERM CURRENT USE OF INSULIN (H): Primary | ICD-10-CM

## 2025-04-22 DIAGNOSIS — E78.2 MIXED HYPERLIPIDEMIA: ICD-10-CM

## 2025-04-22 DIAGNOSIS — N18.2 CKD (CHRONIC KIDNEY DISEASE) STAGE 2, GFR 60-89 ML/MIN: ICD-10-CM

## 2025-04-22 DIAGNOSIS — I10 ESSENTIAL HYPERTENSION, BENIGN: ICD-10-CM

## 2025-04-22 DIAGNOSIS — N18.1 TYPE 2 DIABETES MELLITUS WITH STAGE 1 CHRONIC KIDNEY DISEASE, WITHOUT LONG-TERM CURRENT USE OF INSULIN (H): Primary | ICD-10-CM

## 2025-04-22 LAB
ALBUMIN UR-MCNC: NEGATIVE MG/DL
ALT SERPL W P-5'-P-CCNC: 20 U/L (ref 0–70)
ANION GAP SERPL CALCULATED.3IONS-SCNC: 10 MMOL/L (ref 7–15)
APPEARANCE UR: CLEAR
BILIRUB UR QL STRIP: NEGATIVE
BUN SERPL-MCNC: 25.6 MG/DL (ref 8–23)
CALCIUM SERPL-MCNC: 9 MG/DL (ref 8.8–10.4)
CHLORIDE SERPL-SCNC: 97 MMOL/L (ref 98–107)
CHOLEST SERPL-MCNC: 109 MG/DL
COLOR UR AUTO: YELLOW
CREAT SERPL-MCNC: 1 MG/DL (ref 0.67–1.17)
CREAT UR-MCNC: 61.7 MG/DL
EGFRCR SERPLBLD CKD-EPI 2021: 82 ML/MIN/1.73M2
EST. AVERAGE GLUCOSE BLD GHB EST-MCNC: 146 MG/DL
FASTING STATUS PATIENT QL REPORTED: NO
FASTING STATUS PATIENT QL REPORTED: NO
GLUCOSE SERPL-MCNC: 253 MG/DL (ref 70–99)
GLUCOSE UR STRIP-MCNC: >=1000 MG/DL
HBA1C MFR BLD: 6.7 %
HCO3 SERPL-SCNC: 25 MMOL/L (ref 22–29)
HDLC SERPL-MCNC: 50 MG/DL
HGB UR QL STRIP: NEGATIVE
KETONES UR STRIP-MCNC: NEGATIVE MG/DL
LDLC SERPL CALC-MCNC: 43 MG/DL
LEUKOCYTE ESTERASE UR QL STRIP: NEGATIVE
MICROALBUMIN UR-MCNC: 89.3 MG/L
MICROALBUMIN/CREAT UR: 144.73 MG/G CR (ref 0–17)
NITRATE UR QL: NEGATIVE
NONHDLC SERPL-MCNC: 59 MG/DL
PH UR STRIP: 6 [PH] (ref 5–7)
POTASSIUM SERPL-SCNC: 4.6 MMOL/L (ref 3.4–5.3)
SODIUM SERPL-SCNC: 132 MMOL/L (ref 135–145)
SP GR UR STRIP: 1.01 (ref 1–1.03)
TRIGL SERPL-MCNC: 80 MG/DL
TSH SERPL DL<=0.005 MIU/L-ACNC: 2.85 UIU/ML (ref 0.3–4.2)
UROBILINOGEN UR STRIP-ACNC: 0.2 E.U./DL

## 2025-04-22 PROCEDURE — 82043 UR ALBUMIN QUANTITATIVE: CPT | Mod: ZL | Performed by: FAMILY MEDICINE

## 2025-04-22 PROCEDURE — 84443 ASSAY THYROID STIM HORMONE: CPT | Mod: ZL | Performed by: FAMILY MEDICINE

## 2025-04-22 PROCEDURE — 84460 ALANINE AMINO (ALT) (SGPT): CPT | Mod: ZL | Performed by: FAMILY MEDICINE

## 2025-04-22 PROCEDURE — 81003 URINALYSIS AUTO W/O SCOPE: CPT | Mod: ZL | Performed by: FAMILY MEDICINE

## 2025-04-22 PROCEDURE — G0463 HOSPITAL OUTPT CLINIC VISIT: HCPCS | Mod: 25

## 2025-04-22 PROCEDURE — 83036 HEMOGLOBIN GLYCOSYLATED A1C: CPT | Mod: ZL | Performed by: FAMILY MEDICINE

## 2025-04-22 PROCEDURE — 82310 ASSAY OF CALCIUM: CPT | Mod: ZL | Performed by: FAMILY MEDICINE

## 2025-04-22 PROCEDURE — 82570 ASSAY OF URINE CREATININE: CPT | Mod: ZL | Performed by: FAMILY MEDICINE

## 2025-04-22 PROCEDURE — 36415 COLL VENOUS BLD VENIPUNCTURE: CPT | Mod: ZL | Performed by: FAMILY MEDICINE

## 2025-04-22 PROCEDURE — 80061 LIPID PANEL: CPT | Mod: ZL | Performed by: FAMILY MEDICINE

## 2025-04-22 PROCEDURE — 80048 BASIC METABOLIC PNL TOTAL CA: CPT | Mod: ZL | Performed by: FAMILY MEDICINE

## 2025-04-22 ASSESSMENT — PAIN SCALES - GENERAL: PAINLEVEL_OUTOF10: NO PAIN (0)

## 2025-04-22 ASSESSMENT — ANXIETY QUESTIONNAIRES: GAD7 TOTAL SCORE: INCOMPLETE

## 2025-04-28 DIAGNOSIS — E11.22 TYPE 2 DIABETES MELLITUS WITH STAGE 1 CHRONIC KIDNEY DISEASE, WITHOUT LONG-TERM CURRENT USE OF INSULIN (H): ICD-10-CM

## 2025-04-28 DIAGNOSIS — N18.1 TYPE 2 DIABETES MELLITUS WITH STAGE 1 CHRONIC KIDNEY DISEASE, WITHOUT LONG-TERM CURRENT USE OF INSULIN (H): ICD-10-CM

## 2025-04-28 NOTE — TELEPHONE ENCOUNTER
Jardiance      Last Written Prescription Date:  4/4/25  Last Fill Quantity: 30,   # refills: 2  Last Office Visit: 4/22/25  Future Office visit:    Next 5 appointments (look out 90 days)      May 16, 2025 9:30 AM  (Arrive by 9:15 AM)  Return Visit with CANDE Schmidt CNP  [facility] ([facility] )  Arrive at:  PSYCHIATRY [address] [place] [id number]  [phone]             Routing refill request to provider for review/approval because:

## 2025-04-29 ENCOUNTER — TELEPHONE (OUTPATIENT)
Dept: FAMILY MEDICINE | Facility: OTHER | Age: 70
End: 2025-04-29

## 2025-04-29 DIAGNOSIS — Z00.00 HEALTHCARE MAINTENANCE: ICD-10-CM

## 2025-04-29 RX ORDER — EMPAGLIFLOZIN 10 MG/1
10 TABLET, FILM COATED ORAL DAILY
Qty: 30 TABLET | Refills: 2 | OUTPATIENT
Start: 2025-04-29

## 2025-04-29 NOTE — TELEPHONE ENCOUNTER
3:32 PM    Reason for Call: Phone Call    Description: Ileana, the Supervisor of [facility] calling on behalf of patient. Ileana states that due to the reults of his last lab appointment patient was instructed to discontinue taking Glipozide and increase his empagliflozin (JARDIANCE) 25 MG TABS tablet dosage. Ileana states they need documentation to show he has been taken off this medication- Glipozide.   Ileana asks to please update Pharmacy with discontinuation of Glipozide that way it won't be in system for auto-refills and then [facility] staff can get a copy of this documentation from pharmacy for their records.   If have any questions, please call number listed below, otherwise no call back needed    Was an appointment offered for this call? No  If yes : Appointment type              Date    Preferred method for responding to this message: Telephone Call  What is your phone number ? [phone]    If we cannot reach you directly, may we leave a detailed response at the number you provided? Yes    Can this message wait until your PCP/provider returns, if available today? Not applicable    Montse Gresham

## 2025-04-30 RX ORDER — CHLORHEXIDINE GLUCONATE ORAL RINSE 1.2 MG/ML
SOLUTION DENTAL
Qty: 473 ML | Refills: 3 | Status: SHIPPED | OUTPATIENT
Start: 2025-04-30

## 2025-04-30 NOTE — TELEPHONE ENCOUNTER
Call placed to [facility], Isa, update provided Dr. Nath has discontinued glipizide, please remove from auto refill.     Note below has been faxed to pharmacy to allow for [facility] to receive the documentation that glipizide has been discontinued for [facility] file as well.

## 2025-05-04 ENCOUNTER — HEALTH MAINTENANCE LETTER (OUTPATIENT)
Age: 70
End: 2025-05-04

## 2025-06-03 DIAGNOSIS — E11.9 TYPE 2 DIABETES MELLITUS WITHOUT COMPLICATION, WITHOUT LONG-TERM CURRENT USE OF INSULIN (H): ICD-10-CM

## 2025-06-03 RX ORDER — BLOOD SUGAR DIAGNOSTIC
STRIP MISCELLANEOUS
Qty: 200 STRIP | Refills: 2 | Status: SHIPPED | OUTPATIENT
Start: 2025-06-03

## 2025-06-03 NOTE — TELEPHONE ENCOUNTER
ONETOUCH ULTRA TEST test strip     Last Written Prescription Date:  5-22-24  Last Fill Quantity: 200,   # refills: 3  Last Office Visit: 4-22-25  Future Office visit:    Next 5 appointments (look out 90 days)      Jun 18, 2025 9:00 AM  (Arrive by 8:45 AM)  Return Visit with CANDE Schmidt CNP  [facility] ([facility] )  Arrive at:  PSYCHIATRY [address] [place] [id number] [phone]             Routing refill request to provider for review/approval because:  Drug not on the [facility], [facility] or  Health refill protocol or controlled substance

## 2025-06-23 ENCOUNTER — OFFICE VISIT (OUTPATIENT)
Dept: PSYCHIATRY | Facility: OTHER | Age: 70
End: 2025-06-23
Payer: COMMERCIAL

## 2025-06-23 VITALS
WEIGHT: 147 LBS | OXYGEN SATURATION: 96 % | DIASTOLIC BLOOD PRESSURE: 50 MMHG | RESPIRATION RATE: 16 BRPM | HEART RATE: 93 BPM | TEMPERATURE: 98.8 F | SYSTOLIC BLOOD PRESSURE: 104 MMHG | BODY MASS INDEX: 25.23 KG/M2

## 2025-06-23 DIAGNOSIS — F42.9 OBSESSIVE-COMPULSIVE DISORDER, UNSPECIFIED TYPE: ICD-10-CM

## 2025-06-23 DIAGNOSIS — F41.9 ANXIETY: ICD-10-CM

## 2025-06-23 DIAGNOSIS — F79 INTELLECTUAL DISABILITY: ICD-10-CM

## 2025-06-23 PROCEDURE — G0463 HOSPITAL OUTPT CLINIC VISIT: HCPCS

## 2025-06-23 RX ORDER — FLUVOXAMINE MALEATE 100 MG
100 TABLET ORAL 2 TIMES DAILY
Qty: 60 TABLET | Refills: 2 | Status: SHIPPED | OUTPATIENT
Start: 2025-06-23

## 2025-06-23 RX ORDER — ARIPIPRAZOLE 2 MG/1
2 TABLET ORAL DAILY
Qty: 30 TABLET | Refills: 2 | Status: SHIPPED | OUTPATIENT
Start: 2025-06-23

## 2025-06-23 ASSESSMENT — ABNORMAL INVOLUNTARY MOVEMENT SCALE (AIMS)
AIMS_PATIENT_AWARENESS: NO AWARENESS
TONGUE: 0

## 2025-06-23 ASSESSMENT — PAIN SCALES - GENERAL: PAINLEVEL_OUTOF10: NO PAIN (0)
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
## 2025-06-23 NOTE — PATIENT INSTRUCTIONS
"- No changes to medications  - Follow up in 3 months  Thank you for allowing CANDE Mcconnell, [facility] to participate in your care.  If you have a scheduling or an appointment question please contact our scheduling department at their direct line [phone].   ALL nursing questions or concerns can be directed to the psychiatry nurses at [phone] or [phone]    [facility]  Crisis Text Line: text or call 988  fun4jkex- Text \"Life\" to 70659  Crisis Text Line: Text  [place]  to 707962  First Call for Help: 2-1-1  Suicide LifeLine Chat: suicidepreventionlifeline.org/chat  National Suicide Prevention Lifeline: 6-979-678-TALK (4284)  National [place] on Mental Illness (www.shan.org): 327.954.4460 or 233-523-0259  SHAN Helpline- 9-671-BRBL-HELP   Mental Health Association (www.mentalhealth.org): 225.336.2165 or 274-602-5004  [place] Crisis and Referral Services: 1-533.317.6307    "

## 2025-06-27 ENCOUNTER — LAB (OUTPATIENT)
Dept: LAB | Facility: OTHER | Age: 70
End: 2025-06-27
Payer: COMMERCIAL

## 2025-06-27 ENCOUNTER — RESULTS FOLLOW-UP (OUTPATIENT)
Dept: FAMILY MEDICINE | Facility: OTHER | Age: 70
End: 2025-06-27

## 2025-06-27 DIAGNOSIS — R32 URINARY INCONTINENCE, UNSPECIFIED TYPE: ICD-10-CM

## 2025-06-27 LAB
ALBUMIN UR-MCNC: NEGATIVE MG/DL
APPEARANCE UR: CLEAR
BILIRUB UR QL STRIP: NEGATIVE
COLOR UR AUTO: YELLOW
GLUCOSE UR STRIP-MCNC: >=1000 MG/DL
HGB UR QL STRIP: NEGATIVE
KETONES UR STRIP-MCNC: NEGATIVE MG/DL
LEUKOCYTE ESTERASE UR QL STRIP: NEGATIVE
NITRATE UR QL: NEGATIVE
PH UR STRIP: 6 [PH] (ref 5–7)
SP GR UR STRIP: <=1.005 (ref 1–1.03)
UROBILINOGEN UR STRIP-ACNC: 0.2 E.U./DL

## 2025-06-27 PROCEDURE — 81003 URINALYSIS AUTO W/O SCOPE: CPT | Mod: ZL

## 2025-07-16 DIAGNOSIS — Z00.00 HEALTHCARE MAINTENANCE: ICD-10-CM

## 2025-07-16 NOTE — TELEPHONE ENCOUNTER
Disp Refills Start End JASVIR   chlorhexidine (PERIDEX) 0.12 % solution 473 mL 3 4/30/2025 -- No     Last Office Visit: 04/22/2025  Future Office visit:    Next 5 appointments (look out 90 days)      Jul 25, 2025 3:30 PM  (Arrive by 3:15 PM)  Provider Visit with Nicole Rojas MD  [facility] ([facility] ) [address]  [place] [address]  [phone]     Sep 22, 2025 9:30 AM  (Arrive by 9:15 AM)  Return Visit with CANDE Schmidt CNP  [facility] ([facility] )  Arrive at:  PSYCHIATRY [address]  [facility] [id number] [phone]     Oct 07, 2025 9:30 AM  (Arrive by 9:15 AM)  Adult Preventative Visit with Nicole Rojas MD  [facility] ([facility] ) [address]  [place] [address]  [phone]             Routing refill request to provider for review/approval because:

## 2025-07-16 NOTE — TELEPHONE ENCOUNTER
Routing refill request to provider for review/approval because:  Drug not on the [facility], [facility] or [facility] refill protocol or controlled substance

## 2025-07-17 RX ORDER — CHLORHEXIDINE GLUCONATE ORAL RINSE 1.2 MG/ML
SOLUTION DENTAL
Qty: 473 ML | Refills: 3 | Status: SHIPPED | OUTPATIENT
Start: 2025-07-17

## 2025-07-21 NOTE — PROGRESS NOTES
Assessment & Plan     1. Urinary incontinence, unspecified type (Primary)  Will check labs as requested, will also refer to Urology.  Unsure if this is behavioral, or if there is a component of medical issues (overactive bladder, BPH causing difficulty passing urine, etc).    - CBC with platelets; Future  - PSA, screen; Future  - Adult Urology  Referral; Future  - CBC with platelets  - PSA, screen    2. Screening for malignant neoplasm of prostate  - PSA, screen; Future  - PSA, screen    3. Type 2 diabetes mellitus without complication, without long-term current use of insulin (H)  Will add Januvia to help lower blood glucose levels as previously mentioned in fax received from residence.  - sitagliptin (JANUVIA) 50 MG tablet; Take 1 tablet (50 mg) by mouth daily.  Dispense: 30 tablet; Refill: 5      The longitudinal plan of care for the diagnosis(es)/condition(s) as documented were addressed during this visit. Due to the added complexity in care, I will continue to support Aki in the subsequent management and with ongoing continuity of care.    Follow-up  Return in about 3 months (around 10/25/2025) for Diabetic Follow-up, Chronic Disease Management, Medication review.    Subjective   Aki is a 69 year old, presenting for the following health issues:  Urinary Problem        7/25/2025     3:26 PM   Additional Questions   Roomed by bebo   Accompanied by staff         7/25/2025   Forms   Any forms needing to be completed Yes     HPI      Concern - urinary incontinence   Onset: couple months   Description:   Having accidents and dribbling   Intensity: mild  Progression of Symptoms:  same  Accompanying Signs & Symptoms:  none  Previous history of similar problem:   No   Precipitating factors:   Worsened by: unknown   Alleviating factors:  Improved by: nothing     Therapies Tried and outcome: goes to bathroom on own not sure if just does not get there on time      I had also received a fax a couple of  "weeks ago regarding patient's blood glucose levels being elevated.        Review of Systems  Constitutional, HEENT, cardiovascular, pulmonary, gi and gu systems are negative, except as otherwise noted.      Objective    /58 (BP Location: Left arm, Patient Position: Chair, Cuff Size: Adult Regular)   Pulse 88   Temp 97.4  F (36.3  C) (Tympanic)   Resp 18   Ht 1.626 m (5' 4\")   Wt 63.4 kg (139 lb 12.8 oz)   SpO2 97%   BMI 24.00 kg/m    Body mass index is 24 kg/m .  Physical Exam   GENERAL: alert and no distress  PSYCH: affect normal/bright          Signed Electronically by: iNcole Rojas MD    "

## 2025-07-22 DIAGNOSIS — Z00.00 HEALTHCARE MAINTENANCE: ICD-10-CM

## 2025-07-22 RX ORDER — MULTIVITAMIN/IRON/FOLIC ACID 18MG-0.4MG
1 TABLET ORAL DAILY
Qty: 100 TABLET | Refills: 3 | Status: SHIPPED | OUTPATIENT
Start: 2025-07-22

## 2025-07-22 NOTE — TELEPHONE ENCOUNTER
Certavite-Antioxidant 18 mg-400 mcg tablet       Last Written Prescription Date:  9/19/24  Last Fill Quantity: 100,   # refills: 3  Last Office Visit: 4/22/25  Future Office visit:    Next 5 appointments (look out 90 days)      Jul 25, 2025 3:30 PM  (Arrive by 3:15 PM)  Provider Visit with Nicole Rojas MD  [facility] ([facility] ) [address]  [place] [address]  [phone]     Sep 22, 2025 9:30 AM  (Arrive by 9:15 AM)  Return Visit with CANDE Schmidt CNP  [facility] ([facility] )  Arrive at:  PSYCHIATRY [address] [place] [id number] [phone]     Oct 07, 2025 9:30 AM  (Arrive by 9:15 AM)  Adult Preventative Visit with Nicole Rojas MD  [facility] ([facility] ) [address]  [place] [address]  [phone]             Routing refill request to provider for review/approval because:  Drug not on the G, P or  Health refill protocol or controlled substance

## 2025-07-24 DIAGNOSIS — E78.2 MIXED HYPERLIPIDEMIA: ICD-10-CM

## 2025-07-24 DIAGNOSIS — K21.9 GASTROESOPHAGEAL REFLUX DISEASE, UNSPECIFIED WHETHER ESOPHAGITIS PRESENT: ICD-10-CM

## 2025-07-24 RX ORDER — FAMOTIDINE 40 MG/1
40 TABLET, FILM COATED ORAL DAILY
Qty: 90 TABLET | Refills: 3 | Status: SHIPPED | OUTPATIENT
Start: 2025-07-24

## 2025-07-24 RX ORDER — SIMVASTATIN 10 MG
10 TABLET ORAL DAILY
Qty: 90 TABLET | Refills: 3 | Status: SHIPPED | OUTPATIENT
Start: 2025-07-24

## 2025-07-25 ENCOUNTER — OFFICE VISIT (OUTPATIENT)
Dept: FAMILY MEDICINE | Facility: OTHER | Age: 70
End: 2025-07-25
Attending: FAMILY MEDICINE
Payer: COMMERCIAL

## 2025-07-25 VITALS
HEIGHT: 64 IN | DIASTOLIC BLOOD PRESSURE: 58 MMHG | OXYGEN SATURATION: 97 % | TEMPERATURE: 97.4 F | RESPIRATION RATE: 18 BRPM | HEART RATE: 88 BPM | WEIGHT: 139.8 LBS | BODY MASS INDEX: 23.87 KG/M2 | SYSTOLIC BLOOD PRESSURE: 108 MMHG

## 2025-07-25 DIAGNOSIS — R32 URINARY INCONTINENCE, UNSPECIFIED TYPE: Primary | ICD-10-CM

## 2025-07-25 DIAGNOSIS — E11.9 TYPE 2 DIABETES MELLITUS WITHOUT COMPLICATION, WITHOUT LONG-TERM CURRENT USE OF INSULIN (H): ICD-10-CM

## 2025-07-25 DIAGNOSIS — Z12.5 SCREENING FOR MALIGNANT NEOPLASM OF PROSTATE: ICD-10-CM

## 2025-07-25 LAB
ERYTHROCYTE [DISTWIDTH] IN BLOOD BY AUTOMATED COUNT: 13 % (ref 10–15)
HCT VFR BLD AUTO: 38.2 % (ref 40–53)
HGB BLD-MCNC: 12.8 G/DL (ref 13.3–17.7)
MCH RBC QN AUTO: 30 PG (ref 26.5–33)
MCHC RBC AUTO-ENTMCNC: 33.5 G/DL (ref 31.5–36.5)
MCV RBC AUTO: 90 FL (ref 78–100)
PLATELET # BLD AUTO: 379 10E3/UL (ref 150–450)
PSA SERPL DL<=0.01 NG/ML-MCNC: 1.83 NG/ML (ref 0–4.5)
RBC # BLD AUTO: 4.27 10E6/UL (ref 4.4–5.9)
WBC # BLD AUTO: 7.1 10E3/UL (ref 4–11)

## 2025-07-25 PROCEDURE — 85041 AUTOMATED RBC COUNT: CPT | Mod: ZL | Performed by: FAMILY MEDICINE

## 2025-07-25 PROCEDURE — G0463 HOSPITAL OUTPT CLINIC VISIT: HCPCS

## 2025-07-25 PROCEDURE — 36415 COLL VENOUS BLD VENIPUNCTURE: CPT | Mod: ZL | Performed by: FAMILY MEDICINE

## 2025-07-25 PROCEDURE — G0103 PSA SCREENING: HCPCS | Mod: ZL | Performed by: FAMILY MEDICINE

## 2025-09-03 DIAGNOSIS — M19.90 ARTHRITIS: ICD-10-CM

## 2025-09-03 RX ORDER — CELECOXIB 200 MG/1
200 CAPSULE ORAL DAILY
Qty: 30 CAPSULE | Refills: 5 | Status: SHIPPED | OUTPATIENT
Start: 2025-09-03 | End: 2025-09-04

## 2025-09-04 RX ORDER — CELECOXIB 200 MG/1
200 CAPSULE ORAL DAILY
Qty: 30 CAPSULE | Refills: 5 | Status: SHIPPED | OUTPATIENT
Start: 2025-09-04